# Patient Record
Sex: FEMALE | Race: BLACK OR AFRICAN AMERICAN | Employment: OTHER | URBAN - METROPOLITAN AREA
[De-identification: names, ages, dates, MRNs, and addresses within clinical notes are randomized per-mention and may not be internally consistent; named-entity substitution may affect disease eponyms.]

---

## 2017-03-18 ENCOUNTER — HOSPITAL ENCOUNTER (OUTPATIENT)
Dept: WOMENS IMAGING | Age: 72
Discharge: HOME | End: 2017-03-18
Payer: MEDICARE

## 2017-03-18 DIAGNOSIS — Z13.9 SCREENING: ICD-10-CM

## 2017-03-18 PROCEDURE — G0202 SCR MAMMO BI INCL CAD: HCPCS

## 2019-03-28 ENCOUNTER — HOSPITAL ENCOUNTER (OUTPATIENT)
Dept: WOMENS IMAGING | Age: 74
Discharge: HOME | End: 2019-03-30
Payer: MEDICARE

## 2019-03-28 DIAGNOSIS — Z12.31 ENCOUNTER FOR SCREENING MAMMOGRAM FOR BREAST CANCER: ICD-10-CM

## 2019-03-28 PROCEDURE — 77063 BREAST TOMOSYNTHESIS BI: CPT

## 2019-09-26 PROBLEM — D63.1 ERYTHROPOIETIN DEFICIENCY ANEMIA: Status: ACTIVE | Noted: 2019-09-26

## 2019-09-27 ENCOUNTER — HOSPITAL ENCOUNTER (OUTPATIENT)
Dept: INFUSION THERAPY | Age: 74
Setting detail: INFUSION SERIES
Discharge: HOME | End: 2019-09-27
Payer: MEDICARE

## 2019-09-27 DIAGNOSIS — D63.1 ERYTHROPOIETIN DEFICIENCY ANEMIA: Primary | ICD-10-CM

## 2019-09-27 NOTE — PROGRESS NOTES
Pt never showed up for appt. Called pt. Pt  Stated was almost home. Infusion would be a two hour procedure, so cannot do today. Pt Had gone to [facility] for appt. Stated was confused about appt. Pt will keep next appt for next week and the week after. Will need to add week. Johnny Moody, stated someone from [facility] called earlier and asked if pt can still come for infusion, and Tony Mercado said yes. Pt was checked in at that time and med was ordered. Evidently the person did not say which facility she was calling from.

## 2019-10-11 ENCOUNTER — HOSPITAL ENCOUNTER (OUTPATIENT)
Dept: INFUSION THERAPY | Age: 74
Setting detail: INFUSION SERIES
Discharge: HOME | End: 2019-10-11
Payer: MEDICARE

## 2019-10-11 VITALS — RESPIRATION RATE: 18 BRPM | SYSTOLIC BLOOD PRESSURE: 163 MMHG | DIASTOLIC BLOOD PRESSURE: 67 MMHG | TEMPERATURE: 97.2 F

## 2019-10-11 DIAGNOSIS — D63.1 ERYTHROPOIETIN DEFICIENCY ANEMIA: Primary | ICD-10-CM

## 2019-10-11 PROCEDURE — 2580000003 HC RX 258: Performed by: INTERNAL MEDICINE

## 2019-10-11 PROCEDURE — 6360000002 HC RX W HCPCS: Performed by: INTERNAL MEDICINE

## 2019-10-11 PROCEDURE — 96365 THER/PROPH/DIAG IV INF INIT: CPT

## 2019-10-11 RX ADMIN — IRON SUCROSE 200 MG: 20 INJECTION, SOLUTION INTRAVENOUS at 14:36

## 2019-10-11 NOTE — PROGRESS NOTES
No sxs reactions. Iv d/c'd and pt left unit. No c/o or requests. Will need to be scheduled for 10/25 to make up for the missed dose last week.

## 2019-10-11 NOTE — PROGRESS NOTES
When pt arrived, was made aware of one hour observation after infusion. lexicomp info given. Pt tolerated infusion. No sxs reactions. One hour begins.

## 2019-10-18 ENCOUNTER — HOSPITAL ENCOUNTER (OUTPATIENT)
Dept: INFUSION THERAPY | Age: 74
Setting detail: INFUSION SERIES
Discharge: HOME | End: 2019-10-18
Payer: MEDICARE

## 2019-10-18 VITALS
TEMPERATURE: 98.5 F | DIASTOLIC BLOOD PRESSURE: 63 MMHG | SYSTOLIC BLOOD PRESSURE: 141 MMHG | HEART RATE: 63 BPM | RESPIRATION RATE: 18 BRPM

## 2019-10-18 DIAGNOSIS — D63.1 ERYTHROPOIETIN DEFICIENCY ANEMIA: Primary | ICD-10-CM

## 2019-10-18 PROCEDURE — 2580000003 HC RX 258: Performed by: INTERNAL MEDICINE

## 2019-10-18 PROCEDURE — 96365 THER/PROPH/DIAG IV INF INIT: CPT

## 2019-10-18 PROCEDURE — 6360000002 HC RX W HCPCS: Performed by: INTERNAL MEDICINE

## 2019-10-18 RX ADMIN — IRON SUCROSE 200 MG: 20 INJECTION, SOLUTION INTRAVENOUS at 13:33

## 2019-10-25 ENCOUNTER — HOSPITAL ENCOUNTER (OUTPATIENT)
Dept: INFUSION THERAPY | Age: 74
Setting detail: INFUSION SERIES
Discharge: HOME | End: 2019-10-25
Payer: MEDICARE

## 2019-10-25 VITALS
RESPIRATION RATE: 18 BRPM | TEMPERATURE: 98 F | HEART RATE: 66 BPM | DIASTOLIC BLOOD PRESSURE: 67 MMHG | SYSTOLIC BLOOD PRESSURE: 160 MMHG

## 2019-10-25 DIAGNOSIS — D63.1 ERYTHROPOIETIN DEFICIENCY ANEMIA: Primary | ICD-10-CM

## 2019-10-25 PROCEDURE — 2580000003 HC RX 258: Performed by: INTERNAL MEDICINE

## 2019-10-25 PROCEDURE — 96365 THER/PROPH/DIAG IV INF INIT: CPT

## 2019-10-25 PROCEDURE — 6360000002 HC RX W HCPCS: Performed by: INTERNAL MEDICINE

## 2019-10-25 RX ADMIN — IRON SUCROSE 200 MG: 20 INJECTION, SOLUTION INTRAVENOUS at 11:10

## 2019-10-25 NOTE — PROGRESS NOTES
AVS given, also lexicomp on med since couldn't find last copy. Pt left unit ambulatory, no c/o or requests. All equipment used in the care for this patient has been cleaned.

## 2019-12-18 PROBLEM — N18.30 CHRONIC KIDNEY DISEASE, STAGE III (MODERATE) (HCC): Status: ACTIVE | Noted: 2019-12-18

## 2019-12-19 ENCOUNTER — HOSPITAL ENCOUNTER (OUTPATIENT)
Dept: INFUSION THERAPY | Age: 74
Setting detail: INFUSION SERIES
Discharge: HOME | End: 2019-12-19
Payer: MEDICARE

## 2019-12-19 ENCOUNTER — HOSPITAL ENCOUNTER (EMERGENCY)
Age: 74
Discharge: HOME | End: 2019-12-19
Payer: MEDICARE

## 2019-12-19 VITALS
RESPIRATION RATE: 18 BRPM | TEMPERATURE: 98.4 F | BODY MASS INDEX: 22.32 KG/M2 | DIASTOLIC BLOOD PRESSURE: 81 MMHG | SYSTOLIC BLOOD PRESSURE: 147 MMHG | HEIGHT: 63 IN | WEIGHT: 126 LBS | OXYGEN SATURATION: 100 % | HEART RATE: 69 BPM

## 2019-12-19 VITALS
BODY MASS INDEX: 22.32 KG/M2 | TEMPERATURE: 98.1 F | WEIGHT: 126 LBS | SYSTOLIC BLOOD PRESSURE: 206 MMHG | HEART RATE: 66 BPM | RESPIRATION RATE: 18 BRPM | DIASTOLIC BLOOD PRESSURE: 80 MMHG

## 2019-12-19 DIAGNOSIS — I10 ESSENTIAL HYPERTENSION: Primary | ICD-10-CM

## 2019-12-19 DIAGNOSIS — N18.30 CHRONIC KIDNEY DISEASE, STAGE III (MODERATE) (HCC): Primary | ICD-10-CM

## 2019-12-19 DIAGNOSIS — D63.1 ERYTHROPOIETIN DEFICIENCY ANEMIA: ICD-10-CM

## 2019-12-19 LAB
ALBUMIN SERPL-MCNC: 3.4 G/DL (ref 3.5–4.6)
ALP BLD-CCNC: 159 U/L (ref 40–130)
ALT SERPL-CCNC: 16 U/L (ref 0–33)
ANION GAP SERPL CALCULATED.3IONS-SCNC: 11 MEQ/L (ref 9–15)
ANISOCYTOSIS: ABNORMAL
AST SERPL-CCNC: 22 U/L (ref 0–35)
ATYPICAL LYMPHOCYTE RELATIVE PERCENT: 1 %
BASOPHILS ABSOLUTE: 0.1 K/UL (ref 0–0.2)
BASOPHILS RELATIVE PERCENT: 1 %
BILIRUB SERPL-MCNC: <0.2 MG/DL (ref 0.2–0.7)
BUN BLDV-MCNC: 24 MG/DL (ref 8–23)
CALCIUM SERPL-MCNC: 9.5 MG/DL (ref 8.5–9.9)
CHLORIDE BLD-SCNC: 98 MEQ/L (ref 95–107)
CO2: 25 MEQ/L (ref 20–31)
CREAT SERPL-MCNC: 1.46 MG/DL (ref 0.5–0.9)
EKG ATRIAL RATE: 66 BPM
EKG P AXIS: 64 DEGREES
EKG P-R INTERVAL: 152 MS
EKG Q-T INTERVAL: 430 MS
EKG QRS DURATION: 126 MS
EKG QTC CALCULATION (BAZETT): 450 MS
EKG R AXIS: -46 DEGREES
EKG T AXIS: 39 DEGREES
EKG VENTRICULAR RATE: 66 BPM
EOSINOPHILS ABSOLUTE: 0.4 K/UL (ref 0–0.7)
EOSINOPHILS RELATIVE PERCENT: 4 %
GFR AFRICAN AMERICAN: 42.6
GFR NON-AFRICAN AMERICAN: 35.2
GLOBULIN: 4.5 G/DL (ref 2.3–3.5)
GLUCOSE BLD-MCNC: 155 MG/DL (ref 70–99)
HCT VFR BLD CALC: 28.9 % (ref 37–47)
HCT VFR BLD CALC: 29.9 % (ref 37–47)
HEMOGLOBIN: 9.4 G/DL (ref 12–16)
HEMOGLOBIN: 9.4 G/DL (ref 12–16)
HYPOCHROMIA: ABNORMAL
LYMPHOCYTES ABSOLUTE: 3.6 K/UL (ref 1–4.8)
LYMPHOCYTES RELATIVE PERCENT: 40 %
MCH RBC QN AUTO: 21.7 PG (ref 27–31.3)
MCH RBC QN AUTO: 22 PG (ref 27–31.3)
MCHC RBC AUTO-ENTMCNC: 31.4 % (ref 33–37)
MCHC RBC AUTO-ENTMCNC: 32.4 % (ref 33–37)
MCV RBC AUTO: 67.8 FL (ref 82–100)
MCV RBC AUTO: 69 FL (ref 82–100)
MICROCYTES: ABNORMAL
MONOCYTES ABSOLUTE: 0.2 K/UL (ref 0.2–0.8)
MONOCYTES RELATIVE PERCENT: 1.9 %
NEUTROPHILS ABSOLUTE: 4.7 K/UL (ref 1.4–6.5)
NEUTROPHILS RELATIVE PERCENT: 53 %
PDW BLD-RTO: 17.6 % (ref 11.5–14.5)
PDW BLD-RTO: 17.9 % (ref 11.5–14.5)
PLATELET # BLD: 181 K/UL (ref 130–400)
PLATELET # BLD: 199 K/UL (ref 130–400)
POTASSIUM SERPL-SCNC: 3.8 MEQ/L (ref 3.4–4.9)
RBC # BLD: 4.26 M/UL (ref 4.2–5.4)
RBC # BLD: 4.33 M/UL (ref 4.2–5.4)
SODIUM BLD-SCNC: 134 MEQ/L (ref 135–144)
TARGET CELLS: ABNORMAL
TOTAL PROTEIN: 7.9 G/DL (ref 6.3–8)
WBC # BLD: 8.2 K/UL (ref 4.8–10.8)
WBC # BLD: 8.8 K/UL (ref 4.8–10.8)

## 2019-12-19 PROCEDURE — 96372 THER/PROPH/DIAG INJ SC/IM: CPT

## 2019-12-19 PROCEDURE — 85025 COMPLETE CBC W/AUTO DIFF WBC: CPT

## 2019-12-19 PROCEDURE — 36415 COLL VENOUS BLD VENIPUNCTURE: CPT

## 2019-12-19 PROCEDURE — 6360000002 HC RX W HCPCS: Performed by: PERSONAL EMERGENCY RESPONSE ATTENDANT

## 2019-12-19 PROCEDURE — 93005 ELECTROCARDIOGRAM TRACING: CPT | Performed by: EMERGENCY MEDICINE

## 2019-12-19 PROCEDURE — 6360000002 HC RX W HCPCS: Performed by: INTERNAL MEDICINE

## 2019-12-19 PROCEDURE — 80053 COMPREHEN METABOLIC PANEL: CPT

## 2019-12-19 PROCEDURE — 99283 EMERGENCY DEPT VISIT LOW MDM: CPT

## 2019-12-19 PROCEDURE — 85027 COMPLETE CBC AUTOMATED: CPT

## 2019-12-19 PROCEDURE — 96376 TX/PRO/DX INJ SAME DRUG ADON: CPT

## 2019-12-19 PROCEDURE — 96374 THER/PROPH/DIAG INJ IV PUSH: CPT

## 2019-12-19 RX ORDER — HYDRALAZINE HYDROCHLORIDE 20 MG/ML
10 INJECTION INTRAMUSCULAR; INTRAVENOUS ONCE
Status: COMPLETED | OUTPATIENT
Start: 2019-12-19 | End: 2019-12-19

## 2019-12-19 RX ADMIN — HYDRALAZINE HYDROCHLORIDE 10 MG: 20 INJECTION INTRAMUSCULAR; INTRAVENOUS at 19:31

## 2019-12-19 RX ADMIN — HYDRALAZINE HYDROCHLORIDE 10 MG: 20 INJECTION INTRAMUSCULAR; INTRAVENOUS at 18:57

## 2019-12-19 RX ADMIN — EPOETIN ALFA-EPBX 10000 UNITS: 10000 INJECTION, SOLUTION INTRAVENOUS; SUBCUTANEOUS at 13:23

## 2019-12-19 ASSESSMENT — ENCOUNTER SYMPTOMS
RHINORRHEA: 0
SORE THROAT: 0
NAUSEA: 0
SHORTNESS OF BREATH: 0
COUGH: 0
VOMITING: 0
BLOOD IN STOOL: 0
DIARRHEA: 0
COLOR CHANGE: 0
ABDOMINAL PAIN: 0

## 2019-12-19 NOTE — PROGRESS NOTES
Pt to [facility] ambulatory, with spouse, for lab and possible retacrit injection. Pt states no c/o. Spouse requests info on med.

## 2019-12-19 NOTE — PROGRESS NOTES
Lexicomp info was given. Injection admin after results of lab, Hgb 9.4 . Number increased from 8.6 in October. Pt tolerated well.

## 2019-12-19 NOTE — PROGRESS NOTES
Rechecked BP d/t high,   Now 222/96. Pt is anxious about injection. Will recheck BP in 10 minutes.   pt states didn't take BOP med this am.

## 2019-12-20 PROCEDURE — 93010 ELECTROCARDIOGRAM REPORT: CPT | Performed by: INTERNAL MEDICINE

## 2020-01-02 ENCOUNTER — HOSPITAL ENCOUNTER (OUTPATIENT)
Dept: INFUSION THERAPY | Age: 75
Setting detail: INFUSION SERIES
Discharge: HOME | End: 2020-01-02
Payer: MEDICARE

## 2020-01-02 VITALS
DIASTOLIC BLOOD PRESSURE: 77 MMHG | RESPIRATION RATE: 18 BRPM | SYSTOLIC BLOOD PRESSURE: 169 MMHG | HEART RATE: 76 BPM | TEMPERATURE: 98.2 F

## 2020-01-02 DIAGNOSIS — N18.30 CHRONIC KIDNEY DISEASE, STAGE III (MODERATE) (HCC): Primary | ICD-10-CM

## 2020-01-02 DIAGNOSIS — D63.1 ERYTHROPOIETIN DEFICIENCY ANEMIA: ICD-10-CM

## 2020-01-02 LAB
HCT VFR BLD CALC: 29.2 % (ref 37–47)
HEMOGLOBIN: 9.1 G/DL (ref 12–16)
MCH RBC QN AUTO: 21.7 PG (ref 27–31.3)
MCHC RBC AUTO-ENTMCNC: 31.2 % (ref 33–37)
MCV RBC AUTO: 69.5 FL (ref 82–100)
PDW BLD-RTO: 17.4 % (ref 11.5–14.5)
PLATELET # BLD: 186 K/UL (ref 130–400)
RBC # BLD: 4.2 M/UL (ref 4.2–5.4)
WBC # BLD: 6.6 K/UL (ref 4.8–10.8)

## 2020-01-02 PROCEDURE — 96372 THER/PROPH/DIAG INJ SC/IM: CPT

## 2020-01-02 PROCEDURE — 85027 COMPLETE CBC AUTOMATED: CPT

## 2020-01-02 PROCEDURE — 36415 COLL VENOUS BLD VENIPUNCTURE: CPT

## 2020-01-02 PROCEDURE — 6360000002 HC RX W HCPCS: Performed by: INTERNAL MEDICINE

## 2020-01-02 RX ADMIN — EPOETIN ALFA-EPBX 10000 UNITS: 10000 INJECTION, SOLUTION INTRAVENOUS; SUBCUTANEOUS at 13:17

## 2020-01-02 NOTE — PROGRESS NOTES
Injection administered. Pt tolerated well. No c/o or requests. Card given to call and make next 4 appts.

## 2020-01-16 ENCOUNTER — HOSPITAL ENCOUNTER (OUTPATIENT)
Dept: INFUSION THERAPY | Age: 75
Setting detail: INFUSION SERIES
Discharge: HOME | End: 2020-01-16
Payer: MEDICARE

## 2020-01-16 VITALS
TEMPERATURE: 98.3 F | RESPIRATION RATE: 18 BRPM | SYSTOLIC BLOOD PRESSURE: 128 MMHG | DIASTOLIC BLOOD PRESSURE: 59 MMHG | HEART RATE: 79 BPM

## 2020-01-16 DIAGNOSIS — D63.1 ERYTHROPOIETIN DEFICIENCY ANEMIA: ICD-10-CM

## 2020-01-16 DIAGNOSIS — N18.30 CHRONIC KIDNEY DISEASE, STAGE III (MODERATE) (HCC): Primary | ICD-10-CM

## 2020-01-16 PROCEDURE — 6360000002 HC RX W HCPCS: Performed by: INTERNAL MEDICINE

## 2020-01-16 PROCEDURE — 96372 THER/PROPH/DIAG INJ SC/IM: CPT

## 2020-01-16 RX ADMIN — EPOETIN ALFA-EPBX 10000 UNITS: 10000 INJECTION, SOLUTION INTRAVENOUS; SUBCUTANEOUS at 12:20

## 2020-01-16 NOTE — PROGRESS NOTES
1220. Pt given Retacrit 10,000 units subcu left upper outer arm. Emma well. HG 9.1 01/02/2020. Went over pt's upcoming appts. No requests or questions. All equipment used in the care for this patient has been cleaned.

## 2020-02-13 ENCOUNTER — HOSPITAL ENCOUNTER (OUTPATIENT)
Dept: INFUSION THERAPY | Age: 75
Setting detail: INFUSION SERIES
Discharge: HOME | End: 2020-02-13
Payer: MEDICARE

## 2020-02-13 VITALS
RESPIRATION RATE: 18 BRPM | DIASTOLIC BLOOD PRESSURE: 72 MMHG | HEART RATE: 73 BPM | SYSTOLIC BLOOD PRESSURE: 163 MMHG | TEMPERATURE: 97.8 F

## 2020-02-13 DIAGNOSIS — N18.30 CHRONIC KIDNEY DISEASE, STAGE III (MODERATE) (HCC): Primary | ICD-10-CM

## 2020-02-13 DIAGNOSIS — D63.1 ERYTHROPOIETIN DEFICIENCY ANEMIA: ICD-10-CM

## 2020-02-13 LAB
HCT VFR BLD CALC: 32.8 % (ref 37–47)
HEMOGLOBIN: 10.4 G/DL (ref 12–16)
MCH RBC QN AUTO: 21.8 PG (ref 27–31.3)
MCHC RBC AUTO-ENTMCNC: 31.7 % (ref 33–37)
MCV RBC AUTO: 68.7 FL (ref 82–100)
PDW BLD-RTO: 16.8 % (ref 11.5–14.5)
PLATELET # BLD: 225 K/UL (ref 130–400)
RBC # BLD: 4.77 M/UL (ref 4.2–5.4)
WBC # BLD: 7.9 K/UL (ref 4.8–10.8)

## 2020-02-13 PROCEDURE — 36415 COLL VENOUS BLD VENIPUNCTURE: CPT

## 2020-02-13 PROCEDURE — 85027 COMPLETE CBC AUTOMATED: CPT

## 2020-02-13 NOTE — PROGRESS NOTES
Lab showed Hgb 10.4. No injection needed. Instructed pt to make appt for one month to return. avs given.

## 2020-03-13 ENCOUNTER — HOSPITAL ENCOUNTER (OUTPATIENT)
Dept: INFUSION THERAPY | Age: 75
Setting detail: INFUSION SERIES
Discharge: HOME | End: 2020-03-13
Payer: MEDICARE

## 2020-03-13 VITALS
SYSTOLIC BLOOD PRESSURE: 197 MMHG | HEART RATE: 67 BPM | RESPIRATION RATE: 18 BRPM | DIASTOLIC BLOOD PRESSURE: 89 MMHG | TEMPERATURE: 97.8 F

## 2020-03-13 DIAGNOSIS — D63.1 ERYTHROPOIETIN DEFICIENCY ANEMIA: ICD-10-CM

## 2020-03-13 DIAGNOSIS — N18.30 CHRONIC KIDNEY DISEASE, STAGE III (MODERATE) (HCC): Primary | ICD-10-CM

## 2020-03-13 LAB
HCT VFR BLD CALC: 29.7 % (ref 37–47)
HEMOGLOBIN: 9.3 G/DL (ref 12–16)
MCH RBC QN AUTO: 21.4 PG (ref 27–31.3)
MCHC RBC AUTO-ENTMCNC: 31.4 % (ref 33–37)
MCV RBC AUTO: 68.1 FL (ref 82–100)
PDW BLD-RTO: 17.1 % (ref 11.5–14.5)
PLATELET # BLD: 173 K/UL (ref 130–400)
RBC # BLD: 4.36 M/UL (ref 4.2–5.4)
WBC # BLD: 6.6 K/UL (ref 4.8–10.8)

## 2020-03-13 PROCEDURE — 6360000002 HC RX W HCPCS: Performed by: INTERNAL MEDICINE

## 2020-03-13 PROCEDURE — 36415 COLL VENOUS BLD VENIPUNCTURE: CPT

## 2020-03-13 PROCEDURE — 96372 THER/PROPH/DIAG INJ SC/IM: CPT

## 2020-03-13 PROCEDURE — 85027 COMPLETE CBC AUTOMATED: CPT

## 2020-03-13 RX ADMIN — EPOETIN ALFA-EPBX 10000 UNITS: 10000 INJECTION, SOLUTION INTRAVENOUS; SUBCUTANEOUS at 09:24

## 2020-03-13 NOTE — PROGRESS NOTES
Pt to [facility] ambulatory, with spouse for lab draw and possible retacrit. Pt states no c/o. Lab drawn and sent.

## 2020-03-13 NOTE — PROGRESS NOTES
Hemoglobin 9.3. Retacrit admin. Pt tolerated well. No c/o or requests.   Left unit after receiving AVS.

## 2020-04-22 ENCOUNTER — HOSPITAL ENCOUNTER (OUTPATIENT)
Dept: INFUSION THERAPY | Age: 75
Setting detail: INFUSION SERIES
Discharge: HOME | End: 2020-04-22
Payer: MEDICARE

## 2020-04-22 VITALS
HEART RATE: 84 BPM | DIASTOLIC BLOOD PRESSURE: 81 MMHG | TEMPERATURE: 98.5 F | SYSTOLIC BLOOD PRESSURE: 184 MMHG | RESPIRATION RATE: 18 BRPM

## 2020-04-22 DIAGNOSIS — D63.1 ERYTHROPOIETIN DEFICIENCY ANEMIA: ICD-10-CM

## 2020-04-22 DIAGNOSIS — N18.30 CHRONIC KIDNEY DISEASE, STAGE III (MODERATE) (HCC): Primary | ICD-10-CM

## 2020-04-22 LAB
HCT VFR BLD CALC: 24.5 % (ref 37–47)
HEMOGLOBIN: 7.7 G/DL (ref 12–16)
MCH RBC QN AUTO: 21.3 PG (ref 27–31.3)
MCHC RBC AUTO-ENTMCNC: 31.2 % (ref 33–37)
MCV RBC AUTO: 68.2 FL (ref 82–100)
PDW BLD-RTO: 18.9 % (ref 11.5–14.5)
PLATELET # BLD: 142 K/UL (ref 130–400)
RBC # BLD: 3.59 M/UL (ref 4.2–5.4)
WBC # BLD: 4.7 K/UL (ref 4.8–10.8)

## 2020-04-22 PROCEDURE — 96372 THER/PROPH/DIAG INJ SC/IM: CPT

## 2020-04-22 PROCEDURE — 6360000002 HC RX W HCPCS: Performed by: INTERNAL MEDICINE

## 2020-04-22 PROCEDURE — 36415 COLL VENOUS BLD VENIPUNCTURE: CPT

## 2020-04-22 PROCEDURE — 85027 COMPLETE CBC AUTOMATED: CPT

## 2020-04-22 RX ADMIN — EPOETIN ALFA-EPBX 10000 UNITS: 10000 INJECTION, SOLUTION INTRAVENOUS; SUBCUTANEOUS at 15:59

## 2020-04-22 NOTE — FLOWSHEET NOTE
Patient to the floor ambulatory for labs and her injection. Vital signs taken. Blood pressure is elevated. She states that her DrLilian Increased her blood pressure medication. Labs obtained and sent. Call light within reach. Family at side.

## 2020-04-22 NOTE — FLOWSHEET NOTE
HGB 7.7. Injection given in left arm. Tolerated well. Lab results faxed to Dr. Michele Harp office. Declined an AVS. Left the unit ambulatory. All equipment used in the care for this patient has been cleaned.

## 2020-09-24 ENCOUNTER — HOSPITAL ENCOUNTER (OUTPATIENT)
Dept: INFUSION THERAPY | Age: 75
Setting detail: INFUSION SERIES
Discharge: HOME | End: 2020-09-24
Payer: MEDICARE

## 2020-09-24 VITALS
HEART RATE: 84 BPM | DIASTOLIC BLOOD PRESSURE: 69 MMHG | RESPIRATION RATE: 16 BRPM | SYSTOLIC BLOOD PRESSURE: 153 MMHG | TEMPERATURE: 98.4 F

## 2020-09-24 DIAGNOSIS — N18.30 CHRONIC KIDNEY DISEASE, STAGE III (MODERATE) (HCC): Primary | ICD-10-CM

## 2020-09-24 DIAGNOSIS — D63.1 ERYTHROPOIETIN DEFICIENCY ANEMIA: ICD-10-CM

## 2020-09-24 LAB
ANISOCYTOSIS: ABNORMAL
BANDED NEUTROPHILS RELATIVE PERCENT: 1 % (ref 5–11)
BASOPHILS ABSOLUTE: 0.1 K/UL (ref 0–0.2)
BASOPHILS RELATIVE PERCENT: 1 %
EOSINOPHILS ABSOLUTE: 0 K/UL (ref 0–0.7)
EOSINOPHILS RELATIVE PERCENT: 0.8 %
HCT VFR BLD CALC: 23 % (ref 37–47)
HEMOGLOBIN: 7.3 G/DL (ref 12–16)
HYPOCHROMIA: ABNORMAL
LYMPHOCYTES ABSOLUTE: 0.5 K/UL (ref 1–4.8)
LYMPHOCYTES RELATIVE PERCENT: 8 %
MACROCYTES: ABNORMAL
MCH RBC QN AUTO: 21.3 PG (ref 27–31.3)
MCHC RBC AUTO-ENTMCNC: 31.9 % (ref 33–37)
MCV RBC AUTO: 66.7 FL (ref 82–100)
MONOCYTES ABSOLUTE: 0.4 K/UL (ref 0.2–0.8)
MONOCYTES RELATIVE PERCENT: 6.9 %
NEUTROPHILS ABSOLUTE: 5.2 K/UL (ref 1.4–6.5)
NEUTROPHILS RELATIVE PERCENT: 83 %
PDW BLD-RTO: 20.2 % (ref 11.5–14.5)
PLATELET # BLD: 179 K/UL (ref 130–400)
PLATELET SLIDE REVIEW: NORMAL
RBC # BLD: 3.45 M/UL (ref 4.2–5.4)
SLIDE REVIEW: ABNORMAL
TARGET CELLS: ABNORMAL
WBC # BLD: 6.2 K/UL (ref 4.8–10.8)

## 2020-09-24 PROCEDURE — 96365 THER/PROPH/DIAG IV INF INIT: CPT

## 2020-09-24 PROCEDURE — 85025 COMPLETE CBC W/AUTO DIFF WBC: CPT

## 2020-09-24 PROCEDURE — 6360000002 HC RX W HCPCS: Performed by: INTERNAL MEDICINE

## 2020-09-24 PROCEDURE — 2580000003 HC RX 258: Performed by: INTERNAL MEDICINE

## 2020-09-24 PROCEDURE — 36415 COLL VENOUS BLD VENIPUNCTURE: CPT

## 2020-09-24 RX ADMIN — SODIUM CHLORIDE 200 MG: 9 INJECTION, SOLUTION INTRAVENOUS at 14:38

## 2020-09-24 NOTE — FLOWSHEET NOTE
Observation complete, patient tolerated well with no symptoms. Reminded of appointment for injection for next monday.  Left unit via wheelchair with spouse assistance, all equipment cleaned after discharge

## 2020-09-28 ENCOUNTER — HOSPITAL ENCOUNTER (OUTPATIENT)
Dept: INFUSION THERAPY | Age: 75
Setting detail: INFUSION SERIES
Discharge: HOME | End: 2020-09-28
Payer: MEDICARE

## 2020-09-28 VITALS
SYSTOLIC BLOOD PRESSURE: 175 MMHG | HEART RATE: 79 BPM | DIASTOLIC BLOOD PRESSURE: 81 MMHG | RESPIRATION RATE: 18 BRPM | TEMPERATURE: 98.4 F

## 2020-09-28 DIAGNOSIS — N18.30 CHRONIC KIDNEY DISEASE, STAGE III (MODERATE) (HCC): Primary | ICD-10-CM

## 2020-09-28 DIAGNOSIS — D63.1 ERYTHROPOIETIN DEFICIENCY ANEMIA: ICD-10-CM

## 2020-09-28 DIAGNOSIS — N18.30 CHRONIC KIDNEY DISEASE, STAGE III (MODERATE) (HCC): ICD-10-CM

## 2020-09-28 PROCEDURE — 6360000002 HC RX W HCPCS: Performed by: INTERNAL MEDICINE

## 2020-09-28 PROCEDURE — 96372 THER/PROPH/DIAG INJ SC/IM: CPT

## 2020-09-28 RX ORDER — AMLODIPINE BESYLATE 10 MG/1
10 TABLET ORAL DAILY
Status: ON HOLD | COMMUNITY
End: 2020-10-05

## 2020-09-28 RX ADMIN — EPOETIN ALFA-EPBX 10000 UNITS: 10000 INJECTION, SOLUTION INTRAVENOUS; SUBCUTANEOUS at 14:39

## 2020-09-28 NOTE — FLOWSHEET NOTE
Patient to the floor via wheelchair for her retacrit injection . Vital signs taken. Blood pressure is elevated. States that she did take her blood pressure medications prior to arriving. Denies any discomfort. Call light within reach. Family at side.

## 2020-09-28 NOTE — FLOWSHEET NOTE
Observation complete. Declined an AVS. Left the unit via wheelchair. All equipment used in the care for this patient has been cleaned.

## 2020-10-04 ENCOUNTER — HOSPITAL ENCOUNTER (INPATIENT)
Age: 75
LOS: 4 days | Discharge: HOME HEALTH | DRG: 435 | End: 2020-10-09
Attending: INTERNAL MEDICINE | Admitting: INTERNAL MEDICINE
Payer: MEDICARE

## 2020-10-04 ENCOUNTER — APPOINTMENT (OUTPATIENT)
Dept: CT IMAGING | Age: 75
DRG: 435 | End: 2020-10-04
Payer: MEDICARE

## 2020-10-04 LAB
ALBUMIN SERPL-MCNC: 2.2 G/DL (ref 3.5–4.6)
ALP BLD-CCNC: 430 U/L (ref 40–130)
ALT SERPL-CCNC: 30 U/L (ref 0–33)
ANION GAP SERPL CALCULATED.3IONS-SCNC: 10 MEQ/L (ref 9–15)
AST SERPL-CCNC: 91 U/L (ref 0–35)
BASE EXCESS VENOUS: -3 (ref -3–3)
BETA-HYDROXYBUTYRATE: 1.7 MG/DL (ref 0.2–2.8)
BILIRUB SERPL-MCNC: 0.7 MG/DL (ref 0.2–0.7)
BUN BLDV-MCNC: 37 MG/DL (ref 8–23)
CALCIUM IONIZED: 1.07 MMOL/L (ref 1.12–1.32)
CALCIUM SERPL-MCNC: 8.5 MG/DL (ref 8.5–9.9)
CHLORIDE BLD-SCNC: 98 MEQ/L (ref 95–107)
CHP ED QC CHECK: YES
CO2: 21 MEQ/L (ref 20–31)
CREAT SERPL-MCNC: 1.6 MG/DL (ref 0.5–0.9)
GFR AFRICAN AMERICAN: 36
GFR AFRICAN AMERICAN: 38.2
GFR NON-AFRICAN AMERICAN: 29
GFR NON-AFRICAN AMERICAN: 31.6
GLOBULIN: 4.9 G/DL (ref 2.3–3.5)
GLUCOSE BLD-MCNC: 254 MG/DL (ref 60–115)
GLUCOSE BLD-MCNC: 261 MG/DL (ref 70–99)
GLUCOSE BLD-MCNC: 270 MG/DL (ref 60–115)
GLUCOSE BLD-MCNC: 276 MG/DL
GLUCOSE BLD-MCNC: 276 MG/DL (ref 60–115)
HCO3 VENOUS: 21 MMOL/L (ref 23–29)
HEMOGLOBIN: 9 GM/DL (ref 12–16)
LACTATE: 1.93 MMOL/L (ref 0.4–2)
LACTIC ACID: 1.7 MMOL/L (ref 0.5–2.2)
LIPASE: 31 U/L (ref 12–95)
MAGNESIUM: 1.9 MG/DL (ref 1.7–2.4)
O2 SAT, VEN: 91 %
PCO2, VEN: 29.5 MM HG (ref 40–50)
PERFORMED ON: ABNORMAL
PH VENOUS: 7.46 (ref 7.35–7.45)
PO2, VEN: 57 MM HG
POC CHLORIDE: 103 MEQ/L (ref 99–110)
POC CREATININE: 1.7 MG/DL (ref 0.6–1.2)
POC HEMATOCRIT: 26 % (ref 36–48)
POC POTASSIUM: 4.3 MEQ/L (ref 3.5–5.1)
POC SAMPLE TYPE: ABNORMAL
POC SODIUM: 134 MEQ/L (ref 136–145)
POTASSIUM SERPL-SCNC: 4.6 MEQ/L (ref 3.4–4.9)
PRO-BNP: 4054 PG/ML
SODIUM BLD-SCNC: 129 MEQ/L (ref 135–144)
TCO2 CALC VENOUS: 22 MMOL/L
TOTAL PROTEIN: 7.1 G/DL (ref 6.3–8)

## 2020-10-04 PROCEDURE — 82565 ASSAY OF CREATININE: CPT

## 2020-10-04 PROCEDURE — 84295 ASSAY OF SERUM SODIUM: CPT

## 2020-10-04 PROCEDURE — 36415 COLL VENOUS BLD VENIPUNCTURE: CPT

## 2020-10-04 PROCEDURE — 82803 BLOOD GASES ANY COMBINATION: CPT

## 2020-10-04 PROCEDURE — 81001 URINALYSIS AUTO W/SCOPE: CPT

## 2020-10-04 PROCEDURE — 85014 HEMATOCRIT: CPT

## 2020-10-04 PROCEDURE — 83880 ASSAY OF NATRIURETIC PEPTIDE: CPT

## 2020-10-04 PROCEDURE — 84132 ASSAY OF SERUM POTASSIUM: CPT

## 2020-10-04 PROCEDURE — 51701 INSERT BLADDER CATHETER: CPT

## 2020-10-04 PROCEDURE — 83690 ASSAY OF LIPASE: CPT

## 2020-10-04 PROCEDURE — 82010 KETONE BODYS QUAN: CPT

## 2020-10-04 PROCEDURE — 36600 WITHDRAWAL OF ARTERIAL BLOOD: CPT

## 2020-10-04 PROCEDURE — 82330 ASSAY OF CALCIUM: CPT

## 2020-10-04 PROCEDURE — 83735 ASSAY OF MAGNESIUM: CPT

## 2020-10-04 PROCEDURE — 82435 ASSAY OF BLOOD CHLORIDE: CPT

## 2020-10-04 PROCEDURE — 85025 COMPLETE CBC W/AUTO DIFF WBC: CPT

## 2020-10-04 PROCEDURE — 99285 EMERGENCY DEPT VISIT HI MDM: CPT

## 2020-10-04 PROCEDURE — 82948 REAGENT STRIP/BLOOD GLUCOSE: CPT

## 2020-10-04 PROCEDURE — 74176 CT ABD & PELVIS W/O CONTRAST: CPT

## 2020-10-04 PROCEDURE — 83605 ASSAY OF LACTIC ACID: CPT

## 2020-10-04 PROCEDURE — 51702 INSERT TEMP BLADDER CATH: CPT

## 2020-10-04 PROCEDURE — 2580000003 HC RX 258: Performed by: PHYSICIAN ASSISTANT

## 2020-10-04 PROCEDURE — 80053 COMPREHEN METABOLIC PANEL: CPT

## 2020-10-04 RX ORDER — 0.9 % SODIUM CHLORIDE 0.9 %
1000 INTRAVENOUS SOLUTION INTRAVENOUS ONCE
Status: COMPLETED | OUTPATIENT
Start: 2020-10-04 | End: 2020-10-05

## 2020-10-04 RX ADMIN — SODIUM CHLORIDE 1000 ML: 9 INJECTION, SOLUTION INTRAVENOUS at 22:58

## 2020-10-04 ASSESSMENT — ENCOUNTER SYMPTOMS
APNEA: 0
DIARRHEA: 0
TROUBLE SWALLOWING: 0
ALLERGIC/IMMUNOLOGIC NEGATIVE: 1
EYE PAIN: 0
ABDOMINAL PAIN: 1
SHORTNESS OF BREATH: 0
COLOR CHANGE: 0
VOMITING: 0

## 2020-10-05 ENCOUNTER — APPOINTMENT (OUTPATIENT)
Dept: ULTRASOUND IMAGING | Age: 75
DRG: 435 | End: 2020-10-05
Payer: MEDICARE

## 2020-10-05 ENCOUNTER — APPOINTMENT (OUTPATIENT)
Dept: CT IMAGING | Age: 75
DRG: 435 | End: 2020-10-05
Payer: MEDICARE

## 2020-10-05 ENCOUNTER — HOSPITAL ENCOUNTER (OUTPATIENT)
Dept: INFUSION THERAPY | Age: 75
Setting detail: INFUSION SERIES
Discharge: HOME | End: 2020-10-05
Payer: MEDICARE

## 2020-10-05 ENCOUNTER — APPOINTMENT (OUTPATIENT)
Dept: GENERAL RADIOLOGY | Age: 75
DRG: 435 | End: 2020-10-05
Payer: MEDICARE

## 2020-10-05 PROBLEM — R53.1 WEAKNESS: Status: ACTIVE | Noted: 2020-10-05

## 2020-10-05 LAB
ALBUMIN SERPL-MCNC: 2 G/DL (ref 3.5–4.6)
ALP BLD-CCNC: 405 U/L (ref 40–130)
ALT SERPL-CCNC: 29 U/L (ref 0–33)
AMMONIA: 65 UMOL/L (ref 11–51)
ANION GAP SERPL CALCULATED.3IONS-SCNC: 12 MEQ/L (ref 9–15)
ANISOCYTOSIS: ABNORMAL
AST SERPL-CCNC: 73 U/L (ref 0–35)
BACTERIA: NEGATIVE /HPF
BASOPHILS ABSOLUTE: 0.1 K/UL (ref 0–0.2)
BASOPHILS RELATIVE PERCENT: 1 %
BILIRUB SERPL-MCNC: 0.7 MG/DL (ref 0.2–0.7)
BILIRUBIN DIRECT: 0.5 MG/DL (ref 0–0.4)
BILIRUBIN URINE: NEGATIVE
BILIRUBIN, INDIRECT: 0.2 MG/DL (ref 0–0.6)
BLOOD, URINE: ABNORMAL
BUN BLDV-MCNC: 33 MG/DL (ref 8–23)
CALCIUM SERPL-MCNC: 8.3 MG/DL (ref 8.5–9.9)
CHLORIDE BLD-SCNC: 101 MEQ/L (ref 95–107)
CLARITY: CLEAR
CO2: 19 MEQ/L (ref 20–31)
COLOR: YELLOW
CREAT SERPL-MCNC: 1.47 MG/DL (ref 0.5–0.9)
CREATININE URINE: 34.8 MG/DL
EKG ATRIAL RATE: 85 BPM
EKG P AXIS: 63 DEGREES
EKG P-R INTERVAL: 162 MS
EKG Q-T INTERVAL: 420 MS
EKG QRS DURATION: 132 MS
EKG QTC CALCULATION (BAZETT): 499 MS
EKG R AXIS: -49 DEGREES
EKG T AXIS: 15 DEGREES
EKG VENTRICULAR RATE: 85 BPM
EOSINOPHILS ABSOLUTE: 0 K/UL (ref 0–0.7)
EOSINOPHILS RELATIVE PERCENT: 0.7 %
EPITHELIAL CELLS, UA: ABNORMAL /HPF (ref 0–5)
GFR AFRICAN AMERICAN: 42.1
GFR NON-AFRICAN AMERICAN: 34.8
GLUCOSE BLD-MCNC: 203 MG/DL (ref 70–99)
GLUCOSE BLD-MCNC: 228 MG/DL (ref 60–115)
GLUCOSE BLD-MCNC: 298 MG/DL (ref 60–115)
GLUCOSE BLD-MCNC: 319 MG/DL (ref 60–115)
GLUCOSE BLD-MCNC: 399 MG/DL (ref 60–115)
GLUCOSE BLD-MCNC: 402 MG/DL (ref 60–115)
GLUCOSE URINE: NEGATIVE MG/DL
HBA1C MFR BLD: 10.5 % (ref 4.8–5.9)
HCT VFR BLD CALC: 24.5 % (ref 37–47)
HEMOGLOBIN: 8 G/DL (ref 12–16)
HYALINE CASTS: ABNORMAL /HPF (ref 0–5)
HYPOCHROMIA: ABNORMAL
KETONES, URINE: NEGATIVE MG/DL
LACTIC ACID: 1.6 MMOL/L (ref 0.5–2.2)
LEUKOCYTE ESTERASE, URINE: NEGATIVE
LYMPHOCYTES ABSOLUTE: 1.1 K/UL (ref 1–4.8)
LYMPHOCYTES RELATIVE PERCENT: 16 %
MACROCYTES: ABNORMAL
MCH RBC QN AUTO: 21.8 PG (ref 27–31.3)
MCHC RBC AUTO-ENTMCNC: 32.5 % (ref 33–37)
MCV RBC AUTO: 67.1 FL (ref 82–100)
MICROCYTES: ABNORMAL
MONOCYTES ABSOLUTE: 0.4 K/UL (ref 0.2–0.8)
MONOCYTES RELATIVE PERCENT: 5.8 %
NEUTROPHILS ABSOLUTE: 5.4 K/UL (ref 1.4–6.5)
NEUTROPHILS RELATIVE PERCENT: 77 %
NITRITE, URINE: NEGATIVE
PARATHYROID HORMONE INTACT: 41.8 PG/ML (ref 15–65)
PDW BLD-RTO: 20.7 % (ref 11.5–14.5)
PERFORMED ON: ABNORMAL
PH UA: 6 (ref 5–9)
PHOSPHORUS: 3.1 MG/DL (ref 2.3–4.8)
PLATELET # BLD: 187 K/UL (ref 130–400)
PLATELET SLIDE REVIEW: NORMAL
POIKILOCYTES: ABNORMAL
POLYCHROMASIA: ABNORMAL
POTASSIUM REFLEX MAGNESIUM: 3.9 MEQ/L (ref 3.4–4.9)
PROCALCITONIN: 2.93 NG/ML (ref 0–0.15)
PROTEIN UA: >=300 MG/DL
RBC # BLD: 3.65 M/UL (ref 4.2–5.4)
RBC UA: ABNORMAL /HPF (ref 0–5)
RENAL EPITHELIAL, UA: ABNORMAL /HPF
SARS-COV-2, NAAT: NOT DETECTED
SMUDGE CELLS: 1.9
SODIUM BLD-SCNC: 132 MEQ/L (ref 135–144)
SODIUM URINE: 87 MEQ/L
SPECIFIC GRAVITY UA: 1.01 (ref 1–1.03)
TARGET CELLS: ABNORMAL
TOTAL PROTEIN: 6.8 G/DL (ref 6.3–8)
UREA NITROGEN, UR: 415 MG/DL
URINE REFLEX TO CULTURE: ABNORMAL
UROBILINOGEN, URINE: 1 E.U./DL
WBC # BLD: 7 K/UL (ref 4.8–10.8)
WBC UA: ABNORMAL /HPF (ref 0–5)

## 2020-10-05 PROCEDURE — 84300 ASSAY OF URINE SODIUM: CPT

## 2020-10-05 PROCEDURE — 6370000000 HC RX 637 (ALT 250 FOR IP): Performed by: INTERNAL MEDICINE

## 2020-10-05 PROCEDURE — 83605 ASSAY OF LACTIC ACID: CPT

## 2020-10-05 PROCEDURE — 2700000000 HC OXYGEN THERAPY PER DAY

## 2020-10-05 PROCEDURE — 2500000003 HC RX 250 WO HCPCS: Performed by: PHYSICIAN ASSISTANT

## 2020-10-05 PROCEDURE — 82570 ASSAY OF URINE CREATININE: CPT

## 2020-10-05 PROCEDURE — 93005 ELECTROCARDIOGRAM TRACING: CPT | Performed by: INTERNAL MEDICINE

## 2020-10-05 PROCEDURE — 99222 1ST HOSP IP/OBS MODERATE 55: CPT | Performed by: SPECIALIST

## 2020-10-05 PROCEDURE — 80076 HEPATIC FUNCTION PANEL: CPT

## 2020-10-05 PROCEDURE — 71045 X-RAY EXAM CHEST 1 VIEW: CPT

## 2020-10-05 PROCEDURE — 93010 ELECTROCARDIOGRAM REPORT: CPT | Performed by: INTERNAL MEDICINE

## 2020-10-05 PROCEDURE — 80048 BASIC METABOLIC PNL TOTAL CA: CPT

## 2020-10-05 PROCEDURE — 6360000002 HC RX W HCPCS: Performed by: PHYSICIAN ASSISTANT

## 2020-10-05 PROCEDURE — 93975 VASCULAR STUDY: CPT

## 2020-10-05 PROCEDURE — 73502 X-RAY EXAM HIP UNI 2-3 VIEWS: CPT

## 2020-10-05 PROCEDURE — 83036 HEMOGLOBIN GLYCOSYLATED A1C: CPT

## 2020-10-05 PROCEDURE — 94761 N-INVAS EAR/PLS OXIMETRY MLT: CPT

## 2020-10-05 PROCEDURE — 6370000000 HC RX 637 (ALT 250 FOR IP): Performed by: PHYSICIAN ASSISTANT

## 2020-10-05 PROCEDURE — 36415 COLL VENOUS BLD VENIPUNCTURE: CPT

## 2020-10-05 PROCEDURE — 83970 ASSAY OF PARATHORMONE: CPT

## 2020-10-05 PROCEDURE — 82140 ASSAY OF AMMONIA: CPT

## 2020-10-05 PROCEDURE — 2580000003 HC RX 258: Performed by: PHYSICIAN ASSISTANT

## 2020-10-05 PROCEDURE — U0002 COVID-19 LAB TEST NON-CDC: HCPCS

## 2020-10-05 PROCEDURE — 51702 INSERT TEMP BLADDER CATH: CPT

## 2020-10-05 PROCEDURE — 94640 AIRWAY INHALATION TREATMENT: CPT

## 2020-10-05 PROCEDURE — 84540 ASSAY OF URINE/UREA-N: CPT

## 2020-10-05 PROCEDURE — 94664 DEMO&/EVAL PT USE INHALER: CPT

## 2020-10-05 PROCEDURE — 84100 ASSAY OF PHOSPHORUS: CPT

## 2020-10-05 PROCEDURE — 94667 MNPJ CHEST WALL 1ST: CPT

## 2020-10-05 PROCEDURE — 84145 PROCALCITONIN (PCT): CPT

## 2020-10-05 PROCEDURE — 70450 CT HEAD/BRAIN W/O DYE: CPT

## 2020-10-05 PROCEDURE — 99222 1ST HOSP IP/OBS MODERATE 55: CPT | Performed by: INTERNAL MEDICINE

## 2020-10-05 PROCEDURE — 6360000002 HC RX W HCPCS: Performed by: INTERNAL MEDICINE

## 2020-10-05 PROCEDURE — 87040 BLOOD CULTURE FOR BACTERIA: CPT

## 2020-10-05 PROCEDURE — 1210000000 HC MED SURG R&B

## 2020-10-05 RX ORDER — AMLODIPINE BESYLATE 10 MG/1
10 TABLET ORAL DAILY
COMMUNITY

## 2020-10-05 RX ORDER — ASPIRIN 81 MG/1
81 TABLET, CHEWABLE ORAL DAILY
Status: DISCONTINUED | OUTPATIENT
Start: 2020-10-05 | End: 2020-10-09 | Stop reason: HOSPADM

## 2020-10-05 RX ORDER — METHYLPREDNISOLONE SODIUM SUCCINATE 125 MG/2ML
125 INJECTION, POWDER, LYOPHILIZED, FOR SOLUTION INTRAMUSCULAR; INTRAVENOUS ONCE
Status: COMPLETED | OUTPATIENT
Start: 2020-10-05 | End: 2020-10-05

## 2020-10-05 RX ORDER — INSULIN GLARGINE 100 [IU]/ML
12 INJECTION, SOLUTION SUBCUTANEOUS NIGHTLY
Status: DISCONTINUED | OUTPATIENT
Start: 2020-10-05 | End: 2020-10-06

## 2020-10-05 RX ORDER — AMLODIPINE BESYLATE 10 MG/1
10 TABLET ORAL DAILY
Status: DISCONTINUED | OUTPATIENT
Start: 2020-10-05 | End: 2020-10-09 | Stop reason: HOSPADM

## 2020-10-05 RX ORDER — DEXTROSE MONOHYDRATE 25 G/50ML
12.5 INJECTION, SOLUTION INTRAVENOUS PRN
Status: DISCONTINUED | OUTPATIENT
Start: 2020-10-05 | End: 2020-10-09 | Stop reason: HOSPADM

## 2020-10-05 RX ORDER — ALBUTEROL SULFATE 90 UG/1
2 AEROSOL, METERED RESPIRATORY (INHALATION)
Status: DISCONTINUED | OUTPATIENT
Start: 2020-10-05 | End: 2020-10-08

## 2020-10-05 RX ORDER — DEXTROSE MONOHYDRATE 50 MG/ML
100 INJECTION, SOLUTION INTRAVENOUS PRN
Status: DISCONTINUED | OUTPATIENT
Start: 2020-10-05 | End: 2020-10-09 | Stop reason: HOSPADM

## 2020-10-05 RX ORDER — GUAIFENESIN 600 MG/1
600 TABLET, EXTENDED RELEASE ORAL 2 TIMES DAILY
Status: DISCONTINUED | OUTPATIENT
Start: 2020-10-05 | End: 2020-10-09 | Stop reason: HOSPADM

## 2020-10-05 RX ORDER — PROMETHAZINE HYDROCHLORIDE 12.5 MG/1
12.5 TABLET ORAL EVERY 6 HOURS PRN
Status: DISCONTINUED | OUTPATIENT
Start: 2020-10-05 | End: 2020-10-09 | Stop reason: HOSPADM

## 2020-10-05 RX ORDER — POLYETHYLENE GLYCOL 3350 17 G/17G
17 POWDER, FOR SOLUTION ORAL DAILY PRN
Status: DISCONTINUED | OUTPATIENT
Start: 2020-10-05 | End: 2020-10-09 | Stop reason: HOSPADM

## 2020-10-05 RX ORDER — NICOTINE POLACRILEX 4 MG
15 LOZENGE BUCCAL PRN
Status: DISCONTINUED | OUTPATIENT
Start: 2020-10-05 | End: 2020-10-09 | Stop reason: HOSPADM

## 2020-10-05 RX ORDER — LACTULOSE 10 G/15ML
20 SOLUTION ORAL 2 TIMES DAILY
Status: DISCONTINUED | OUTPATIENT
Start: 2020-10-05 | End: 2020-10-09 | Stop reason: HOSPADM

## 2020-10-05 RX ORDER — LABETALOL HYDROCHLORIDE 5 MG/ML
10 INJECTION, SOLUTION INTRAVENOUS ONCE
Status: COMPLETED | OUTPATIENT
Start: 2020-10-05 | End: 2020-10-05

## 2020-10-05 RX ORDER — IPRATROPIUM BROMIDE AND ALBUTEROL SULFATE 2.5; .5 MG/3ML; MG/3ML
1 SOLUTION RESPIRATORY (INHALATION) ONCE
Status: COMPLETED | OUTPATIENT
Start: 2020-10-05 | End: 2020-10-05

## 2020-10-05 RX ORDER — ONDANSETRON 2 MG/ML
4 INJECTION INTRAMUSCULAR; INTRAVENOUS EVERY 6 HOURS PRN
Status: DISCONTINUED | OUTPATIENT
Start: 2020-10-05 | End: 2020-10-09 | Stop reason: HOSPADM

## 2020-10-05 RX ORDER — HYDRALAZINE HYDROCHLORIDE 50 MG/1
50 TABLET, FILM COATED ORAL EVERY 8 HOURS SCHEDULED
Status: DISCONTINUED | OUTPATIENT
Start: 2020-10-05 | End: 2020-10-09 | Stop reason: HOSPADM

## 2020-10-05 RX ORDER — LABETALOL HYDROCHLORIDE 5 MG/ML
10 INJECTION, SOLUTION INTRAVENOUS EVERY 4 HOURS PRN
Status: DISCONTINUED | OUTPATIENT
Start: 2020-10-05 | End: 2020-10-09 | Stop reason: HOSPADM

## 2020-10-05 RX ADMIN — INSULIN LISPRO 8 UNITS: 100 INJECTION, SOLUTION INTRAVENOUS; SUBCUTANEOUS at 10:48

## 2020-10-05 RX ADMIN — INSULIN LISPRO 10 UNITS: 100 INJECTION, SOLUTION INTRAVENOUS; SUBCUTANEOUS at 12:20

## 2020-10-05 RX ADMIN — GUAIFENESIN 600 MG: 600 TABLET, EXTENDED RELEASE ORAL at 21:56

## 2020-10-05 RX ADMIN — AZITHROMYCIN MONOHYDRATE 500 MG: 500 INJECTION, POWDER, LYOPHILIZED, FOR SOLUTION INTRAVENOUS at 02:24

## 2020-10-05 RX ADMIN — AMLODIPINE BESYLATE 10 MG: 10 TABLET ORAL at 10:46

## 2020-10-05 RX ADMIN — CEFTRIAXONE SODIUM 1 G: 1 INJECTION, POWDER, FOR SOLUTION INTRAMUSCULAR; INTRAVENOUS at 01:35

## 2020-10-05 RX ADMIN — LACTULOSE 20 G: 20 SOLUTION ORAL at 21:56

## 2020-10-05 RX ADMIN — LACTULOSE 20 G: 20 SOLUTION ORAL at 14:38

## 2020-10-05 RX ADMIN — METHYLPREDNISOLONE SODIUM SUCCINATE 125 MG: 125 INJECTION, POWDER, FOR SOLUTION INTRAMUSCULAR; INTRAVENOUS at 01:26

## 2020-10-05 RX ADMIN — ENOXAPARIN SODIUM 30 MG: 30 INJECTION SUBCUTANEOUS at 10:47

## 2020-10-05 RX ADMIN — HYDRALAZINE HYDROCHLORIDE 50 MG: 50 TABLET, FILM COATED ORAL at 14:38

## 2020-10-05 RX ADMIN — ASPIRIN 81 MG: 81 TABLET, CHEWABLE ORAL at 10:46

## 2020-10-05 RX ADMIN — IPRATROPIUM BROMIDE AND ALBUTEROL 1 PUFF: 20; 100 SPRAY, METERED RESPIRATORY (INHALATION) at 13:39

## 2020-10-05 RX ADMIN — LABETALOL HYDROCHLORIDE 10 MG: 5 INJECTION, SOLUTION INTRAVENOUS at 01:32

## 2020-10-05 RX ADMIN — IPRATROPIUM BROMIDE AND ALBUTEROL 1 PUFF: 20; 100 SPRAY, METERED RESPIRATORY (INHALATION) at 20:49

## 2020-10-05 RX ADMIN — IPRATROPIUM BROMIDE AND ALBUTEROL SULFATE 1 AMPULE: .5; 3 SOLUTION RESPIRATORY (INHALATION) at 01:38

## 2020-10-05 RX ADMIN — INSULIN GLARGINE 12 UNITS: 100 INJECTION, SOLUTION SUBCUTANEOUS at 21:58

## 2020-10-05 RX ADMIN — HYDRALAZINE HYDROCHLORIDE 50 MG: 50 TABLET, FILM COATED ORAL at 05:58

## 2020-10-05 RX ADMIN — INSULIN LISPRO 6 UNITS: 100 INJECTION, SOLUTION INTRAVENOUS; SUBCUTANEOUS at 17:59

## 2020-10-05 RX ADMIN — GUAIFENESIN 600 MG: 600 TABLET, EXTENDED RELEASE ORAL at 10:46

## 2020-10-05 RX ADMIN — HYDRALAZINE HYDROCHLORIDE 50 MG: 50 TABLET, FILM COATED ORAL at 21:56

## 2020-10-05 ASSESSMENT — ENCOUNTER SYMPTOMS
NAUSEA: 0
SHORTNESS OF BREATH: 0
DIARRHEA: 0
SPUTUM PRODUCTION: 0
DIFFICULTY BREATHING: 1
COUGH: 0
WHEEZING: 1
EYES NEGATIVE: 1
ABDOMINAL DISTENTION: 1
VOMITING: 0
ALLERGIC/IMMUNOLOGIC NEGATIVE: 1

## 2020-10-05 ASSESSMENT — PAIN SCALES - GENERAL
PAINLEVEL_OUTOF10: 0
PAINLEVEL_OUTOF10: 0

## 2020-10-05 NOTE — ED NOTES
urinary catheter inserted per yasmine lazo order, pt josh hinkle.      Terence Martinez RN  10/05/20 [id number]

## 2020-10-05 NOTE — PROGRESS NOTES
[facility] AT [facility] Respiratory Therapy Evaluation   Current Order:  combivent q6h      Home Regimen: none per pt      Ordering Physician: maryan  Re-evaluation Date:  10/8     Diagnosis: pneumonia      Patient Status: Stable / Unstable + Physician notified    The following MDI Criteria must be met in order to convert aerosol to MDI with spacer.  If unable to meet, MDI will be converted to aerosol:  []  Patient able to demonstrate the ability to use MDI effectively  []  Patient alert and cooperative  []  Patient able to take deep breath with 5-10 second hold  []  Medication(s) available in this delivery method   []  Peak flow greater than or equal to 200 ml/min            Current Order Substituted To  (same drug, same frequency)   Aerosol to MDI [x] Albuterol Sulfate 0.083% unit dose by aerosol Albuterol Sulfate MDI 2 puffs by inhalation with spacer    [] Levalbuterol 1.25 mg unit dose by aerosol Levalbuterol MDI 2 puffs by inhalation with spacer    [] Levalbuterol 0.63 mg unit dose by aerosol Levalbuterol MDI 2 puffs by inhalation with spacer    [] Ipratropium Bromide 0.02% unit dose by aerosol Ipratropium Bromide MDI 2 puffs by inhalation with spacer    [] Duoneb (Ipratropium + Albuterol) unit dose by aerosol Ipratropium MDI + Albuterol MDI 2 puffs by inhalation w/spacer   MDI to Aerosol [] Albuterol Sulfate MDI Albuterol Sulfate 0.083% unit dose by aerosol    [] Levalbuterol MDI 2 puffs by inhalation Levalbuterol 1.25 mg unit dose by aerosol    [] Ipratropium Bromide MDI by inhalation Ipratropium Bromide 0.02% unit dose by aerosol    [] Combivent (Ipratropium + Albuterol) MDI by inhalation Duoneb (Ipratropium + Albuterol) unit dose by aerosol   Treatment Assessment [Frequency/Schedule]:  Change frequency to: ______combivent tid and albuterol mdi q2 prn____________________________________________per Protocol, P&T, MEC      Points 0 1 2 3 4   Pulmonary Status  Non-Smoker  [x]   Smoking history   < 20 pack years  [] Smoking history  ?  20 pack years  []   Pulmonary Disorder  (acute or chronic)  []   Severe or Chronic w/ Exacerbation  []     Surgical Status No [x]   Surgeries     General []   Surgery Lower []   Abdominal Thoracic or []   Upper Abdominal Thoracic with  PulmonaryDisorder  []     Chest X-ray Clear/Not  Ordered     []  Chronic Changes  Results Pending  []  Infiltrates, atelectasis, pleural effusion, or edema  [x]  Infiltrates in more than one lobe []  Infiltrate + Atelectasis, &/or pleural effusion  []    Respiratory Pattern Regular,  RR = 12-20 [x]  Increased,  RR = 21-25 []  EDWARDS, irregular,  or RR = 26-30 []  Decreased FEV1  or RR = 31-35 []  Severe SOB, use  of accessory muscles, or RR ? 35  []    Mental Status Alert, oriented,  Cooperative [x]  Confused but Follows commands []  Lethargic or unable to follow commands []  Obtunded  []  Comatose  []    Breath Sounds Clear to  auscultation  []  Decreased unilaterally or  in bases only []  Decreased  bilaterally  []  Crackles or intermittent wheezes []  Wheezes [x]    Cough Strong, Spontan., & nonproductive [x]  Strong,  spontaneous, &  productive []  Weak,  Nonproductive []  Weak, productive or  with wheezes []  No spontaneous  cough or may require suctioning []    Level of Activity Ambulatory []  Ambulatory w/ Assist  [x]  Non-ambulatory []  Paraplegic []  Quadriplegic []    Total    Score:_7______     Triage Score:_____5___      Tri       Triage:     1. (>20) Freq: Q3    2. (16-20) Freq: Q4   3. (11-15) Freq: QID & Albuterol Q2 PRN    4. (6-10) Freq: TID & Albuterol Q2 PRN    5. (0-5) Freq Q4prn

## 2020-10-05 NOTE — ED NOTES
Pt a&ox4, skin w/d/tan, pulses palp, msp's intact, pt denies sob, 0 distress, exp. Wheezing ausculted, yasmine lazo aware of it.      Marlena Irby RN  10/05/20 5917

## 2020-10-05 NOTE — H&P
she has follow up pending with gynecology. Past Medical History:      Diagnosis Date    Depression     Hypertension        Past Surgical History:  History reviewed. No pertinent surgical history. Medications Prior to Admission:    Prior to Admission medications    Medication Sig Start Date End Date Taking? Authorizing Provider   amLODIPine (NORVASC) 10 MG tablet Take 10 mg by mouth daily   Yes Historical Provider, MD   insulin lispro (HUMALOG) 100 UNIT/ML injection vial Inject 10 Units into the skin 3 times daily (before meals)   Yes Historical Provider, MD   aspirin 81 MG tablet Take 81 mg by mouth daily   Yes Historical Provider, MD       Allergies:  Patient has no known allergies. Social History:   TOBACCO:   reports that she has never smoked. She does not have any smokeless tobacco history on file. ETOH:   reports no history of alcohol use. OCCUPATION:  none    Family History:       Problem Relation Age of Onset    Cancer Mother         stomach    Cancer Father         throat    Arthritis Neg Hx     Asthma Neg Hx     Birth Defects Neg Hx     Depression Neg Hx     Diabetes Neg Hx     Early Death Neg Hx     Hearing Loss Neg Hx     Heart Disease Neg Hx     High Blood Pressure Neg Hx     High Cholesterol Neg Hx     Kidney Disease Neg Hx     Learning Disabilities Neg Hx     Mental Illness Neg Hx     Mental Retardation Neg Hx     Miscarriages / Stillbirths Neg Hx     Stroke Neg Hx     Substance Abuse Neg Hx     Vision Loss Neg Hx     Other Neg Hx        REVIEW OF SYSTEMS:  Confusion precludes the ability to obtain ROS    Physical Exam:    Vitals: BP (!) 167/69   Pulse 91   Temp 98.2 °F (36.8 °C) (Oral)   Resp 18   Ht 5' 3\" (1.6 m)   Wt 143 lb (64.9 kg)   SpO2 99%   BMI 25.33 kg/m²   Constitutional: Arousable obtunded,  Skin: Skin color, texture, turgor normal. No rashes or lesions  Eyes:Eye: Normal external eye, conjunctiva, SHARI.   ENT: Head: Normocephalic, no lesions, without obvious abnormality. Neck: no adenopathy, no carotid bruit, JVD  Respiratory: Wheezes with rhonchi on exam  Cardiovascular: regular rate and rhythm, S1, S2 normal, no murmur, click, rub or gallop  Gastrointestinal: Nontender but distended and soft. No organomegaly appreciated no fluid wave  Genitourinary: Deferred  Musculoskeletal:extremities normal, atraumatic plus pitting edema up to the thighs bilaterally  Neurologic: Mental status AAOx person and place. Not alert to situation or time. No clear focal deficits but remains confused and not able to be reoriented  Psychiatric: Confusion precludes accurate psychiatric assessment. Hematologic: No obvious bruising or bleeding    Recent Labs     10/04/20  2245 10/04/20  2342   WBC 7.0  --    HGB 8.0* 9.0*     --      Recent Labs     10/04/20  2225 10/04/20  2245 10/04/20  2342   NA  --  129*  --    K  --  4.6  --    CL  --  98  --    CO2  --  21  --    BUN  --  37*  --    CREATININE  --  1.60* 1.7*   GLUCOSE 276 261*  --    AST  --  91*  --    ALT  --  30  --    BILITOT  --  0.7  --    ALKPHOS  --  430*  --      URINALYSIS:  Recent Labs     10/04/20  2245   COLORU Yellow   PHUR 6.0   WBCUA 0-2   RBCUA 20-50*   BACTERIA Negative   CLARITYU Clear   SPECGRAV 1.012   LEUKOCYTESUR Negative   UROBILINOGEN 1.0   BILIRUBINUR Negative   BLOODU MODERATE*   GLUCOSEU Negative     -----------------------------------------------------------------   No results found. EKG: Ordered, pending    Assessment and Plan   1. Hyperglycemia in the setting of GI cancer: Consult endocrinology. Resume home Lantus 12 units nightly 10 units Humalog 3 times daily daughter reports there may be noncompliance with this regimen at home. Continue sliding scale insulin coverage. Check hemoglobin A1c.  2. Acute metabolic encephalopathy: May be secondary to the hyperglycemia as noted above however cannot rule out hepatic encephalopathy or brain mets. CT head. Check ammonia.   Aggressive glycemic control. 3. Abdominal distention and ascites: Abdominal ultrasound to evaluate for portal vein thrombosis. Consult to GI for recommendations regarding diuresis and further management. No pain to suggest SBP, daughter wants to avoid any procedures such as paracentesis if at all possible. 4. Cholangiocarcinoma or adenocarcinoma GI pathology. Patient already has follow-up with oncology at [facility], encourage continued follow-up outpatient. Continue with iron and EPO infusions as per primary oncologist.  5. Hypertensive urgency: Add hydralazine 50 3 times daily continue Norvasc 10 and add labetalol IV every 4 hours as needed  6. Right lower lobe pneumonia: Patient is status post treatment 2 weeks ago at [facility] with oral Levaquin x2 weeks. Continue antibiotics until repeat procalcitonin, if this is negative will DC antibiotics. Continue aggressive pulmonary toileting due to likely persistent atelectasis secondary to the ascites and liver disease  7.  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

## 2020-10-05 NOTE — H&P
**This is a Medical/ PA/ APRN Student Note and is charted for educational purposes. The non-physician staff attested note is not to be used for billing purposes or to guide patient care. Please see the physician modifications/ attestation for treatment plan/suggestions. This note has been reviewed and feedback has been provided to the student. *    Hospitalist History and Physical Note  Name: Matilde Nobles  Age: 70 y.o. Gender: female  CodeStatus: No Order  Allergies: No Known Allergies    Chief Complaint:Hyperglycemia (in 400's at home was given 10 units before leaving now 0 ) and Fall    Primary Care Provider: Taisha Chicas MD  InpatientTreatment Team: Treatment Team: Attending Provider: Vito Chin DO; Physician Assistant: Dariusz Chen PA-C; Patient Care Tech: Joanne Simmons; Registered Nurse: Grabiel Aragon RN  Admission Date: 10/4/2020      Subjective:   Matilde Nobles is a 70year old  female patient that presents to the ED after having high blood glucoses that could not be brought down at home. Patient has a PMH of hypertension and depression. She has a recent history of adenocarcinoma of the liver which is being managed by [place] [address] physicians. Hyperglycemia   This is a new problem. The current episode started today. The problem occurs constantly. The problem has been gradually worsening. Associated symptoms include fatigue and weakness. Pertinent negatives include no chest pain, chills, coughing, fever or headaches. Nothing aggravates the symptoms. She has tried rest for the symptoms. The treatment provided no relief. Breathing Problem   She complains of difficulty breathing and wheezing. There is no cough or sputum production. This is a new problem. The current episode started today. The problem occurs constantly. The problem has been gradually worsening. Pertinent negatives include no chest pain, fever or headaches.  Her symptoms are aggravated by nothing. Her symptoms are alleviated by nothing. She reports no improvement on treatment. Her symptoms are not alleviated by rest. There are no known risk factors for lung disease. Hypertension   This is a chronic problem. The current episode started more than 1 year ago. The problem has been waxing and waning since onset. The problem is uncontrolled. Pertinent negatives include no chest pain or headaches. Risk factors for coronary artery disease include diabetes mellitus. There are no compliance problems. Physical Exam  Vitals signs and nursing note reviewed. Exam conducted with a chaperone present. Constitutional:       General: She is awake. She is in acute distress. Appearance: She is ill-appearing. HENT:      Head: Normocephalic and atraumatic. Nose: Nose normal.      Mouth/Throat:      Mouth: Mucous membranes are moist.   Eyes:      General: Scleral icterus present. Pupils: Pupils are equal, round, and reactive to light. Neck:      Musculoskeletal: Normal range of motion and neck supple. Cardiovascular:      Rate and Rhythm: Tachycardia present. Pulses: Normal pulses. Radial pulses are 2+ on the right side and 2+ on the left side. Dorsalis pedis pulses are 2+ on the right side and 2+ on the left side. Heart sounds: Normal heart sounds, S1 normal and S2 normal.   Pulmonary:      Effort: Pulmonary effort is normal.      Breath sounds: Wheezing and rales present. Abdominal:      General: Abdomen is flat. There is distension. Tenderness: There is no abdominal tenderness. There is no guarding. Musculoskeletal: Normal range of motion. Right lower le+ Pitting Edema present. Left lower le+ Pitting Edema present. Skin:     General: Skin is warm and dry. Capillary Refill: Capillary refill takes less than 2 seconds. Neurological:      Mental Status: She is alert. She is disoriented. GCS: GCS eye subscore is 4.  GCS verbal subscore is 4. GCS motor subscore is 6. Motor: Weakness present. Psychiatric:         Attention and Perception: She is inattentive. Speech: Speech is delayed and slurred. Behavior: Behavior is slowed and withdrawn. Behavior is cooperative. Thought Content: Thought content normal.         Cognition and Memory: She exhibits impaired recent memory. Judgment: Judgment normal.         Review of Systems   Constitutional: Positive for fatigue. Negative for chills and fever. HENT: Negative. Eyes: Negative. Respiratory: Positive for wheezing. Negative for cough and sputum production. Cardiovascular: Positive for leg swelling. Negative for chest pain. Gastrointestinal: Positive for abdominal distention. Endocrine: Negative. Genitourinary: Negative. Musculoskeletal: Negative. Skin: Negative. Allergic/Immunologic: Negative. Neurological: Positive for weakness. Negative for headaches. Hematological: Negative. Psychiatric/Behavioral: Positive for confusion and decreased concentration. Medications:  Reviewed  Prior to Admission medications    Medication Sig Start Date End Date Taking?  Authorizing Provider   amLODIPine (NORVASC) 10 MG tablet Take 10 mg by mouth daily    Historical Provider, MD   insulin lispro protamine & lispro (HUMALOG MIX) (75-25) 100 UNIT per ML SUSP injection vial Inject 10 Units into the skin 3 times daily (before meals)    Historical Provider, MD   aspirin 81 MG tablet Take 81 mg by mouth daily    Historical Provider, MD   glimepiride (AMARYL) 2 MG tablet Take 4 mg by mouth every morning (before breakfast)  1/2/15   Historical Provider, MD       Current Facility-Administered Medications   Medication Dose Route Frequency Provider Last Rate Last Dose    azithromycin (ZITHROMAX) 500 mg in D5W 250ml addavial  500 mg Intravenous Once Crispin Horn PA-C        iopamidol (ISOVUE-300) 61 % injection 100 mL  100 mL Intravenous ONCE PRN Corie Osborn PA-C         Current Outpatient Medications   Medication Sig Dispense Refill    amLODIPine (NORVASC) 10 MG tablet Take 10 mg by mouth daily      insulin lispro protamine & lispro (HUMALOG MIX) (75-25) 100 UNIT per ML SUSP injection vial Inject 10 Units into the skin 3 times daily (before meals)      aspirin 81 MG tablet Take 81 mg by mouth daily      glimepiride (AMARYL) 2 MG tablet Take 4 mg by mouth every morning (before breakfast)           Infusion Medications:   Scheduled Medications:    azithromycin  500 mg Intravenous Once     PRN Meds: iopamidol    Labs:   Recent Labs     10/04/20  2245 10/04/20  2342   WBC 7.0  --    HGB 8.0* 9.0*   HCT 24.5*  --      --      Recent Labs     10/04/20  2245 10/04/20  2342   *  --    K 4.6  --    CL 98  --    CO2 21  --    BUN 37*  --    CREATININE 1.60* 1.7*   CALCIUM 8.5  --      Recent Labs     10/04/20  2245   AST 91*   ALT 30   BILITOT 0.7   ALKPHOS 430*     No results for input(s): INR in the last 72 hours. No results for input(s): Cuca Beat in the last 72 hours. Urinalysis:   Lab Results   Component Value Date    NITRU Negative 10/04/2020    WBCUA 0-2 10/04/2020    WBCUA 2 07/13/2020    BACTERIA Negative 10/04/2020    RBCUA 20-50 10/04/2020    RBCUA 9 07/13/2020    BLOODU MODERATE 10/04/2020    SPECGRAV 1.012 10/04/2020    GLUCOSEU Negative 10/04/2020       Radiology:   Most recent    Chest CT      WITH CONTRAST:No results found for this or any previous visit. WITHOUT CONTRAST: No results found for this or any previous visit. CXR      2-view: No results found for this or any previous visit. Portable: No results found for this or any previous visit. Echo No results found for this or any previous visit.           Assessment/Plan:    Hyperglycemia - Accu check ac/hs, ssI coverage, A1C,   Hypertension - restart Norvasc 10 mg PO daily, PRN labetalol 10 mg IVP, telemetry monitoring  Pneumonia - Azithromycin 500 mg IV daily and Rocephin 1g IV   Chronic kidney disease  - consult to nephrologist, hold nephrotoxic drugs  Volume overload - IV lasix x1    Active Hospital Problems    Diagnosis Date Noted    Weakness [R53.1] 10/05/2020       Additional work up or/and treatment plan may be added today or then after based on clinical progression. I am managing a portion of pt care. Some medical issues are handled byother specialists. Additional work up and treatment should be done in out pt setting by pt PCP and other out pt providers. In addition to examining and evaluating pt, I spent additional time explaining care, normaland abnormal findings, and treatment plan. All of pt questions were answered. Counseling, diet and education were provided. Case will be discussed with nursing staff when appropriate. Family will be updated if and whenappropriate. Electronically signed by Malinda Solares on 10/5/2020 at 2:20 AM   **This is a Medical/ PA/ APRN Student Note and is charted for educational purposes. The non-physician staff attested note is not to be used for billing purposes or to guide patient care. Please see the physician modifications/ attestation for treatment plan/suggestions. This note has been reviewed and feedback has been provided to the student.  *

## 2020-10-05 NOTE — CARE COORDINATION
Spoke with Aicha from [facility]. They will accept.   When patient is ready to discharge, please fax discharge order and AVS.  Electronically signed by Cathleen Hill RN on 10/5/2020 at 4:06 PM

## 2020-10-05 NOTE — CARE COORDINATION
Patient currently in [facility], call placed to daughter and spouse to complete the CMI as follows:        Steffany Avalos Case Management Initial Discharge Assessment    Met with Family via telephone to discuss discharge plan. PCP: Katty Al MD                                Date of Last Visit: every month    If no PCP, list provided? N/A    Discharge Planning    Living Arrangements: independently at home    Who do you live with? Spouse Vinnie     Who helps you with your care:  self    If lives at home:     Do you have any barriers navigating in your home? no    Patient can perform ADL? Yes    Current Services (outpatient and in home) :  None    Dialysis: No    Is transportation available to get to your appointments? Yes    DME Equipment:  no    Respiratory equipment: None    Respiratory provider:  no     Pharmacy:  yes - [address] [place] [address] with Medication Assistance Program?  No      Patient agreeable to [place] [id number]? Yes, [facility]    Patient agreeable to SNF/Rehab? No    Other discharge needs identified? OUTPATIENT DIABETIC EDUCATION    [place] of choice list provided with basic dialogue that supports the patient's individualized plan of care/goals and shares the quality data associated with the providers. Yes    Does Patient Have a High-Risk for Readmission Diagnosis (CHF, PN, MI, COPD)? No    The plan for Transition of Care is related to the following treatment goals:RULE OUT COVID, CONTROL OF BLOOD SUGARS    Initial Discharge Plan? HOME WITH [place] [id number]     The  patient representatives, Cristina Arguelles, spouse and daughter were provided with choice of any post-acute providers for care and equipment and agrees with discharge plan  Yes    Electronically signed by Abelardo Mcdowell RN on 10/5/2020 at 2:37 PM

## 2020-10-05 NOTE — ED NOTES
Pt a&ox4, skin w/d/tan, pulses palp, msp's intact, 0 distress, 0 N&7, 0 pain, 0 problems, prolapsed uterus noted, yasmine lazo at bedside. Eugenio lazo aware of pt's bp, 0 new orders. Will monitor.      Papa Santillan RN  10/05/20 9925

## 2020-10-05 NOTE — ED PROVIDER NOTES
[address] [facility] ED  EMERGENCY DEPARTMENT ENCOUNTER      Pt Name: Lynn Whitfield  MRN: [id number]  [place] 1945  Date of evaluation: 10/4/2020  Provider: Mariah Mancini PA-C    CHIEF COMPLAINT       Chief Complaint   Patient presents with    Hyperglycemia     in 400's at home was given 10 units before leaving now [address] [facility],[address]   (Location/Symptom, Timing/Onset, Context/Setting, Quality, Duration, Modifying Factors, Severity)  Note limiting factors. Lynn Whitfield is a 70 y.o. female who presents to the emergency department with a 2-day history of hyperglycemia. Patient also states that she had fallen yesterday landing on her her left gluteus region has been having pain to the area since then. Patient does complain of some mild suprapubic abdominal pain but denies any recent vomiting or diarrhea. Patient states that her blood sugars have been in the 400s and daughter did give 10 units of insulin prior to come to the emergency department and blood sugar is now 240s. Patient denies any recent fevers, cough, chest congestion, shortness of breath. Patient denies any sore throats or difficulty swallowing. HPI    Nursing Notes were reviewed. REVIEW OF SYSTEMS    (2-9 systems for level 4, 10 or more for level 5)     Review of Systems   Constitutional: Negative for diaphoresis and fever. HENT: Negative for hearing loss and trouble swallowing. Eyes: Negative for pain. Respiratory: Negative for apnea and shortness of breath. Cardiovascular: Negative for chest pain. Gastrointestinal: Positive for abdominal pain. Negative for diarrhea and vomiting. Endocrine: Negative. Genitourinary: Negative for hematuria. Musculoskeletal: Negative for neck pain and neck stiffness. Skin: Negative for color change. Allergic/Immunologic: Negative. Neurological: Negative for dizziness and numbness. Hematological: Negative. Psychiatric/Behavioral: Negative. All other systems reviewed and are negative. Except as noted above the remainder of the review of systems was reviewed and negative. PAST MEDICAL HISTORY     Past Medical History:   Diagnosis Date    Depression     Hypertension          SURGICAL HISTORY     History reviewed. No pertinent surgical history. CURRENT MEDICATIONS       Previous Medications    AMLODIPINE (NORVASC) 10 MG TABLET    Take 10 mg by mouth daily    ASPIRIN 81 MG TABLET    Take 81 mg by mouth daily    GLIMEPIRIDE (AMARYL) 2 MG TABLET    Take 4 mg by mouth every morning (before breakfast)     INSULIN LISPRO PROTAMINE & LISPRO (HUMALOG MIX) (75-25) 100 UNIT PER ML SUSP INJECTION VIAL    Inject 10 Units into the skin 3 times daily (before meals)       ALLERGIES     Patient has no known allergies.     FAMILY HISTORY       Family History   Problem Relation Age of Onset    Cancer Mother         stomach    Cancer Father         throat    Arthritis Neg Hx     Asthma Neg Hx     Birth Defects Neg Hx     Depression Neg Hx     Diabetes Neg Hx     Early Death Neg Hx     Hearing Loss Neg Hx     Heart Disease Neg Hx     High Blood Pressure Neg Hx     High Cholesterol Neg Hx     Kidney Disease Neg Hx     Learning Disabilities Neg Hx     Mental Illness Neg Hx     Mental Retardation Neg Hx     Miscarriages / Stillbirths Neg Hx     Stroke Neg Hx     Substance Abuse Neg Hx     Vision Loss Neg Hx     Other Neg Hx           SOCIAL HISTORY       Social History     Socioeconomic History    Marital status:      Spouse name: None    Number of children: None    Years of education: None    Highest education level: None   Occupational History    None   Social Needs    Financial resource strain: None    Food insecurity     Worry: None     Inability: None    Transportation needs     Medical: None     Non-medical: None   Tobacco Use    Smoking status: Never Smoker   Substance and Sexual Activity    Alcohol use: No    Drug use: No    Sexual activity: Yes     Partners: Male   Lifestyle    Physical activity     Days per week: None     Minutes per session: None    Stress: None   Relationships    Social connections     Talks on phone: None     Gets together: None     Attends Spiritism service: None     Active member of club or organization: None     Attends meetings of clubs or organizations: None     Relationship status: None    Intimate partner violence     Fear of current or ex partner: None     Emotionally abused: None     Physically abused: None     Forced sexual activity: None   Other Topics Concern    None   Social History Narrative    None       SCREENINGS        [place] Coma Scale  Eye Opening: Spontaneous  Best Verbal Response: Oriented  Best Motor Response: Obeys commands  [place] Coma Scale Score: 15               PHYSICAL EXAM    (up to 7 for level 4, 8 or more for level 5)     ED Triage Vitals [10/04/20 2225]   BP Temp Temp Source Pulse Resp SpO2 Height Weight   (!) 191/85 97.4 °F (36.3 °C) Temporal 85 18 95 % 5' 3\" (1.6 m) 143 lb (64.9 kg)       Physical Exam  Vitals signs and nursing note reviewed. Constitutional:       General: She is not in acute distress. Appearance: She is well-developed. She is not diaphoretic. HENT:      Head: Normocephalic and atraumatic. Mouth/Throat:      Pharynx: No oropharyngeal exudate. Eyes:      General: No scleral icterus. Conjunctiva/sclera: Conjunctivae normal.      Pupils: Pupils are equal, round, and reactive to light. Neck:      Musculoskeletal: Normal range of motion and neck supple. Trachea: No tracheal deviation. Cardiovascular:      Rate and Rhythm: Normal rate. Heart sounds: Normal heart sounds. Pulmonary:      Effort: Pulmonary effort is normal. No respiratory distress. Breath sounds: Normal breath sounds. Abdominal:      General: Bowel sounds are normal. There is no distension. Palpations: Abdomen is soft. Musculoskeletal: Normal range of motion. Left hip: She exhibits tenderness. She exhibits no swelling and no deformity. Legs:    Skin:     General: Skin is warm and dry. Findings: No erythema or rash. Neurological:      Mental Status: She is alert and oriented to person, place, and time. Cranial Nerves: No cranial nerve deficit. Motor: No abnormal muscle tone. Psychiatric:         Behavior: Behavior normal.         Thought Content:  Thought content normal.         Judgment: Judgment normal.         DIAGNOSTIC RESULTS     EKG: All EKG's are interpreted by the Emergency Department Physician who either signs or Co-signs this chart in the absence of a cardiologist.        RADIOLOGY:   Non-plain film images such as CT, Ultrasound and MRI are read by the radiologist. Plain radiographic images are visualized and preliminarily interpreted by the emergency physician with the below findings:    RLL pneumonia  Interpretation per the Radiologist below, if available at the time of this note:    XR HIP 2-3 VW W PELVIS LEFT    (Results Pending)   CT ABDOMEN PELVIS WO CONTRAST Additional Contrast? None    (Results Pending)   XR CHEST PORTABLE    (Results Pending)         ED BEDSIDE ULTRASOUND:   Performed by ED Physician - none    LABS:  Labs Reviewed   COMPREHENSIVE METABOLIC PANEL - Abnormal; Notable for the following components:       Result Value    Sodium 129 (*)     Glucose 261 (*)     BUN 37 (*)     CREATININE 1.60 (*)     GFR Non- 31.6 (*)     GFR  38.2 (*)     Alb 2.2 (*)     Alkaline Phosphatase 430 (*)     AST 91 (*)     Globulin 4.9 (*)     All other components within normal limits   CBC WITH AUTO DIFFERENTIAL - Abnormal; Notable for the following components:    RBC 3.65 (*)     Hemoglobin 8.0 (*)     Hematocrit 24.5 (*)     MCV 67.1 (*)     MCH 21.8 (*)     MCHC 32.5 (*)     RDW 20.7 (*)     All other components within normal limits   URINE RT REFLEX TO CULTURE - Abnormal; Notable for the following components:    Blood, Urine MODERATE (*)     Protein, UA >=300 (*)     All other components within normal limits   POCT GLUCOSE - Abnormal; Notable for the following components:    POC Glucose 276 (*)     All other components within normal limits   POCT GLUCOSE - Abnormal; Notable for the following components:    POC Glucose 254 (*)     All other components within normal limits   POCT VENOUS - Abnormal; Notable for the following components:    POC Sodium 134 (*)     POC Glucose 270 (*)     POC Creatinine 1.7 (*)     GFR Non- 29 (*)     GFR  36 (*)     Calcium, Ion 1.07 (*)     pH, Aleksandar 7.459 (*)     pCO2, Aleksandar 29.5 (*)     HCO3, Venous 21.0 (*)     Hemoglobin 9.0 (*)     POC Hematocrit 26 (*)     All other components within normal limits   POCT GLUCOSE - Normal   CULTURE, BLOOD 2   CULTURE, BLOOD 1   LACTIC ACID, PLASMA   LIPASE   MAGNESIUM   BRAIN NATRIURETIC PEPTIDE   BETA-HYDROXYBUTYRATE   MICROSCOPIC URINALYSIS   PROCALCITONIN   POCT EPOC BLOOD GAS, LACTIC ACID, ICA       All other labs were within normal range or not returned as of this dictation. EMERGENCY DEPARTMENT COURSE and DIFFERENTIAL DIAGNOSIS/MDM:   Vitals:    Vitals:    10/05/20 0030 10/05/20 0045 10/05/20 0116 10/05/20 0130   BP: (!) 185/87 (!) 190/81 (!) 192/91 (!) 224/87   Pulse: 92 99 100 108   Resp: 17 19 17 20   Temp:       TempSrc:       SpO2: 95% 92% 100% 100%   Weight:       Height:         Patient is a poor historian but came to the emergency department with elevated blood sugars over the past several days. Patient received 10 units of insulin prior to coming to the ER and blood sugar was 250 on the ER arrival.  Patient initially had declined any recent cough or congestion however patient has had persistent cough during her ER stay. Patient was also found to have a vaginal prolapse in the emergency room.   This was reduced with manual pressure however the prolapse recurs soon as the Lego. Patient does state that she has had a history of prolapse like this in the past.  Patient's chest x-ray shows a right lower lobe pneumonia and given the elevated blood sugars, cough, congestion I will treat the patient for suspected infiltrate in the right lower lobe. MDM      REASSESSMENT          CONSULTS:  None    PROCEDURES:  Unless otherwise noted below, none     Procedures        FINAL IMPRESSION      1. Pneumonia of right lower lobe due to infectious organism    2. Generalized weakness    3. Hyperglycemia    4. Vaginal prolapse          DISPOSITION/PLAN   DISPOSITION Admitted 10/05/2020 01:16:00 AM      PATIENT REFERRED TO:  No follow-up provider specified. DISCHARGE MEDICATIONS:  New Prescriptions    No medications on file     Controlled Substances Monitoring:     No flowsheet data found.     (Please note that portions of this note were completed with a voice recognition program.  Efforts were made to edit the dictations but occasionally words are mis-transcribed.)    Crispin Horn PA-C (electronically signed)  Attending Emergency Physician            Crispin Horn PA-C  10/05/20 0140

## 2020-10-05 NOTE — CONSULTS
Infectious Diseases Inpatient Consult Note      Reason for Consult:   High procalcitonin  Requesting Physician:   Dr. Jennifer Leger  Primary Care Physician:  Danita Aschoff, MD  History Obtained From:   Pt, EPIC    Admit Date: 10/4/2020  Hospital Day: 2      HISTORY OF PRESENT ILLNESS:  This is a 70 y.o. female was admitted to [facility],  from home  through ER with acute confusion and hyperglycemia. Patient was recently diagnosed with cholangiocarcinoma at [facility]. No treatment was started yet. Since was admitted with pneumonia and was started on IV Rocephin and Zithromax. Currently in airborne isolation for possible COVID-19 with 1- PCR. Had severe progressive weight loss over the past few months, severe bilateral leg swelling and increased abdominal distention. She reported some cough prior to admission which is resolved currently. With a Hanks catheter placed on admission. Patient's confusion is resolved at this point. I discussed with the patient CT scan finding with positive liver lesions and ascites. Patient reports that she was unaware that her cancer was just advanced. She denies any fevers or chills    CHIEF COMPLAINT:       Past Medical History:   Diagnosis Date    Depression     Hypertension        History reviewed. No pertinent surgical history.     Current Medications:     enoxaparin  30 mg Subcutaneous Daily    insulin lispro  0-12 Units Subcutaneous TID WC    insulin lispro  0-6 Units Subcutaneous Nightly    guaiFENesin  600 mg Oral BID    [START ON 10/6/2020] cefTRIAXone (ROCEPHIN) IV  1 g Intravenous Q24H    [START ON 10/6/2020] azithromycin  250 mg Intravenous Q24H    insulin lispro  10 Units Subcutaneous TID AC    amLODIPine  10 mg Oral Daily    aspirin  81 mg Oral Daily    insulin glargine  12 Units Subcutaneous Nightly    hydrALAZINE  50 mg Oral 3 times per day    albuterol-ipratropium  1 puff Inhalation TID    lactulose  20 g Oral BID       Allergies:  Patient has no known allergies.     Social History     Socioeconomic History    Marital status:      Spouse name: Not on file    Number of children: Not on file    Years of education: Not on file    Highest education level: Not on file   Occupational History    Not on file   Social Needs    Financial resource strain: Not on file    Food insecurity     Worry: Not on file     Inability: Not on file    Transportation needs     Medical: Not on file     Non-medical: Not on file   Tobacco Use    Smoking status: Never Smoker   Substance and Sexual Activity    Alcohol use: No    Drug use: No    Sexual activity: Yes     Partners: Male   Lifestyle    Physical activity     Days per week: Not on file     Minutes per session: Not on file    Stress: Not on file   Relationships    Social connections     Talks on phone: Not on file     Gets together: Not on file     Attends Anglican service: Not on file     Active member of club or organization: Not on file     Attends meetings of clubs or organizations: Not on file     Relationship status: Not on file    Intimate partner violence     Fear of current or ex partner: Not on file     Emotionally abused: Not on file     Physically abused: Not on file     Forced sexual activity: Not on file   Other Topics Concern    Not on file   Social History Narrative    Not on file         Family History:   Family History   Problem Relation Age of Onset    Cancer Mother         stomach    Cancer Father         throat    Arthritis Neg Hx     Asthma Neg Hx     Birth Defects Neg Hx     Depression Neg Hx     Diabetes Neg Hx     Early Death Neg Hx     Hearing Loss Neg Hx     Heart Disease Neg Hx     High Blood Pressure Neg Hx     High Cholesterol Neg Hx     Kidney Disease Neg Hx     Learning Disabilities Neg Hx     Mental Illness Neg Hx     Mental Retardation Neg Hx     Miscarriages / Stillbirths Neg Hx     Stroke Neg Hx     Substance Abuse Neg Hx     Vision Loss Neg Hx     Other Neg Hx        Review of Systems   Constitutional: Positive for unexpected weight change. Cardiovascular: Positive for leg swelling. Gastrointestinal: Positive for abdominal distention. All other systems reviewed and are negative. 14 system review is negative other than HPI    Physical Exam  Vitals:    10/05/20 0558 10/05/20 0959 10/05/20 1342 10/05/20 1436   BP: (!) 190/79 (!) 107/37  (!) 110/46   Pulse:  85 80 79   Resp:  20 16 20   Temp:    97.2 °F (36.2 °C)   TempSrc:    Oral   SpO2:  100% 100% 100%   Weight:       Height:         General Appearance: alert and oriented to person, place, frail-appearing with bitemporal wasting, in no acute distress  Skin: warm and dry, no rash. Head: normocephalic and atraumatic  Eyes: extraocular eye movements intact, conjunctivae normal, anicteric sclerae  ENT: oropharynx clear and moist with normal mucous membranes.  No thrush  Lungs: normal respiratory effort, bibasilar Rales no rhonchi, no crackles, no wheezes  Heart:RRR, nl S1/S2, no murmur  Abdomen: Distended with ascites no H-S-megaly, + BS  NEUROLOGICAL: alert and oriented x 3, no focal deficits  Positive bilateral leg edema  No erythema, no warmth, no tenderness  Clear yellow urine in Hanks      DATA:    Lab Results   Component Value Date    WBC 7.0 10/04/2020    HGB 9.0 (L) 10/04/2020    HCT 24.5 (L) 10/04/2020    MCV 67.1 (L) 10/04/2020     10/04/2020     Lab Results   Component Value Date    CREATININE 1.47 (H) 10/05/2020    BUN 33 (H) 10/05/2020     (L) 10/05/2020    K 3.9 10/05/2020     10/05/2020    CO2 19 (L) 10/05/2020       Hepatic Function Panel:   Lab Results   Component Value Date    ALKPHOS 405 10/05/2020    ALT 29 10/05/2020    AST 73 10/05/2020    PROT 6.8 10/05/2020    BILITOT 0.7 10/05/2020    BILIDIR 0.5 10/05/2020    IBILI 0.2 10/05/2020    LABALBU 2.0 10/05/2020    LABALBU 2.7 07/13/2020             Imaging:   Chest x-ray bibasilar infiltrates      IMPRESSION:      · community-acquired pneumonia  · Bilateral hydronephrosis  · cholangiocarcinoma with ascites and liver lesions  · High procalcitonin level makes it less likely to be a viral infection    Patient Active Problem List   Diagnosis    Erythropoietin deficiency anemia    Chronic kidney disease, stage III (moderate)    Weakness       PLAN:  · Continue IV Rocephin and Zithromax  · Check blood and urine culture  · Repeat COVID-19  · Continue isolation for possible COVID-19  · Consult palliative care  · Recommend heme-onc    Discussed with patient and     Wes Huerta MD

## 2020-10-05 NOTE — PROGRESS NOTES
Progress Note  Date:10/5/2020       HRWS:[id number]  Patient Name:Jaye Lynn     YOB: 1945     Age:75 y.o. Subjective    Subjective:  Symptoms:  She reports malaise and weakness. No shortness of breath, cough, chest pain, headache, chest pressure, anorexia, diarrhea or anxiety. Diet:  No nausea or vomiting. Review of Systems   Respiratory: Negative for cough and shortness of breath. Cardiovascular: Negative for chest pain. Gastrointestinal: Negative for anorexia, diarrhea, nausea and vomiting. Neurological: Positive for weakness. Objective         Vitals Last 24 Hours:  TEMPERATURE:  Temp  Av.9 °F (36.6 °C)  Min: 97.4 °F (36.3 °C)  Max: 98.2 °F (36.8 °C)  RESPIRATIONS RANGE: Resp  Av.4  Min: 14  Max: 28  PULSE OXIMETRY RANGE: SpO2  Av.9 %  Min: 92 %  Max: 100 %  PULSE RANGE: Pulse  Av.9  Min: 80  Max: 108  BLOOD PRESSURE RANGE: Systolic (79TZL), YHM:063 , Min:107 , RLY:902   ; Diastolic (62LWG), HXE:27, Min:37, Max:91    I/O (24Hr): Intake/Output Summary (Last 24 hours) at 10/5/2020 1354  Last data filed at 10/5/2020 0217  Gross per 24 hour   Intake 50 ml   Output --   Net 50 ml     Objective:  General Appearance:  Ill-appearing. Vital signs: (most recent): Blood pressure (!) 107/37, pulse 80, temperature 98.2 °F (36.8 °C), temperature source Oral, resp. rate 16, height 5' 3\" (1.6 m), weight 143 lb (64.9 kg), SpO2 100 %. HEENT: Normal HEENT exam.    Lungs:  Normal effort and normal respiratory rate. Abdomen: Abdomen is distended. There is generalized tenderness. Pulses: Distal pulses are intact. Neurological: Patient is alert. Pupils:  Pupils are equal, round, and reactive to light. Skin:  Warm and dry.       Labs/Imaging/Diagnostics    Labs:  CBC:  Recent Labs     10/04/20  2245 10/04/20  2342   WBC 7.0  --    RBC 3.65*  --    HGB 8.0* 9.0*   HCT 24.5*  --    MCV 67.1*  --    RDW 20.7*  --      -- CHEMISTRIES:  Recent Labs     10/04/20  2225 10/04/20  2245 10/04/20  2342 10/05/20  0525   NA  --  129*  --  132*   K  --  4.6  --  3.9   CL  --  98  --  101   CO2  --  21  --  19*   BUN  --  37*  --  33*   CREATININE  --  1.60* 1.7* 1.47*   GLUCOSE 276 261*  --  203*   PHOS  --   --   --  3.1   MG  --  1.9  --   --      PT/INR:No results for input(s): PROTIME, INR in the last 72 hours. APTT:No results for input(s): APTT in the last 72 hours. LIVER PROFILE:  Recent Labs     10/04/20  2245 10/05/20  0525   AST 91* 73*   ALT 30 29   BILIDIR  --  0.5*   BILITOT 0.7 0.7   ALKPHOS 430* 405*       Imaging Last 24 Hours:  Ct Abdomen Pelvis Wo Contrast Additional Contrast? None    Result Date: 10/5/2020  CT of the Abdomen and Pelvis without intravenous contrast medium History:  Diffuse abdominal pain. Recent fall. Abdominal distention. History renal disease. Technical Factors: CT imaging of the abdomen and pelvis were obtained and formatted as 5 mm contiguous axial images from the domes of the diaphragm to the symphysis pubis. Sagittal and coronal reconstructions were also obtained. Oral contrast medium:  None. Intravenous contrast medium:  None. Comparison:  None. Findings: Study limited secondary to lack of intravenous and oral contrast media. Lungs:  Small bilateral pleural effusions with dependent reticular and subsegmental atelectatic bilaterally, greater on right. Liver:  Normal in size, shape, and attenuation. Small in size and nodular in contour. Multiple ill-defined and rounded areas of decreased attenuation throughout the liver. Largest area affected is within the right hepatic lobe with low attenuation foci measuring up to 4 x 6 cm. Bile Ducts:  Normal in caliber. Gallbladder:  No stones or wall thickening. Pancreas:  Normal without masses, cysts, ductal dilatation or calcification. Spleen:  Normal in size without masses or calcifications. No splenules. Kidneys:  Normal in size.  Moderate right, with mild to moderate left pelvocaliectasis. Renal vascular calcification identified bilaterally with possible superimposed bilateral renal calculi measuring up to 2 mm. 2 cm cyst lateral midpole left kidney. Adrenals:  Normal. Small bowel:  Normal in caliber. Not well visualized secondary to lack of opacification. Appendix:  Not visualized. Colon:  Normal in caliber. Peritoneum:  No free air. Free fluid is identified within the right and anterior perihepatic spaces, subphrenic and perisplenic spaces, mesentery, Morison's pouch and bilateral paracolic gutters, and continuing to extending caudally into the pelvis. Vessels: Aorta normal in course and caliber. Probable bilateral femoral-popliteal bypass grafts. Lymph nodes:  Retroperitoneal:  No enlarged retroperitoneal lymph nodes. Mesenteric:  No enlarged mesenteric lymph nodes. Pelvic: No enlarged pelvic lymph nodes. Ureters: Not well visualized. Bladder: No wall thickening. Reproductive organs: No pelvic masses. Abdominal Wall:  No hernia identified  Diffuse edematous change of the abdominal and pelvic sidewalls. Bones:  No bone lesions. Diffuse disc space narrowing, T6-7 through T11-12 disc spaces, greatest at T7-8 through T9-10 disc spaces. No post operative changes. Multiple ill-defined and rounded low attenuation foci throughout the right and left hepatic lobes. Malignancy diagnosis of exclusion. Cirrhosis. Anasarca, with large volume ascites. Moderate right and mild to moderate left hydronephrosis with bilateral renal vascular calcification, and possible superimposed bilateral renal calculi measuring up to 2 mm. No etiology of obstruction identified on this noncontrast enhanced study. All CT scans at this facility use dose modulation, iterative reconstruction, and/or weight based dosing when appropriate to reduce radiation dose to as low as reasonably achievable.      Ct Head Wo Contrast    Result Date: 10/5/2020  EXAMINATION: CT HEAD WO CONTRAST  DATE AND TIME:10/5/2020 7:36 AM CLINICAL HISTORY: Severe headache.  metabolic encephalopathy  COMPARISON: None TECHNIQUE:Axial CT from skull base to vertex without  contrast..  All CT scans at this facility use dose modulation, iterative reconstruction, and/or weight based dosing when appropriate to reduce radiation dose to as low as reasonably achievable. FINDINGS CSF spaces: Ventricles are normal in size and position. Sulci are appropriate for age. Brain parenchyma: No  parenchymal mass,  mass effect,  signs of acute infarct, or extra-axial fluid. There are mild patchy nonspecific white matter hypodensities that may be related to microvascular ischemic change or  normal aging. Hemorrhage:No acute intracranial hemorrhage. Skull base: The bony calvarium and skull base are normal.   The visualized paranasal sinuses and mastoids are clear. NO ACUTE INTRACRANIAL PATHOLOGY. Xr Chest Portable    Result Date: 10/5/2020  EXAMINATION: XR CHEST PORTABLE CLINICAL HISTORY: COUGH, SHORTNESS OF BREATH, ALTERED MENTAL STATUS COMPARISONS: None available. FINDINGS: Osseous structures intact. Cardiopericardial silhouette normal. Aorta calcified. Right diaphragm elevated. Pulmonary vasculature indistinct. Ill-defined areas increase opacity at lung bases, greater on right. BILATERAL LOWER LUNG ATELECTASIS/PNEUMONIA VERSUS EDEMA. Us Dup Abd Pel Retro Scrot Complete    Result Date: 10/5/2020  EXAMINATION: Duplex liver Doppler CLINICAL HISTORY: Abnormal liver function studies. Abnormal CT. FINDINGS: Arterial duplex of the liver performed. . The study also evaluated the liver which was extremely heterogeneous in echotexture. There is ascites. There were poorly defined areas of mixed echogenicity in the right lobe of the liver with nondescript borders. A focal mass or confluence of multiple masses within the liver suspected.  In conglomeration these measured 6.5 x 8.5 cm in the controlled  Prn meds  Adjust meds  6) chronic portal vein thrombosis  FU [id number]  Endo eval  Will update daughter once have a plan in plan  C/w SSI  Overall prognosis is poor    Electronically signed by Jeanna Oppenheim, MD on 10/5/20 at 1:54 PM EDT

## 2020-10-05 NOTE — ED NOTES
Zulma lazo notified abut pt's lungs wheezing. 0 new orders. Will monitor, 2l nc on for comfort, pt aidee. Well.      Caron Horn RN  10/05/20 3047

## 2020-10-05 NOTE — ED NOTES
Pt advised nurse when she was moving from w/c to bed that she had fallen last night 10/3/2020 and hurt her lower back/ buttocks area. Pt trauma alert was started at this time. Pts  states that she fell from standing, no LOC, and was helped back into bed by family. Pt states that she has some bruising to the buttocks. Pt denies any headache, LOC, N/V.       Bell Bee RN  10/04/20 9894

## 2020-10-05 NOTE — ED NOTES
Patients daughter, Keisha Rankin, called for update, informed that patient is being admitted, call back at [phone] to have patients  picked up     [facility], RN  10/05/20 2348

## 2020-10-05 NOTE — PROGRESS NOTES
0146-Report received from Brad Cardoza 1874 in ER.    0310-Pt brought to floor on stretcher with one person PCA assistance. Pt noted to be in Droplet [facility] precautions for r/o Covid 19, all precautions maintained by this RN. Pt assisted to hospital bed with the use of maxi slide and three person staff assistance. Pt is a+o x 3, pt is of to time. Pt denies pain of any kind and is resting comfortably in bed. Admission process completed at this time by this RN. This RN is unable to complete the pt's Home Med rec as the pt does not know her medications and the pt's  is not answering the phone and the home voice mail box is full. Pt instructed to call for assistance if she has further needs or wishes to get out of bed during the shift. Bed alarm applied for pt safety. Call bell and items are with in reach of pt. Will continue to monitor. 0330-Admission assessment completed, see Epic charting.      0354-Dr. Mobley notified by [employer] [facility] the pt's home med rec could not be completed at this time. Dr. Alvarez Persons stated to have the day shift call the pt's pharmacy. Will continue to monitor. 0430-Dr. Mobley called and spoke with this RN. He stated he was able to get a hold of the pt's daughter and he completed the pt's Home Medication Rec. Dr. Alvarez Persons questioned at this time as to if the pt's ordered CT Head was to be considered stat. Dr. Alvarez Persons stated the CT Head could wait unitl the am.      0445-Pt's daughter called and spoke to this RN at this time. Pt's daughter updated on pt condition and POC. Pt's daughter explained the pt could not have visitors while she is in  r/o for COVID 19. Much nursing support and encouragement given to pt's daughter at this time. Will continue to monitor.

## 2020-10-05 NOTE — CONSULTS
Consults    Patient Name: Juan Marquez Date: 10/4/2020 10:28 PM  MR #: [id number]  : 1945    Attending Physician: Luis Oliveira MD  Reason for consult: Ascites    History of Presenting Illness:      Magali Morris is a 70 y.o. female on hospital day 0 with a history of change in mental status and hyperglycemia, GI consult was requested because of ascites. ,  Patient was recently diagnosed to have cholangiocarcinoma and is being followed by oncology service at [facility], because of change in mental status and hyperglycemia patient was admitted. She has not received any chemotherapy, last few weeks she noticed that her abdomen was getting more distended and leg edema.,  No history of any hematemesis or melena, patient's mental status has improved and she is more alert and awake today. History Obtained From:  patient      History:      Past Medical History:   Diagnosis Date    Depression     Hypertension      History reviewed. No pertinent surgical history.     Family History  Family History   Problem Relation Age of Onset    Cancer Mother         stomach    Cancer Father         throat    Arthritis Neg Hx     Asthma Neg Hx     Birth Defects Neg Hx     Depression Neg Hx     Diabetes Neg Hx     Early Death Neg Hx     Hearing Loss Neg Hx     Heart Disease Neg Hx     High Blood Pressure Neg Hx     High Cholesterol Neg Hx     Kidney Disease Neg Hx     Learning Disabilities Neg Hx     Mental Illness Neg Hx     Mental Retardation Neg Hx     Miscarriages / Stillbirths Neg Hx     Stroke Neg Hx     Substance Abuse Neg Hx     Vision Loss Neg Hx     Other Neg Hx      [] Unable to obtain due to ventilated and/ or neurologic status    Social History     Socioeconomic History    Marital status:      Spouse name: Not on file    Number of children: Not on file    Years of education: Not on file    Highest education level: Not on file   Occupational History    Not on file Social Needs    Financial resource strain: Not on file    Food insecurity     Worry: Not on file     Inability: Not on file    Transportation needs     Medical: Not on file     Non-medical: Not on file   Tobacco Use    Smoking status: Never Smoker   Substance and Sexual Activity    Alcohol use: No    Drug use: No    Sexual activity: Yes     Partners: Male   Lifestyle    Physical activity     Days per week: Not on file     Minutes per session: Not on file    Stress: Not on file   Relationships    Social connections     Talks on phone: Not on file     Gets together: Not on file     Attends Taoism service: Not on file     Active member of club or organization: Not on file     Attends meetings of clubs or organizations: Not on file     Relationship status: Not on file    Intimate partner violence     Fear of current or ex partner: Not on file     Emotionally abused: Not on file     Physically abused: Not on file     Forced sexual activity: Not on file   Other Topics Concern    Not on file   Social History Narrative    Not on file      [] Unable to obtain due to ventilated and/ or neurologic status      Home Medications:      Medications Prior to Admission: amLODIPine (NORVASC) 10 MG tablet, Take 10 mg by mouth daily  insulin lispro (HUMALOG) 100 UNIT/ML injection vial, Inject 10 Units into the skin 3 times daily (before meals)  [DISCONTINUED] amLODIPine (NORVASC) 10 MG tablet, Take 10 mg by mouth daily  aspirin 81 MG tablet, Take 81 mg by mouth daily  [DISCONTINUED] insulin lispro protamine & lispro (HUMALOG MIX) (75-25) 100 UNIT per ML SUSP injection vial, Inject 10 Units into the skin 3 times daily (before meals)  [DISCONTINUED] glimepiride (AMARYL) 2 MG tablet, Take 4 mg by mouth every morning (before breakfast)     Current Hospital Medications:     Scheduled Meds:   enoxaparin  30 mg Subcutaneous Daily    insulin lispro  0-12 Units Subcutaneous TID     insulin lispro  0-6 Units Subcutaneous Nightly    guaiFENesin  600 mg Oral BID    [START ON 10/6/2020] cefTRIAXone (ROCEPHIN) IV  1 g Intravenous Q24H    [START ON 10/6/2020] azithromycin  250 mg Intravenous Q24H    insulin lispro  10 Units Subcutaneous TID AC    amLODIPine  10 mg Oral Daily    aspirin  81 mg Oral Daily    insulin glargine  12 Units Subcutaneous Nightly    hydrALAZINE  50 mg Oral 3 times per day    albuterol-ipratropium  1 puff Inhalation TID    lactulose  20 g Oral BID     Continuous Infusions:   dextrose       PRN Meds:.polyethylene glycol, promethazine **OR** ondansetron, labetalol, glucose, dextrose, glucagon (rDNA), dextrose, albuterol sulfate HFA, iopamidol  .  dextrose          Allergies:     No Known Allergies     Review of Systems:       [x] CV, Resp, Neuro, , and all other systems reviewed and negative other than listed in HPI.      [] Unable to obtain due to ventilated and/ or neurologic status      Objective Findings:     Vitals:   Vitals:    10/05/20 0558 10/05/20 0959 10/05/20 1342 10/05/20 1436   BP: (!) 190/79 (!) 107/37  (!) 110/46   Pulse:  85 80 79   Resp:  20 16 20   Temp:    97.2 °F (36.2 °C)   TempSrc:    Oral   SpO2:  100% 100% 100%   Weight:       Height:            Physical Examination:  General: In no acute distress  HEENT: Normocephalic,  scleral icterus. None  Neck: No jugular venous distention. Heart: Regular, no murmur, no rub/gallop. No right ventricular heave. Lungs: Clear to ascultation, no rales/wheezing/rhonchi. Good chest wall excursion. Abdomen: Distended with positive fluid wave, nontender  Extremities: 1-2+ pitting edema  Skin: Warm, dry, normal turgor, no rash, no bruise, no petichiae. Neuro: No myoclonus or tremor.   No asterixis  Psych: Normal affect    Results/ Medications reviewed 10/5/2020, 6:40 PM     Laboratory, Microbiology, Pathology, Radiology, Cardiology, Medications and Transcriptions reviewed  Scheduled Meds:   enoxaparin  30 mg Subcutaneous Daily    insulin lispro ascites. Moderate right and mild to moderate left hydronephrosis with bilateral renal vascular calcification, and possible superimposed bilateral renal calculi measuring up to 2 mm. No etiology of obstruction identified on this noncontrast enhanced study. All CT scans at this facility use dose modulation, iterative reconstruction, and/or weight based dosing when appropriate to reduce radiation dose to as low as reasonably achievable. Ct Head Wo Contrast    Result Date: 10/5/2020  EXAMINATION: CT HEAD WO CONTRAST  DATE AND TIME:10/5/2020 7:36 AM CLINICAL HISTORY: Severe headache.  metabolic encephalopathy  COMPARISON: None TECHNIQUE:Axial CT from skull base to vertex without  contrast..  All CT scans at this facility use dose modulation, iterative reconstruction, and/or weight based dosing when appropriate to reduce radiation dose to as low as reasonably achievable. FINDINGS CSF spaces: Ventricles are normal in size and position. Sulci are appropriate for age. Brain parenchyma: No  parenchymal mass,  mass effect,  signs of acute infarct, or extra-axial fluid. There are mild patchy nonspecific white matter hypodensities that may be related to microvascular ischemic change or  normal aging. Hemorrhage:No acute intracranial hemorrhage. Skull base: The bony calvarium and skull base are normal.   The visualized paranasal sinuses and mastoids are clear. NO ACUTE INTRACRANIAL PATHOLOGY. Xr Chest Portable    Result Date: 10/5/2020  EXAMINATION: XR CHEST PORTABLE CLINICAL HISTORY: COUGH, SHORTNESS OF BREATH, ALTERED MENTAL STATUS COMPARISONS: None available. FINDINGS: Osseous structures intact. Cardiopericardial silhouette normal. Aorta calcified. Right diaphragm elevated. Pulmonary vasculature indistinct. Ill-defined areas increase opacity at lung bases, greater on right. BILATERAL LOWER LUNG ATELECTASIS/PNEUMONIA VERSUS EDEMA.     Us Dup Abd Pel Retro Scrot Complete    Result Date: 10/5/2020  EXAMINATION: Duplex liver Doppler CLINICAL HISTORY: Abnormal liver function studies. Abnormal CT. FINDINGS: Arterial duplex of the liver performed. . The study also evaluated the liver which was extremely heterogeneous in echotexture. There is ascites. There were poorly defined areas of mixed echogenicity in the right lobe of the liver with nondescript borders. A focal mass or confluence of multiple masses within the liver suspected. In conglomeration these measured 6.5 x 8.5 cm in the central right lobe of the liver. Liver tumor suspected either metastatic or primary. Occasionally fatty liver and cirrhosis can have an appearance such as this and therefore confirmation with contrast-enhanced cross-sectional imaging (CT or MRI) recommended to confirm this, if not noted on outside studies. There was flow in the splenic vein to the level of the confluence. The portal vein at this level showed no flow. The vessel itself was small in caliber measuring 8 mm in diameter. No flow in the branches of the portal vein within the liver demonstrated. Arterial flow in the hepatic artery has elevated systolic velocity amplitude. Left hepatic artery systolic velocity is 998 cm/s. Flow in the common hepatic artery is 111 cm per sec. There is normal phasicity and waveform in the hepatic veins within the liver and in the IVC. The spleen was not evaluated. CHRONIC PORTAL VEIN THROMBOSIS. LIVER MASSES SUSPECTED CONSISTENT WITH MULTICENTRIC HEPATOCELLULAR CARCINOMA VERSUS METASTATIC DISEASE. CONFIRM WITH FURTHER CROSS-SECTIONAL IMAGING AS DISCUSSED IN THE REPORT. Xr Hip 2-3 Vw W Pelvis Left    Result Date: 10/5/2020  EXAMINATION: AP pelvis left hip. 3 films CLINICAL HISTORY: Trauma. Posterior left hip pain FINDINGS: AP pelvis and hip and a shoot through lateral left hip performed. The bone is adequately mineralized and grossly intact. No fracture or dislocation.  Moderate degenerative changes overlie the hip joints bilaterally. Mild degenerative changes in the lower lumbar spine. Vascular calcifications noted in the pelvis and proximal thighs bilaterally. MODERATE DEGENERATIVE HIP DISEASE. NO FRACTURE OR DISLOCATION. Impression:   Patient with intrahepatic cholangiocarcinoma now admitted with change in mental status and hyperglycemia.,  Mental status has improved, CT scan shows multiple lesions in the liver with ascites, ascites could be probably malignant ascites or possibly related to portal hypertension from multiple lesions in the liver. Plan:   Awaiting oncology evaluation and may consider paracentesis to rule out malignant ascites. Comments: Thank you for allowing us to participate in the care of this patient. Will continue to follow. Please call if questions or concerns arise.     Electronically signed by Dana Avelar MD on 10/5/2020 at 6:40 PM

## 2020-10-05 NOTE — PLAN OF CARE
symptoms  Description: Absence of psychomotor disturbance signs and symptoms  Outcome: Ongoing     Problem: Sensory Perception - Impaired:  Goal: Demonstrations of improved sensory functioning will increase  Description: Demonstrations of improved sensory functioning will increase  Outcome: Ongoing  Goal: Decrease in sensory misperception frequency  Description: Decrease in sensory misperception frequency  Outcome: Ongoing  Goal: Able to refrain from responding to false sensory perceptions  Description: Able to refrain from responding to false sensory perceptions  Outcome: Ongoing  Goal: Demonstrates accurate environmental perceptions  Description: Demonstrates accurate environmental perceptions  Outcome: Ongoing  Goal: Able to distinguish between reality-based and nonreality-based thinking  Description: Able to distinguish between reality-based and nonreality-based thinking  Outcome: Ongoing  Goal: Able to interrupt nonreality-based thinking  Description: Able to interrupt nonreality-based thinking  Outcome: Ongoing  Goal: Ability to maintain a stable neurologic state will improve  Description: Ability to maintain a stable neurologic state will improve  Outcome: Ongoing     Problem: Sleep Pattern Disturbance:  Goal: Appears well-rested  Description: Appears well-rested  Outcome: Ongoing     Problem: Mobility - Impaired:  Goal: Mobility will improve  Description: Mobility will improve  Outcome: Ongoing     Problem: Serum Glucose Level - Abnormal:  Goal: Ability to maintain appropriate glucose levels will improve  Description: Ability to maintain appropriate glucose levels will improve  Outcome: Ongoing

## 2020-10-05 NOTE — PROGRESS NOTES
1050-- assessment completed, patient alert and oriented x 3, person, place and situation, disoriented to time, able to move all extremities on command, the patient feels she is no longer weak, encouraged her to use her call light if she needed to get up and move, lungs clear but diminished, heart rate regular, bowel sounds active, abdomen noted distended, rounded, slightly taut, the patient was unable to tell me when her last bm was, all extremities noted with 1+ edema nonpitting, follows commands when instructed, call received from the patients daughter wanting us to have the doctor discharge the patient so they could take her to [facility]    1107-- message sent to Dr Ian Bradley to make him aware of the daughters wishes    18-- the patients daughter, Caren Shukla, called again to make us aware that they had changed their minds about taking their mom to [facility], they were concerned that the patient was on a Covid unit and wanted her transferred off this floor, education given to the daughter regarding why she was her and what we were doing for her, message sent to Dr Ian Bradley with the information

## 2020-10-05 NOTE — CONSULTS
Hematology/Oncology Consult  Encounter Date: 10/5/2020 7:41 PM    Ms. Wilberto Gupta is a 70 y.o. female  : 1945  MRN: [id number]  Acct Number: [de-identified]  Requesting Provider: Dr. Anum Rose    Reason for request:  Cholangiocarcinoma      CONSULTANT: Horacio Vigil MD    HPI: The pt is a 71 yo AA female who developed abd distension in late 2019 which progressively worsened and also had bilateral leg swelling. She was diagnosed to have cholangiocarcinoma and is being evaluated by oncologists at [facility]. . She has not started chemotx yet but is being considered for possible Gemzar and Oxaliplatin vs resection. She was hospitalized at [facility] [place] because of confusion and hyperglycemia . Her mental status has already improved. No fever; + weight loss 50 lb over the past year; no headache or dizziness; no epitaxis; no chest pain or cough or hemoptysis; + orthopnea' + abdominal bloating and mild pain. No rectal bleeding    Patient Active Problem List   Diagnosis    Erythropoietin deficiency anemia    Chronic kidney disease, stage III (moderate)    Weakness     Past Medical History:   Diagnosis Date    Depression     Hypertension      History reviewed. No pertinent surgical history.   Family History   Problem Relation Age of Onset    Cancer Mother         stomach    Cancer Father         throat    Arthritis Neg Hx     Asthma Neg Hx     Birth Defects Neg Hx     Depression Neg Hx     Diabetes Neg Hx     Early Death Neg Hx     Hearing Loss Neg Hx     Heart Disease Neg Hx     High Blood Pressure Neg Hx     High Cholesterol Neg Hx     Kidney Disease Neg Hx     Learning Disabilities Neg Hx     Mental Illness Neg Hx     Mental Retardation Neg Hx     Miscarriages / Stillbirths Neg Hx     Stroke Neg Hx     Substance Abuse Neg Hx     Vision Loss Neg Hx     Other Neg Hx      Social History     Socioeconomic History    Marital status:      Spouse name: Not on file    Number of children: Not on file    Years of education: Not on file    Highest education level: Not on file   Occupational History    Not on file   Social Needs    Financial resource strain: Not on file    Food insecurity     Worry: Not on file     Inability: Not on file    Transportation needs     Medical: Not on file     Non-medical: Not on file   Tobacco Use    Smoking status: Never Smoker   Substance and Sexual Activity    Alcohol use: No    Drug use: No    Sexual activity: Yes     Partners: Male   Lifestyle    Physical activity     Days per week: Not on file     Minutes per session: Not on file    Stress: Not on file   Relationships    Social connections     Talks on phone: Not on file     Gets together: Not on file     Attends Zoroastrianism service: Not on file     Active member of club or organization: Not on file     Attends meetings of clubs or organizations: Not on file     Relationship status: Not on file    Intimate partner violence     Fear of current or ex partner: Not on file     Emotionally abused: Not on file     Physically abused: Not on file     Forced sexual activity: Not on file   Other Topics Concern    Not on file   Social History Narrative    Not on file            Current Facility-Administered Medications   Medication Dose Route Frequency Provider Last Rate Last Dose    polyethylene glycol (GLYCOLAX) packet 17 g  17 g Oral Daily PRN Meir Groverra Sedar, DO        promethazine (PHENERGAN) tablet 12.5 mg  12.5 mg Oral Q6H PRN Meir Cordova Sedar, DO        Or    ondansetron (ZOFRAN) injection 4 mg  4 mg Intravenous Q6H PRN Meir Cordova Sedar, DO        enoxaparin (LOVENOX) injection 30 mg  30 mg Subcutaneous Daily Rudy CHU Sedar, DO   30 mg at 10/05/20 1047    labetalol (NORMODYNE;TRANDATE) injection 10 mg  10 mg Intravenous Q4H PRN Rudy CHU Sedar, DO        glucose (GLUTOSE) 40 % oral gel 15 g  15 g Oral PRN Lorri CHU Sedar, DO        dextrose 50 % IV solution  12.5 g Intravenous PRN Meir Cordova Sedar, DO        glucagon (rDNA) injection 1 mg  1 mg Intramuscular PRN Launie Macleod Sedar, DO        dextrose 5 % solution  100 mL/hr Intravenous PRN Launie Macleod Sedar, DO        insulin lispro (HUMALOG) injection vial 0-12 Units  0-12 Units Subcutaneous TID WC Launie Macleod Sedar, DO   6 Units at 10/05/20 1759    insulin lispro (HUMALOG) injection vial 0-6 Units  0-6 Units Subcutaneous Nightly Launie Macleod Sedar, DO        guaiFENesin [facility] [place] [facility]\"") extended release tablet 600 mg  600 mg Oral BID Launie Macleod Sedar, DO   600 mg at 10/05/20 1046    [START ON 10/6/2020] cefTRIAXone (ROCEPHIN) 1 g IVPB in 50 mL D5W minibag  1 g Intravenous Q24H Rudy D Sedar, DO        [START ON 10/6/2020] azithromycin (ZITHROMAX) 250 mg in dextrose 5 % 250 mL IVPB  250 mg Intravenous Q24H Launie Macleod Sedar, DO        insulin lispro (HUMALOG) injection vial 10 Units  10 Units Subcutaneous TID AC Rudy D Sedar, DO   10 Units at 10/05/20 1759    amLODIPine (NORVASC) tablet 10 mg  10 mg Oral Daily Viola Bell CHU Sedar, DO   10 mg at 10/05/20 1046    aspirin chewable tablet 81 mg  81 mg Oral Daily Rudy D Sedar, DO   81 mg at 10/05/20 1046    insulin glargine (LANTUS) injection vial 12 Units  12 Units Subcutaneous Nightly Launie Macleod Sedar, DO        hydrALAZINE (APRESOLINE) tablet 50 mg  50 mg Oral 3 times per day Viola Bell D Sedar, DO   50 mg at 10/05/20 1438    albuterol-ipratropium (COMBIVENT RESPIMAT)  MCG/ACT inhaler 1 puff  1 puff Inhalation TID Launie Macleod Sedar, DO   1 puff at 10/05/20 1339    albuterol sulfate  (90 Base) MCG/ACT inhaler 2 puff  2 puff Inhalation Q2H PRN Launie Macleod Sedar, DO        lactulose (CHRONULAC) 10 GM/15ML solution 20 g  20 g Oral BID Noah Urrutia MD   20 g at 10/05/20 1438    iopamidol (ISOVUE-300) 61 % injection 100 mL  100 mL Intravenous ONCE PRN Mariah Mancini PA-C         Outpatient Medications Marked as Taking for the 10/4/20 encounter [facility] Encounter)   Medication Sig Dispense Refill    amLODIPine (NORVASC) 10 MG tablet Take 10 mg by mouth daily      insulin lispro (HUMALOG) 100 UNIT/ML injection vial Inject 10 Units into the skin 3 times daily (before meals)      [DISCONTINUED] amLODIPine (NORVASC) 10 MG tablet Take 10 mg by mouth daily      aspirin 81 MG tablet Take 81 mg by mouth daily       No Known Allergies      ROS:  Unremarkable except for symptoms mentioned in HPI. PHYSICAL EXAMINATION:   VITAL SIGNS: BP (!) 110/46   Pulse 79   Temp 97.2 °F (36.2 °C) (Oral)   Resp 20   Ht 5' 3\" (1.6 m)   Wt 143 lb (64.9 kg)   SpO2 100%   BMI 25.33 kg/m²       GENERAL: In no acute distress, well- nourished, well- developed, alert and oriented to person place and time. SKIN: Warm and dry, without jaundice, ecchymoses, or petechiae. HEENT: Normocephalic, sclera anicteric, oral mucosa moist without lesion or exudate in the visible oral cavity or oropharynx,no epistaxis  NECK: supple; no JVD; no thyromegaly  NODES: No palpable adenopathy in the neck Levels I-V, bilateral supraclavicular fossae, axillary chains, or inguinal regions. LUNGS: Good inspiratory effort, no accessory muscle use, clear bilaterally, no focal wheeze, rales or rhonchi. CARDIAC: Normal HS; Regular rate and rhythm,  ABDOMINAL: Distended abd; non-tender; no mass or  organomegaly. MUSKL: no tenderness over spine or ribs. EXTREMITIES:1+bipedal edema; no calf tenderness   NEUROLOGIC: Gait not tested. No facial asymmetry; moving all extremities well; decreased sensation both lower legs   PSYCH: cooperative; pt has appropriate behavior and affect    LAB RESULTS:  Recent Results (from the past 24 hour(s))   POCT Glucose    Collection Time: 10/04/20 10:25 PM   Result Value Ref Range    Glucose 276 mg/dL    QC OK?  yes    POCT Glucose    Collection Time: 10/04/20 10:25 PM   Result Value Ref Range    POC Glucose 276 (H) 60 - 115 mg/dl    Performed on ACCU-CHEK    Comprehensive Metabolic Panel    Collection Time: 10/04/20 10:45 PM   Result Value Ref Range    Sodium 129 (L) 135 - 144 mEq/L    Potassium 4.6 3.4 - 4.9 mEq/L    Chloride 98 95 - 107 mEq/L    CO2 21 20 - 31 mEq/L    Anion Gap 10 9 - 15 mEq/L    Glucose 261 (H) 70 - 99 mg/dL    BUN 37 (H) 8 - 23 mg/dL    CREATININE 1.60 (H) 0.50 - 0.90 mg/dL    GFR Non-African American 31.6 (L) >60    GFR  38.2 (L) >60    Calcium 8.5 8.5 - 9.9 mg/dL    Total Protein 7.1 6.3 - 8.0 g/dL    Alb 2.2 (L) 3.5 - 4.6 g/dL    Total Bilirubin 0.7 0.2 - 0.7 mg/dL    Alkaline Phosphatase 430 (H) 40 - 130 U/L    ALT 30 0 - 33 U/L    AST 91 (H) 0 - 35 U/L    Globulin 4.9 (H) 2.3 - 3.5 g/dL   CBC Auto Differential    Collection Time: 10/04/20 10:45 PM   Result Value Ref Range    WBC 7.0 4.8 - 10.8 K/uL    RBC 3.65 (L) 4.20 - 5.40 M/uL    Hemoglobin 8.0 (L) 12.0 - 16.0 g/dL    Hematocrit 24.5 (L) 37.0 - 47.0 %    MCV 67.1 (L) 82.0 - 100.0 fL    MCH 21.8 (L) 27.0 - 31.3 pg    MCHC 32.5 (L) 33.0 - 37.0 %    RDW 20.7 (H) 11.5 - 14.5 %    Platelets 158 165 - 204 K/uL    PLATELET SLIDE REVIEW Normal     Neutrophils % 77.0 %    Lymphocytes % 16.0 %    Monocytes % 5.8 %    Eosinophils % 0.7 %    Basophils % 1.0 %    Neutrophils Absolute 5.4 1.4 - 6.5 K/uL    Lymphocytes Absolute 1.1 1.0 - 4.8 K/uL    Monocytes Absolute 0.4 0.2 - 0.8 K/uL    Eosinophils Absolute 0.0 0.0 - 0.7 K/uL    Basophils Absolute 0.1 0.0 - 0.2 K/uL    Smudge Cells 1.9     Anisocytosis 2+     Macrocytes 1+     Microcytes 1+     Polychromasia 1+     Hypochromia 2+     Poikilocytes 1+     Target Cells 2+    Lactic Acid, Plasma    Collection Time: 10/04/20 10:45 PM   Result Value Ref Range    Lactic Acid 1.7 0.5 - 2.2 mmol/L   Lipase    Collection Time: 10/04/20 10:45 PM   Result Value Ref Range    Lipase 31 12 - 95 U/L   Urine Reflex to Culture    Collection Time: 10/04/20 10:45 PM    Specimen: Urine, indwelling catheter   Result Value Ref Range    Color, UA Yellow Straw/Yellow    Clarity, UA Clear Clear    Glucose, Ur Negative Negative mg/dL    Bilirubin Urine Negative Negative    Ketones, Urine Negative Negative mg/dL    Specific Gravity, UA 1.012 1.005 - 1.030    Blood, Urine MODERATE (A) Negative    pH, UA 6.0 5.0 - 9.0    Protein, UA >=300 (A) Negative mg/dL    Urobilinogen, Urine 1.0 <2.0 E.U./dL    Nitrite, Urine Negative Negative    Leukocyte Esterase, Urine Negative Negative    Urine Reflex to Culture Not Indicated    Magnesium    Collection Time: 10/04/20 10:45 PM   Result Value Ref Range    Magnesium 1.9 1.7 - 2.4 mg/dL   Brain Natriuretic Peptide    Collection Time: 10/04/20 10:45 PM   Result Value Ref Range    Pro-BNP 4,054 pg/mL   Beta-Hydroxybutyrate    Collection Time: 10/04/20 10:45 PM   Result Value Ref Range    Beta-Hydroxybutyrate 1.7 0.2 - 2.8 mg/dL   Microscopic Urinalysis    Collection Time: 10/04/20 10:45 PM   Result Value Ref Range    Renal Epithelial, UA 0-2 (A) /HPF    Bacteria, UA Negative Negative /HPF    Hyaline Casts, UA 1-3 0 - 5 /HPF    WBC, UA 0-2 0 - 5 /HPF    RBC, UA 20-50 (A) 0 - 5 /HPF    Epithelial Cells, UA 6-10 0 - 5 /HPF   POCT Glucose    Collection Time: 10/04/20 10:52 PM   Result Value Ref Range    POC Glucose 254 (H) 60 - 115 mg/dl    Performed on ACCU-CHEK    POCT Venous    Collection Time: 10/04/20 11:42 PM   Result Value Ref Range    POC Sodium 134 (L) 136 - 145 mEq/L    POC Potassium 4.3 3.5 - 5.1 mEq/L    POC Chloride 103 99 - 110 mEq/L    POC Glucose 270 (H) 60 - 115 mg/dl    POC Creatinine 1.7 (H) 0.6 - 1.2 mg/dL    GFR Non-African American 29 (A) >60    GFR  36 (A) >60    Calcium, Ion 1.07 (L) 1.12 - 1.32 mmol/L    pH, [place] 7.459 (H) 7.350 - 7.450    pCO2, [place] 29.5 (L) 40.0 - 50.0 mm Hg    pO2, [place] 57 Not Established mm Hg    HCO3, Venous 21.0 (L) 23.0 - 29.0 mmol/L    Base Excess, Sena -3 -3 - 3    O2 Sat, Sen[place] 91 Not Established %    TC02 (Calc), [place] 22 Not Established mmol/L    Lactate 1.93 0.40 - 2.00 mmol/L    Hemoglobin 9.0 (L) 12.0 - 16.0 gm/dL    POC Hematocrit 26 (L) 36 - 48 %    Sample Type SENA     Performed on SEE BELOW    COVID-19 Collection Time: 10/05/20  1:44 AM   Result Value Ref Range    SARS-CoV-2, NAAT Not Detected Not Detected   EKG 12 Lead    Collection Time: 10/05/20  4:43 AM   Result Value Ref Range    Ventricular Rate 85 BPM    Atrial Rate 85 BPM    P-R Interval 162 ms    QRS Duration 132 ms    Q-T Interval 420 ms    QTc Calculation (Bazett) 499 ms    P Axis 63 degrees    R Axis -49 degrees    T Axis 15 degrees   Sodium, urine, random    Collection Time: 10/05/20  5:02 AM   Result Value Ref Range    Sodium, Ur 87 Not Established mEq/L   UREA NITROGEN, URINE    Collection Time: 10/05/20  5:02 AM   Result Value Ref Range    Urea Nitrogen, Ur 415.0 Not Established mg/dL   Creatinine, Random Urine    Collection Time: 10/05/20  5:02 AM   Result Value Ref Range    Creatinine, Ur 34.8 Not Established mg/dL   PROCALCITONIN    Collection Time: 10/05/20  5:24 AM   Result Value Ref Range    Procalcitonin 2.93 (H) 0.00 - 0.15 ng/mL   Lactic acid, plasma    Collection Time: 10/05/20  5:24 AM   Result Value Ref Range    Lactic Acid 1.6 0.5 - 2.2 mmol/L   Hemoglobin A1c    Collection Time: 10/05/20  5:24 AM   Result Value Ref Range    Hemoglobin A1C 10.5 (H) 4.8 - 5.9 %   PTH, INTACT    Collection Time: 10/05/20  5:24 AM   Result Value Ref Range    PTH 41.8 15.0 - 65.0 pg/mL   Ammonia    Collection Time: 10/05/20  5:24 AM   Result Value Ref Range    Ammonia 65 (H) 11 - 51 umol/L   Basic Metabolic Panel w/ Reflex to MG    Collection Time: 10/05/20  5:25 AM   Result Value Ref Range    Sodium 132 (L) 135 - 144 mEq/L    Potassium reflex Magnesium 3.9 3.4 - 4.9 mEq/L    Chloride 101 95 - 107 mEq/L    CO2 19 (L) 20 - 31 mEq/L    Anion Gap 12 9 - 15 mEq/L    Glucose 203 (H) 70 - 99 mg/dL    BUN 33 (H) 8 - 23 mg/dL    CREATININE 1.47 (H) 0.50 - 0.90 mg/dL    GFR Non-African American 34.8 (L) >60    GFR  42.1 (L) >60    Calcium 8.3 (L) 8.5 - 9.9 mg/dL   Hepatic function panel    Collection Time: 10/05/20  5:25 AM   Result Value Ref Range Total Protein 6.8 6.3 - 8.0 g/dL    Alb 2.0 (L) 3.5 - 4.6 g/dL    Alkaline Phosphatase 405 (H) 40 - 130 U/L    ALT 29 0 - 33 U/L    AST 73 (H) 0 - 35 U/L    Total Bilirubin 0.7 0.2 - 0.7 mg/dL    Bilirubin, Direct 0.5 (H) 0.0 - 0.4 mg/dL    Bilirubin, Indirect 0.2 0.0 - 0.6 mg/dL   Phosphorus    Collection Time: 10/05/20  5:25 AM   Result Value Ref Range    Phosphorus 3.1 2.3 - 4.8 mg/dL   POCT Glucose    Collection Time: 10/05/20  9:55 AM   Result Value Ref Range    POC Glucose 319 (H) 60 - 115 mg/dl    Performed on ACCU-CHEK    POCT Glucose    Collection Time: 10/05/20 11:29 AM   Result Value Ref Range    POC Glucose 402 (HH) 60 - 115 mg/dl    Performed on ACCU-CHEK    POCT Glucose    Collection Time: 10/05/20 12:17 PM   Result Value Ref Range    POC Glucose 399 (H) 60 - 115 mg/dl    Performed on ACCU-CHEK    POCT Glucose    Collection Time: 10/05/20  5:51 PM   Result Value Ref Range    POC Glucose 298 (H) 60 - 115 mg/dl    Performed on ACCU-CHEK      Recent Labs     10/04/20  2245 10/05/20  0525   COLORU Yellow  --    PHUR 6.0  --    WBCUA 0-2  --    RBCUA 20-50*  --    BACTERIA Negative  --    CLARITYU Clear  --    SPECGRAV 1.012  --    LEUKOCYTESUR Negative  --    UROBILINOGEN 1.0  --    BILIRUBINUR Negative  --    BLOODU MODERATE*  --    GLUCOSE 261* 203*            RADIOLOGY RESULTS:  Ct Abdomen Pelvis Wo Contrast Additional Contrast? None    Result Date: 10/5/2020  CT of the Abdomen and Pelvis without intravenous contrast medium History:  Diffuse abdominal pain. Recent fall. Abdominal distention. History renal disease. Technical Factors: CT imaging of the abdomen and pelvis were obtained and formatted as 5 mm contiguous axial images from the domes of the diaphragm to the symphysis pubis. Sagittal and coronal reconstructions were also obtained. Oral contrast medium:  None. Intravenous contrast medium:  None. Comparison:  None.  Findings: Study limited secondary to lack of intravenous and oral contrast media. Lungs:  Small bilateral pleural effusions with dependent reticular and subsegmental atelectatic bilaterally, greater on right. Liver:  Normal in size, shape, and attenuation. Small in size and nodular in contour. Multiple ill-defined and rounded areas of decreased attenuation throughout the liver. Largest area affected is within the right hepatic lobe with low attenuation foci measuring up to 4 x 6 cm. Bile Ducts:  Normal in caliber. Gallbladder:  No stones or wall thickening. Pancreas:  Normal without masses, cysts, ductal dilatation or calcification. Spleen:  Normal in size without masses or calcifications. No splenules. Kidneys:  Normal in size. Moderate right, with mild to moderate left pelvocaliectasis. Renal vascular calcification identified bilaterally with possible superimposed bilateral renal calculi measuring up to 2 mm. 2 cm cyst lateral midpole left kidney. Adrenals:  Normal. Small bowel:  Normal in caliber. Not well visualized secondary to lack of opacification. Appendix:  Not visualized. Colon:  Normal in caliber. Peritoneum:  No free air. Free fluid is identified within the right and anterior perihepatic spaces, subphrenic and perisplenic spaces, mesentery, Morison's pouch and bilateral paracolic gutters, and continuing to extending caudally into the pelvis. Vessels: Aorta normal in course and caliber. Probable bilateral femoral-popliteal bypass grafts. Lymph nodes:  Retroperitoneal:  No enlarged retroperitoneal lymph nodes. Mesenteric:  No enlarged mesenteric lymph nodes. Pelvic: No enlarged pelvic lymph nodes. Ureters: Not well visualized. Bladder: No wall thickening. Reproductive organs: No pelvic masses. Abdominal Wall:  No hernia identified  Diffuse edematous change of the abdominal and pelvic sidewalls. Bones:  No bone lesions. Diffuse disc space narrowing, T6-7 through T11-12 disc spaces, greatest at T7-8 through T9-10 disc spaces. No post operative changes.      Multiple ill-defined and rounded low attenuation foci throughout the right and left hepatic lobes. Malignancy diagnosis of exclusion. Cirrhosis. Anasarca, with large volume ascites. Moderate right and mild to moderate left hydronephrosis with bilateral renal vascular calcification, and possible superimposed bilateral renal calculi measuring up to 2 mm. No etiology of obstruction identified on this noncontrast enhanced study. All CT scans at this facility use dose modulation, iterative reconstruction, and/or weight based dosing when appropriate to reduce radiation dose to as low as reasonably achievable. Ct Head Wo Contrast    Result Date: 10/5/2020  EXAMINATION: CT HEAD WO CONTRAST  DATE AND TIME:10/5/2020 7:36 AM CLINICAL HISTORY: Severe headache.  metabolic encephalopathy  COMPARISON: None TECHNIQUE:Axial CT from skull base to vertex without  contrast..  All CT scans at this facility use dose modulation, iterative reconstruction, and/or weight based dosing when appropriate to reduce radiation dose to as low as reasonably achievable. FINDINGS CSF spaces: Ventricles are normal in size and position. Sulci are appropriate for age. Brain parenchyma: No  parenchymal mass,  mass effect,  signs of acute infarct, or extra-axial fluid. There are mild patchy nonspecific white matter hypodensities that may be related to microvascular ischemic change or  normal aging. Hemorrhage:No acute intracranial hemorrhage. Skull base: The bony calvarium and skull base are normal.   The visualized paranasal sinuses and mastoids are clear. NO ACUTE INTRACRANIAL PATHOLOGY. Xr Chest Portable    Result Date: 10/5/2020  EXAMINATION: XR CHEST PORTABLE CLINICAL HISTORY: COUGH, SHORTNESS OF BREATH, ALTERED MENTAL STATUS COMPARISONS: None available. FINDINGS: Osseous structures intact. Cardiopericardial silhouette normal. Aorta calcified. Right diaphragm elevated. Pulmonary vasculature indistinct. Ill-defined areas increase opacity at lung bases, greater on right. BILATERAL LOWER LUNG ATELECTASIS/PNEUMONIA VERSUS EDEMA. Us Dup Abd Pel Retro Scrot Complete    Result Date: 10/5/2020  EXAMINATION: Duplex liver Doppler CLINICAL HISTORY: Abnormal liver function studies. Abnormal CT. FINDINGS: Arterial duplex of the liver performed. . The study also evaluated the liver which was extremely heterogeneous in echotexture. There is ascites. There were poorly defined areas of mixed echogenicity in the right lobe of the liver with nondescript borders. A focal mass or confluence of multiple masses within the liver suspected. In conglomeration these measured 6.5 x 8.5 cm in the central right lobe of the liver. Liver tumor suspected either metastatic or primary. Occasionally fatty liver and cirrhosis can have an appearance such as this and therefore confirmation with contrast-enhanced cross-sectional imaging (CT or MRI) recommended to confirm this, if not noted on outside studies. There was flow in the splenic vein to the level of the confluence. The portal vein at this level showed no flow. The vessel itself was small in caliber measuring 8 mm in diameter. No flow in the branches of the portal vein within the liver demonstrated. Arterial flow in the hepatic artery has elevated systolic velocity amplitude. Left hepatic artery systolic velocity is 267 cm/s. Flow in the common hepatic artery is 111 cm per sec. There is normal phasicity and waveform in the hepatic veins within the liver and in the IVC. The spleen was not evaluated. CHRONIC PORTAL VEIN THROMBOSIS. LIVER MASSES SUSPECTED CONSISTENT WITH MULTICENTRIC HEPATOCELLULAR CARCINOMA VERSUS METASTATIC DISEASE. CONFIRM WITH FURTHER CROSS-SECTIONAL IMAGING AS DISCUSSED IN THE REPORT. Xr Hip 2-3 Vw W Pelvis Left    Result Date: 10/5/2020  EXAMINATION: AP pelvis left hip. 3 films CLINICAL HISTORY: Trauma.  Posterior left hip pain FINDINGS: AP pelvis and hip and a

## 2020-10-06 ENCOUNTER — APPOINTMENT (OUTPATIENT)
Dept: ULTRASOUND IMAGING | Age: 75
DRG: 435 | End: 2020-10-06
Payer: MEDICARE

## 2020-10-06 LAB
ALBUMIN FLUID: 0.3 G/DL
ALBUMIN SERPL-MCNC: 2.3 G/DL (ref 3.5–4.6)
ALP BLD-CCNC: 400 U/L (ref 40–130)
ALT SERPL-CCNC: 54 U/L (ref 0–33)
AMMONIA: 52 UMOL/L (ref 11–51)
AMYLASE FLUID: 18 U/L
ANION GAP SERPL CALCULATED.3IONS-SCNC: 15 MEQ/L (ref 9–15)
APPEARANCE FLUID: NORMAL
AST SERPL-CCNC: 187 U/L (ref 0–35)
BASOPHILS ABSOLUTE: 0 K/UL (ref 0–0.2)
BASOPHILS RELATIVE PERCENT: 0.1 %
BILIRUB SERPL-MCNC: 0.6 MG/DL (ref 0.2–0.7)
BILIRUBIN DIRECT: 0.4 MG/DL (ref 0–0.4)
BILIRUBIN, INDIRECT: 0.2 MG/DL (ref 0–0.6)
BUN BLDV-MCNC: 47 MG/DL (ref 8–23)
CALCIUM SERPL-MCNC: 8.1 MG/DL (ref 8.5–9.9)
CELL COUNT FLUID TYPE: NORMAL
CHLORIDE BLD-SCNC: 100 MEQ/L (ref 95–107)
CLOT EVALUATION: NORMAL
CO2: 17 MEQ/L (ref 20–31)
COLOR FLUID: NORMAL
CREAT SERPL-MCNC: 2.12 MG/DL (ref 0.5–0.9)
EOSINOPHIL FLUID: 1 %
EOSINOPHILS ABSOLUTE: 0 K/UL (ref 0–0.7)
EOSINOPHILS RELATIVE PERCENT: 0 %
FLUID TYPE: NORMAL
GFR AFRICAN AMERICAN: 27.6
GFR NON-AFRICAN AMERICAN: 22.8
GLUCOSE BLD-MCNC: 104 MG/DL (ref 60–115)
GLUCOSE BLD-MCNC: 106 MG/DL (ref 60–115)
GLUCOSE BLD-MCNC: 112 MG/DL (ref 70–99)
GLUCOSE BLD-MCNC: 174 MG/DL (ref 60–115)
GLUCOSE BLD-MCNC: 288 MG/DL (ref 60–115)
GLUCOSE, FLUID: 114.5 MG/DL
GRAM STAIN RESULT: NORMAL
HBA1C MFR BLD: 10 % (ref 4.8–5.9)
HCT VFR BLD CALC: 22.5 % (ref 37–47)
HEMOGLOBIN: 7.4 G/DL (ref 12–16)
INR BLD: 1.2
LD, FLUID: 177 U/L
LYMPHOCYTES ABSOLUTE: 0.4 K/UL (ref 1–4.8)
LYMPHOCYTES RELATIVE PERCENT: 5.3 %
LYMPHOCYTES, BODY FLUID: 51 %
MACROPHAGE FLUID: 37 %
MCH RBC QN AUTO: 22.2 PG (ref 27–31.3)
MCHC RBC AUTO-ENTMCNC: 33 % (ref 33–37)
MCV RBC AUTO: 67.4 FL (ref 82–100)
MESOTHELIAL FLUID: 9 %
MONOCYTES ABSOLUTE: 0.8 K/UL (ref 0.2–0.8)
MONOCYTES RELATIVE PERCENT: 10.4 %
NEUTROPHIL, FLUID: 2 %
NEUTROPHILS ABSOLUTE: 6.5 K/UL (ref 1.4–6.5)
NEUTROPHILS RELATIVE PERCENT: 84.2 %
NUCLEATED CELLS FLUID: 78 /CUMM
NUMBER OF CELLS COUNTED FLUID: 100
PDW BLD-RTO: 20.7 % (ref 11.5–14.5)
PERFORMED ON: ABNORMAL
PERFORMED ON: ABNORMAL
PERFORMED ON: NORMAL
PERFORMED ON: NORMAL
PLATELET # BLD: 199 K/UL (ref 130–400)
POTASSIUM REFLEX MAGNESIUM: 3.9 MEQ/L (ref 3.4–4.9)
PROCALCITONIN: 3.05 NG/ML (ref 0–0.15)
PROTEIN FLUID: 0.9 G/DL
PROTHROMBIN TIME: 14.9 SEC (ref 12.3–14.9)
RBC # BLD: 3.34 M/UL (ref 4.2–5.4)
RBC FLUID: NORMAL /CUMM
SARS-COV-2, NAAT: NOT DETECTED
SODIUM BLD-SCNC: 132 MEQ/L (ref 135–144)
TOTAL PROTEIN: 6.7 G/DL (ref 6.3–8)
WBC # BLD: 7.7 K/UL (ref 4.8–10.8)

## 2020-10-06 PROCEDURE — 2500000003 HC RX 250 WO HCPCS: Performed by: RADIOLOGY

## 2020-10-06 PROCEDURE — 82140 ASSAY OF AMMONIA: CPT

## 2020-10-06 PROCEDURE — 1210000000 HC MED SURG R&B

## 2020-10-06 PROCEDURE — 87205 SMEAR GRAM STAIN: CPT

## 2020-10-06 PROCEDURE — 6370000000 HC RX 637 (ALT 250 FOR IP): Performed by: INTERNAL MEDICINE

## 2020-10-06 PROCEDURE — 85025 COMPLETE CBC W/AUTO DIFF WBC: CPT

## 2020-10-06 PROCEDURE — 84157 ASSAY OF PROTEIN OTHER: CPT

## 2020-10-06 PROCEDURE — 88112 CYTOPATH CELL ENHANCE TECH: CPT

## 2020-10-06 PROCEDURE — 87070 CULTURE OTHR SPECIMN AEROBIC: CPT

## 2020-10-06 PROCEDURE — 89051 BODY FLUID CELL COUNT: CPT

## 2020-10-06 PROCEDURE — 82150 ASSAY OF AMYLASE: CPT

## 2020-10-06 PROCEDURE — 80048 BASIC METABOLIC PNL TOTAL CA: CPT

## 2020-10-06 PROCEDURE — 80076 HEPATIC FUNCTION PANEL: CPT

## 2020-10-06 PROCEDURE — 94640 AIRWAY INHALATION TREATMENT: CPT

## 2020-10-06 PROCEDURE — 88305 TISSUE EXAM BY PATHOLOGIST: CPT

## 2020-10-06 PROCEDURE — 49083 ABD PARACENTESIS W/IMAGING: CPT | Performed by: RADIOLOGY

## 2020-10-06 PROCEDURE — 83615 LACTATE (LD) (LDH) ENZYME: CPT

## 2020-10-06 PROCEDURE — 84145 PROCALCITONIN (PCT): CPT

## 2020-10-06 PROCEDURE — U0002 COVID-19 LAB TEST NON-CDC: HCPCS

## 2020-10-06 PROCEDURE — APPNB15 APP NON BILLABLE TIME 0-15 MINS: Performed by: NURSE PRACTITIONER

## 2020-10-06 PROCEDURE — 99232 SBSQ HOSP IP/OBS MODERATE 35: CPT | Performed by: INTERNAL MEDICINE

## 2020-10-06 PROCEDURE — C1729 CATH, DRAINAGE: HCPCS

## 2020-10-06 PROCEDURE — 99222 1ST HOSP IP/OBS MODERATE 55: CPT | Performed by: RADIOLOGY

## 2020-10-06 PROCEDURE — 6360000002 HC RX W HCPCS: Performed by: INTERNAL MEDICINE

## 2020-10-06 PROCEDURE — 82042 OTHER SOURCE ALBUMIN QUAN EA: CPT

## 2020-10-06 PROCEDURE — 99222 1ST HOSP IP/OBS MODERATE 55: CPT | Performed by: INTERNAL MEDICINE

## 2020-10-06 PROCEDURE — 83036 HEMOGLOBIN GLYCOSYLATED A1C: CPT

## 2020-10-06 PROCEDURE — 94760 N-INVAS EAR/PLS OXIMETRY 1: CPT

## 2020-10-06 PROCEDURE — 94761 N-INVAS EAR/PLS OXIMETRY MLT: CPT

## 2020-10-06 PROCEDURE — 2580000003 HC RX 258: Performed by: INTERNAL MEDICINE

## 2020-10-06 PROCEDURE — 82945 GLUCOSE OTHER FLUID: CPT

## 2020-10-06 PROCEDURE — 85610 PROTHROMBIN TIME: CPT

## 2020-10-06 PROCEDURE — 36415 COLL VENOUS BLD VENIPUNCTURE: CPT

## 2020-10-06 RX ORDER — LIDOCAINE HYDROCHLORIDE 20 MG/ML
INJECTION, SOLUTION INFILTRATION; PERINEURAL
Status: COMPLETED | OUTPATIENT
Start: 2020-10-06 | End: 2020-10-06

## 2020-10-06 RX ORDER — INSULIN GLARGINE 100 [IU]/ML
10 INJECTION, SOLUTION SUBCUTANEOUS
Status: DISCONTINUED | OUTPATIENT
Start: 2020-10-07 | End: 2020-10-07

## 2020-10-06 RX ADMIN — IPRATROPIUM BROMIDE AND ALBUTEROL 1 PUFF: 20; 100 SPRAY, METERED RESPIRATORY (INHALATION) at 20:39

## 2020-10-06 RX ADMIN — LACTULOSE 20 G: 20 SOLUTION ORAL at 08:46

## 2020-10-06 RX ADMIN — AMLODIPINE BESYLATE 10 MG: 10 TABLET ORAL at 08:46

## 2020-10-06 RX ADMIN — CEFTRIAXONE SODIUM 1 G: 1 INJECTION, POWDER, FOR SOLUTION INTRAMUSCULAR; INTRAVENOUS at 01:50

## 2020-10-06 RX ADMIN — IPRATROPIUM BROMIDE AND ALBUTEROL 1 PUFF: 20; 100 SPRAY, METERED RESPIRATORY (INHALATION) at 08:52

## 2020-10-06 RX ADMIN — LACTULOSE 20 G: 20 SOLUTION ORAL at 20:46

## 2020-10-06 RX ADMIN — GUAIFENESIN 600 MG: 600 TABLET, EXTENDED RELEASE ORAL at 20:46

## 2020-10-06 RX ADMIN — HYDRALAZINE HYDROCHLORIDE 50 MG: 50 TABLET, FILM COATED ORAL at 20:46

## 2020-10-06 RX ADMIN — GUAIFENESIN 600 MG: 600 TABLET, EXTENDED RELEASE ORAL at 08:46

## 2020-10-06 RX ADMIN — LIDOCAINE HYDROCHLORIDE 10 ML: 20 INJECTION, SOLUTION INFILTRATION; PERINEURAL at 14:20

## 2020-10-06 ASSESSMENT — ENCOUNTER SYMPTOMS
NAUSEA: 0
COUGH: 0
SHORTNESS OF BREATH: 0
DIARRHEA: 0
VOMITING: 0

## 2020-10-06 NOTE — PROGRESS NOTES
Called the daughter to update her about her mom condition, phone got disconneted, called again went into voicemail

## 2020-10-06 NOTE — SEDATION DOCUMENTATION
NO SEDATION    1358 U/S tech obtained images prior to start of procedure using U/S. Pt  VSS. 1415 Procedure explained. Consent signed on floor. 1416  Timeout completed. Using U/S, Dr. Belen Mckay marked, prepped left lower abdomen with Chloraprep and draped with sterile drape and towels. 1420 Site numbed with lidocaine. [id number] Centesis 5F catheter placed using Ultrasound guidance. Fluid appears slightly turbid pink in color. Specimen fluid collected. Catheter tubing connected to [employer] machine to remove fluid. 1455 Pt tolerated well. Total 3330 ml removed. Catheter removed, pressure held and bandaid applied. Verbal and tactile reassurance given throughout. 1456 Report called to nurse. Specimens taken to lab.

## 2020-10-06 NOTE — PROGRESS NOTES
Per patients nurse, Oral Sabillon, patient is confused and she will need to contact daughter for consent. Cannot perform paracentesis until consent given. This dw Dr. Diandra Andrade and Joelle Wells. Nurse to contact me when consent is given.

## 2020-10-06 NOTE — PROGRESS NOTES
Infectious Diseases Inpatient Progress Note          HISTORY OF PRESENT ILLNESS:  Follow up CAP, lateral hydronephrosis, metastatic cholangiocarcinoma with ascites status post paracentesis on IV Rocephin and Zithromax, well tolerated. Patient is currently very sleepy. Opens eyes to verbal stimulation, was disoriented to place when awake. Current Medications:     insulin lispro  4 Units Subcutaneous TID AC    insulin lispro  0-6 Units Subcutaneous TID WC    insulin lispro  0-3 Units Subcutaneous Nightly    [START ON 10/7/2020] insulin glargine  10 Units Subcutaneous Daily before lunch    enoxaparin  30 mg Subcutaneous Daily    guaiFENesin  600 mg Oral BID    cefTRIAXone (ROCEPHIN) IV  1 g Intravenous Q24H    azithromycin  250 mg Intravenous Q24H    amLODIPine  10 mg Oral Daily    aspirin  81 mg Oral Daily    hydrALAZINE  50 mg Oral 3 times per day    albuterol-ipratropium  1 puff Inhalation TID    lactulose  20 g Oral BID       Allergies:  Patient has no known allergies. Review of Systems  unable to provide ROS because of obtundation and confusion      Physical Exam  Vitals:    10/06/20 0749 10/06/20 0854 10/06/20 1403 10/06/20 1457   BP: (!) 142/68  (!) 164/71 136/67   Pulse: 90  96 85   Resp: 18 18 16 16   Temp: 97.3 °F (36.3 °C)      TempSrc: Oral      SpO2: 100% 100% 98% 99%   Weight:       Height:         General Appearance: Lethargic, in no acute distress, on room air  Opens eyes briefly to verbal stimulation, disoriented to place  Skin: warm and dry, no rash. Head: normocephalic and atraumatic  Eyes: anicteric sclerae  ENT: oropharynx clear and moist with normal mucous membranes.  No oral thrush  Lungs: normal respiratory effort, reduced breath sounds bilateral bases, rhonchi or wheezing  Heart regular rhythm, murmur  Abdomen: soft, no tenderness, decreased distention status post paracentesis  +2 B leg edema  No erythema, no tenderness      DATA:    Lab Results   Component Value Date WBC 7.7 10/06/2020    HGB 7.4 (L) 10/06/2020    HCT 22.5 (L) 10/06/2020    MCV 67.4 (L) 10/06/2020     10/06/2020     Lab Results   Component Value Date    CREATININE 2.12 (H) 10/06/2020    BUN 47 (H) 10/06/2020     (L) 10/06/2020    K 3.9 10/06/2020     10/06/2020    CO2 17 (L) 10/06/2020       Hepatic Function Panel:  Lab Results   Component Value Date    ALKPHOS 400 10/06/2020    ALT 54 10/06/2020     10/06/2020    PROT 6.7 10/06/2020    BILITOT 0.6 10/06/2020    BILIDIR 0.4 10/06/2020    IBILI 0.2 10/06/2020    LABALBU 2.3 10/06/2020    LABALBU 2.7 07/13/2020       Microbiology:   Recent Labs     10/05/20  0139   BC No Growth to date. Any change in status will be called. Recent Labs     10/05/20  0139   BLOODCULT2 No Growth to date. Any change in status will be called.          IMPRESSION:    · community-acquired pneumonia  · Bilateral hydronephrosis  · cholangiocarcinoma with ascites and liver lesions  · High procalcitonin level makes it less likely to be a viral infection    Patient Active Problem List   Diagnosis    Erythropoietin deficiency anemia    Chronic kidney disease, stage III (moderate)    Weakness       PLAN:  · Continue IV Rocephin  · DC Zithromax  · Check ascitic analysis  · Follow-up with heme-onc and GI    Discussed with patient and spouse    Daniel Simmons MD

## 2020-10-06 NOTE — PROGRESS NOTES
Progress Note  Date:10/6/2020       GKEO:[id number]  Patient Name:Jaye Lynn     YOB: 1945     Age:75 y.o. Pt was seen and evaluated, spoke to pt daughters about the care. Subjective    Subjective:  Symptoms:  She reports malaise and weakness. No shortness of breath, cough, chest pain, headache, chest pressure, anorexia, diarrhea or anxiety. Diet:  No nausea or vomiting. Review of Systems   Respiratory: Negative for cough and shortness of breath. Cardiovascular: Negative for chest pain. Gastrointestinal: Negative for anorexia, diarrhea, nausea and vomiting. Neurological: Positive for weakness. Objective         Vitals Last 24 Hours:  TEMPERATURE:  Temp  Av.3 °F (36.3 °C)  Min: 97.2 °F (36.2 °C)  Max: 97.3 °F (36.3 °C)  RESPIRATIONS RANGE: Resp  Av  Min: 16  Max: 20  PULSE OXIMETRY RANGE: SpO2  Av.8 %  Min: 99 %  Max: 100 %  PULSE RANGE: Pulse  Av.8  Min: 78  Max: 90  BLOOD PRESSURE RANGE: Systolic (58KPZ), BAD:708 , Min:110 , QZB:631   ; Diastolic (43XCT), DSD:56, Min:46, Max:68    I/O (24Hr): Intake/Output Summary (Last 24 hours) at 10/6/2020 1233  Last data filed at 10/5/2020 2148  Gross per 24 hour   Intake 220 ml   Output 900 ml   Net -680 ml     Objective:  General Appearance:  Ill-appearing. Vital signs: (most recent): Blood pressure (!) 142/68, pulse 90, temperature 97.3 °F (36.3 °C), temperature source Oral, resp. rate 18, height 5' 3\" (1.6 m), weight 143 lb (64.9 kg), SpO2 100 %. HEENT: Normal HEENT exam.    Lungs:  Normal effort and normal respiratory rate. Abdomen: Abdomen is distended. There is generalized tenderness. Pulses: Distal pulses are intact. Neurological: Patient is alert. Pupils:  Pupils are equal, round, and reactive to light. Skin:  Warm and dry.       Labs/Imaging/Diagnostics    Labs:  CBC:  Recent Labs     10/04/20  2245 10/04/20  2342 10/06/20  0515   WBC 7.0  --  7.7   RBC 3.65*  --  3.34*   HGB 8. 0* 9.0* 7.4*   HCT 24.5*  --  22.5*   MCV 67.1*  --  67.4*   RDW 20.7*  --  20.7*     --  199     CHEMISTRIES:  Recent Labs     10/04/20  2245 10/04/20  2342 10/05/20  0525 10/06/20  0515   *  --  132* 132*   K 4.6  --  3.9 3.9   CL 98  --  101 100   CO2 21  --  19* 17*   BUN 37*  --  33* 47*   CREATININE 1.60* 1.7* 1.47* 2.12*   GLUCOSE 261*  --  203* 112*   PHOS  --   --  3.1  --    MG 1.9  --   --   --      PT/INR:  Recent Labs     10/06/20  0515   PROTIME 14.9   INR 1.2     APTT:No results for input(s): APTT in the last 72 hours. LIVER PROFILE:  Recent Labs     10/04/20  2245 10/05/20  0525 10/06/20  0515   AST 91* 73* 187*   ALT 30 29 54*   BILIDIR  --  0.5* 0.4   BILITOT 0.7 0.7 0.6   ALKPHOS 430* 405* 400*       Imaging Last 24 Hours:  Ct Abdomen Pelvis Wo Contrast Additional Contrast? None    Result Date: 10/5/2020  CT of the Abdomen and Pelvis without intravenous contrast medium History:  Diffuse abdominal pain. Recent fall. Abdominal distention. History renal disease. Technical Factors: CT imaging of the abdomen and pelvis were obtained and formatted as 5 mm contiguous axial images from the domes of the diaphragm to the symphysis pubis. Sagittal and coronal reconstructions were also obtained. Oral contrast medium:  None. Intravenous contrast medium:  None. Comparison:  None. Findings: Study limited secondary to lack of intravenous and oral contrast media. Lungs:  Small bilateral pleural effusions with dependent reticular and subsegmental atelectatic bilaterally, greater on right. Liver:  Normal in size, shape, and attenuation. Small in size and nodular in contour. Multiple ill-defined and rounded areas of decreased attenuation throughout the liver. Largest area affected is within the right hepatic lobe with low attenuation foci measuring up to 4 x 6 cm. Bile Ducts:  Normal in caliber. Gallbladder:  No stones or wall thickening.  Pancreas:  Normal without masses, cysts, ductal dilatation or calcification. Spleen:  Normal in size without masses or calcifications. No splenules. Kidneys:  Normal in size. Moderate right, with mild to moderate left pelvocaliectasis. Renal vascular calcification identified bilaterally with possible superimposed bilateral renal calculi measuring up to 2 mm. 2 cm cyst lateral midpole left kidney. Adrenals:  Normal. Small bowel:  Normal in caliber. Not well visualized secondary to lack of opacification. Appendix:  Not visualized. Colon:  Normal in caliber. Peritoneum:  No free air. Free fluid is identified within the right and anterior perihepatic spaces, subphrenic and perisplenic spaces, mesentery, Morison's pouch and bilateral paracolic gutters, and continuing to extending caudally into the pelvis. Vessels: Aorta normal in course and caliber. Probable bilateral femoral-popliteal bypass grafts. Lymph nodes:  Retroperitoneal:  No enlarged retroperitoneal lymph nodes. Mesenteric:  No enlarged mesenteric lymph nodes. Pelvic: No enlarged pelvic lymph nodes. Ureters: Not well visualized. Bladder: No wall thickening. Reproductive organs: No pelvic masses. Abdominal Wall:  No hernia identified  Diffuse edematous change of the abdominal and pelvic sidewalls. Bones:  No bone lesions. Diffuse disc space narrowing, T6-7 through T11-12 disc spaces, greatest at T7-8 through T9-10 disc spaces. No post operative changes. Multiple ill-defined and rounded low attenuation foci throughout the right and left hepatic lobes. Malignancy diagnosis of exclusion. Cirrhosis. Anasarca, with large volume ascites. Moderate right and mild to moderate left hydronephrosis with bilateral renal vascular calcification, and possible superimposed bilateral renal calculi measuring up to 2 mm. No etiology of obstruction identified on this noncontrast enhanced study.  All CT scans at this facility use dose modulation, iterative reconstruction, and/or weight based dosing when appropriate to reduce radiation dose to as low as reasonably achievable. Ct Head Wo Contrast    Result Date: 10/5/2020  EXAMINATION: CT HEAD WO CONTRAST  DATE AND TIME:10/5/2020 7:36 AM CLINICAL HISTORY: Severe headache.  metabolic encephalopathy  COMPARISON: None TECHNIQUE:Axial CT from skull base to vertex without  contrast..  All CT scans at this facility use dose modulation, iterative reconstruction, and/or weight based dosing when appropriate to reduce radiation dose to as low as reasonably achievable. FINDINGS CSF spaces: Ventricles are normal in size and position. Sulci are appropriate for age. Brain parenchyma: No  parenchymal mass,  mass effect,  signs of acute infarct, or extra-axial fluid. There are mild patchy nonspecific white matter hypodensities that may be related to microvascular ischemic change or  normal aging. Hemorrhage:No acute intracranial hemorrhage. Skull base: The bony calvarium and skull base are normal.   The visualized paranasal sinuses and mastoids are clear. NO ACUTE INTRACRANIAL PATHOLOGY. Xr Chest Portable    Result Date: 10/5/2020  EXAMINATION: XR CHEST PORTABLE CLINICAL HISTORY: COUGH, SHORTNESS OF BREATH, ALTERED MENTAL STATUS COMPARISONS: None available. FINDINGS: Osseous structures intact. Cardiopericardial silhouette normal. Aorta calcified. Right diaphragm elevated. Pulmonary vasculature indistinct. Ill-defined areas increase opacity at lung bases, greater on right. BILATERAL LOWER LUNG ATELECTASIS/PNEUMONIA VERSUS EDEMA. Us Dup Abd Pel Retro Scrot Complete    Result Date: 10/5/2020  EXAMINATION: Duplex liver Doppler CLINICAL HISTORY: Abnormal liver function studies. Abnormal CT. FINDINGS: Arterial duplex of the liver performed. . The study also evaluated the liver which was extremely heterogeneous in echotexture. There is ascites.  There were poorly defined areas of mixed echogenicity in the right lobe of the liver with nondescript

## 2020-10-06 NOTE — PLAN OF CARE
Problem: Skin Integrity:  Goal: Will show no infection signs and symptoms  Description: Will show no infection signs and symptoms  Outcome: Ongoing  Goal: Absence of new skin breakdown  Description: Absence of new skin breakdown  Outcome: Ongoing     Problem: Falls - Risk of:  Goal: Will remain free from falls  Description: Will remain free from falls  Outcome: Ongoing  Goal: Absence of physical injury  Description: Absence of physical injury  Outcome: Ongoing     Problem: Confusion - Acute:  Goal: Absence of continued neurological deterioration signs and symptoms  Description: Absence of continued neurological deterioration signs and symptoms  Outcome: Ongoing  Goal: Mental status will be restored to baseline  Description: Mental status will be restored to baseline  Outcome: Ongoing     Problem: Injury - Risk of, Physical Injury:  Goal: Will remain free from falls  Description: Will remain free from falls  Outcome: Ongoing  Goal: Absence of physical injury  Description: Absence of physical injury  Outcome: Ongoing     Problem: Sleep Pattern Disturbance:  Goal: Appears well-rested  Description: Appears well-rested  Outcome: Ongoing     Problem: Mobility - Impaired:  Goal: Mobility will improve  Description: Mobility will improve  Outcome: Ongoing

## 2020-10-06 NOTE — CARE COORDINATION
This Care Transition Nurse provided pneumonia booklet and zones sheet and reviewed. Discussed the use of zones sheet. Stressed importance of phoning physician promptly for symptoms in the yellow zone and ems for symptoms in the red zone. Discussed importance of taking antibiotics until gone, drinking plenty of fluids ,especially water, coughing and deep breathing, continue to use incentive spirometer and acapella at home,  get adequate rest and eat a well balanced diet, good handwashing, Wearing a mask when gong out, avoid ill contacts, disposing of used tissues in the proper receptacle, avoid drinking alcoholic beverages, vaccines. Stressed importance of physician follow up within one week of discharge. Patient voiced understanding. Patient states she does not smoke or drink. She is not exposed to second hand smoke. She states she probably does not drink enough fluids. She like to eat ice. Advised to drink 6-8 eight oz glasses of fluids daily, mostly water. She does not take vaccines due to personal reasons. Advised to discuss with her physician.

## 2020-10-07 LAB
ALBUMIN SERPL-MCNC: 1.9 G/DL (ref 3.5–4.6)
ALP BLD-CCNC: 396 U/L (ref 40–130)
ALT SERPL-CCNC: 70 U/L (ref 0–33)
AMMONIA: 34 UMOL/L (ref 11–51)
ANION GAP SERPL CALCULATED.3IONS-SCNC: 13 MEQ/L (ref 9–15)
ANISOCYTOSIS: ABNORMAL
AST SERPL-CCNC: 181 U/L (ref 0–35)
BASOPHILS ABSOLUTE: 0 K/UL (ref 0–0.2)
BASOPHILS RELATIVE PERCENT: 0.1 %
BILIRUB SERPL-MCNC: 0.6 MG/DL (ref 0.2–0.7)
BILIRUBIN DIRECT: 0.4 MG/DL (ref 0–0.4)
BILIRUBIN, INDIRECT: 0.2 MG/DL (ref 0–0.6)
BUN BLDV-MCNC: 55 MG/DL (ref 8–23)
CALCIUM SERPL-MCNC: 7.8 MG/DL (ref 8.5–9.9)
CHLORIDE BLD-SCNC: 101 MEQ/L (ref 95–107)
CO2: 19 MEQ/L (ref 20–31)
CREAT SERPL-MCNC: 2.27 MG/DL (ref 0.5–0.9)
EOSINOPHILS ABSOLUTE: 0 K/UL (ref 0–0.7)
EOSINOPHILS RELATIVE PERCENT: 0 %
FERRITIN: 1100 NG/ML (ref 13–150)
GFR AFRICAN AMERICAN: 25.5
GFR NON-AFRICAN AMERICAN: 21.1
GLUCOSE BLD-MCNC: 252 MG/DL (ref 70–99)
GLUCOSE BLD-MCNC: 265 MG/DL (ref 60–115)
GLUCOSE BLD-MCNC: 281 MG/DL (ref 60–115)
GLUCOSE BLD-MCNC: 312 MG/DL (ref 60–115)
GLUCOSE BLD-MCNC: 349 MG/DL (ref 60–115)
HCT VFR BLD CALC: 21.7 % (ref 37–47)
HEMOGLOBIN: 6.9 G/DL (ref 12–16)
HYPOCHROMIA: ABNORMAL
INR BLD: 1.3
IRON SATURATION: 23 % (ref 11–46)
IRON: 26 UG/DL (ref 37–145)
LYMPHOCYTES ABSOLUTE: 0.1 K/UL (ref 1–4.8)
LYMPHOCYTES RELATIVE PERCENT: 1 %
MACROCYTES: ABNORMAL
MCH RBC QN AUTO: 21.6 PG (ref 27–31.3)
MCHC RBC AUTO-ENTMCNC: 31.8 % (ref 33–37)
MCV RBC AUTO: 67.9 FL (ref 82–100)
MICROCYTES: ABNORMAL
MONOCYTES ABSOLUTE: 0.7 K/UL (ref 0.2–0.8)
MONOCYTES RELATIVE PERCENT: 6.7 %
NEUTROPHILS ABSOLUTE: 8.6 K/UL (ref 1.4–6.5)
NEUTROPHILS RELATIVE PERCENT: 92 %
NUCLEATED RED BLOOD CELLS: 1 /100 WBC
PDW BLD-RTO: 21.3 % (ref 11.5–14.5)
PERFORMED ON: ABNORMAL
PLATELET # BLD: 196 K/UL (ref 130–400)
PLATELET SLIDE REVIEW: NORMAL
POTASSIUM REFLEX MAGNESIUM: 4.4 MEQ/L (ref 3.4–4.9)
PROTHROMBIN TIME: 16 SEC (ref 12.3–14.9)
RBC # BLD: 3.2 M/UL (ref 4.2–5.4)
SODIUM BLD-SCNC: 133 MEQ/L (ref 135–144)
TARGET CELLS: ABNORMAL
TOTAL IRON BINDING CAPACITY: 111 UG/DL (ref 178–450)
TOTAL PROTEIN: 6.1 G/DL (ref 6.3–8)
WBC # BLD: 9.3 K/UL (ref 4.8–10.8)

## 2020-10-07 PROCEDURE — 80048 BASIC METABOLIC PNL TOTAL CA: CPT

## 2020-10-07 PROCEDURE — 99232 SBSQ HOSP IP/OBS MODERATE 35: CPT | Performed by: RADIOLOGY

## 2020-10-07 PROCEDURE — 94640 AIRWAY INHALATION TREATMENT: CPT

## 2020-10-07 PROCEDURE — 99222 1ST HOSP IP/OBS MODERATE 55: CPT | Performed by: NURSE PRACTITIONER

## 2020-10-07 PROCEDURE — 6370000000 HC RX 637 (ALT 250 FOR IP): Performed by: INTERNAL MEDICINE

## 2020-10-07 PROCEDURE — 82728 ASSAY OF FERRITIN: CPT

## 2020-10-07 PROCEDURE — 80076 HEPATIC FUNCTION PANEL: CPT

## 2020-10-07 PROCEDURE — 1210000000 HC MED SURG R&B

## 2020-10-07 PROCEDURE — 85025 COMPLETE CBC W/AUTO DIFF WBC: CPT

## 2020-10-07 PROCEDURE — 2580000003 HC RX 258: Performed by: INTERNAL MEDICINE

## 2020-10-07 PROCEDURE — 97162 PT EVAL MOD COMPLEX 30 MIN: CPT

## 2020-10-07 PROCEDURE — 83550 IRON BINDING TEST: CPT

## 2020-10-07 PROCEDURE — 99232 SBSQ HOSP IP/OBS MODERATE 35: CPT | Performed by: INTERNAL MEDICINE

## 2020-10-07 PROCEDURE — 36415 COLL VENOUS BLD VENIPUNCTURE: CPT

## 2020-10-07 PROCEDURE — 83540 ASSAY OF IRON: CPT

## 2020-10-07 PROCEDURE — 6360000002 HC RX W HCPCS: Performed by: INTERNAL MEDICINE

## 2020-10-07 PROCEDURE — 82140 ASSAY OF AMMONIA: CPT

## 2020-10-07 PROCEDURE — 94761 N-INVAS EAR/PLS OXIMETRY MLT: CPT

## 2020-10-07 PROCEDURE — 85610 PROTHROMBIN TIME: CPT

## 2020-10-07 RX ORDER — INSULIN GLARGINE 100 [IU]/ML
20 INJECTION, SOLUTION SUBCUTANEOUS
Status: DISCONTINUED | OUTPATIENT
Start: 2020-10-08 | End: 2020-10-07

## 2020-10-07 RX ORDER — TORSEMIDE 20 MG/1
40 TABLET ORAL DAILY
Status: DISCONTINUED | OUTPATIENT
Start: 2020-10-07 | End: 2020-10-09 | Stop reason: HOSPADM

## 2020-10-07 RX ORDER — INSULIN GLARGINE 100 [IU]/ML
30 INJECTION, SOLUTION SUBCUTANEOUS
Status: DISCONTINUED | OUTPATIENT
Start: 2020-10-08 | End: 2020-10-08

## 2020-10-07 RX ADMIN — INSULIN GLARGINE 10 UNITS: 100 INJECTION, SOLUTION SUBCUTANEOUS at 12:31

## 2020-10-07 RX ADMIN — IPRATROPIUM BROMIDE AND ALBUTEROL 1 PUFF: 20; 100 SPRAY, METERED RESPIRATORY (INHALATION) at 20:49

## 2020-10-07 RX ADMIN — GUAIFENESIN 600 MG: 600 TABLET, EXTENDED RELEASE ORAL at 09:04

## 2020-10-07 RX ADMIN — IPRATROPIUM BROMIDE AND ALBUTEROL 1 PUFF: 20; 100 SPRAY, METERED RESPIRATORY (INHALATION) at 07:30

## 2020-10-07 RX ADMIN — AMLODIPINE BESYLATE 10 MG: 10 TABLET ORAL at 09:05

## 2020-10-07 RX ADMIN — ENOXAPARIN SODIUM 30 MG: 30 INJECTION SUBCUTANEOUS at 09:05

## 2020-10-07 RX ADMIN — CEFTRIAXONE SODIUM 1 G: 1 INJECTION, POWDER, FOR SOLUTION INTRAMUSCULAR; INTRAVENOUS at 02:37

## 2020-10-07 RX ADMIN — TORSEMIDE 40 MG: 20 TABLET ORAL at 16:45

## 2020-10-07 RX ADMIN — ASPIRIN 81 MG: 81 TABLET, CHEWABLE ORAL at 09:05

## 2020-10-07 RX ADMIN — HYDRALAZINE HYDROCHLORIDE 50 MG: 50 TABLET, FILM COATED ORAL at 20:33

## 2020-10-07 RX ADMIN — IPRATROPIUM BROMIDE AND ALBUTEROL 1 PUFF: 20; 100 SPRAY, METERED RESPIRATORY (INHALATION) at 13:29

## 2020-10-07 RX ADMIN — HYDRALAZINE HYDROCHLORIDE 50 MG: 50 TABLET, FILM COATED ORAL at 16:45

## 2020-10-07 RX ADMIN — HYDRALAZINE HYDROCHLORIDE 50 MG: 50 TABLET, FILM COATED ORAL at 05:25

## 2020-10-07 RX ADMIN — LACTULOSE 20 G: 20 SOLUTION ORAL at 09:05

## 2020-10-07 RX ADMIN — GUAIFENESIN 600 MG: 600 TABLET, EXTENDED RELEASE ORAL at 20:33

## 2020-10-07 RX ADMIN — LACTULOSE 20 G: 20 SOLUTION ORAL at 20:33

## 2020-10-07 ASSESSMENT — ENCOUNTER SYMPTOMS
COUGH: 0
NAUSEA: 0
SHORTNESS OF BREATH: 1
VOMITING: 0
TROUBLE SWALLOWING: 0
CHEST TIGHTNESS: 0
DIARRHEA: 0
SORE THROAT: 0
CONSTIPATION: 0
SINUS PAIN: 0
SHORTNESS OF BREATH: 0
WHEEZING: 0
BACK PAIN: 0
ABDOMINAL PAIN: 0
ABDOMINAL DISTENTION: 1

## 2020-10-07 ASSESSMENT — PAIN SCALES - GENERAL: PAINLEVEL_OUTOF10: 0

## 2020-10-07 NOTE — CONSULTS
Palliative Care Consult Note  Patient: Marco Benitez  Gender: female  YOB: 1945  Unit/Bed: X672/H027-82  CodeStatus: Full Code  Inpatient Treatment Team: Treatment Team: Attending Provider: Villa Lanza MD; Consulting Physician: Danitza Caban MD; Consulting Physician: SANTHOSH Elias; Utilization Reviewer: Sayda Win RN; Consulting Physician: Marsha Stanley MD; Consulting Physician: Pete Amaya MD; Consulting Physician: Darryle Plenty, MD; Consulting Physician: Glo Brito MD; Consulting Physician: Mary Porter MD; Patient Care Tech: Fallon Plate; Registered Nurse: Tereza Bowling RN; Patient Care Tech: [place]; Registered Nurse: Maria Eugenia Stephens RN; Patient Care Tech: Gopi Helms; : Quynh Brooks RN  Admit Date:  10/4/2020    Chief Complaint: fall, weakness    History of Presenting Illness:      Marco Benitez is a 70 y.o. female on hospital day 2 with a history of : cholangiocarcinoma(2020), DM2, HTN, JONNATHAN, depression. Patient presented to the E.D.on 10/3 after she had  hyperglycemia in the 400's at home x2 days. She has also had a fall the day before and landed on her left gluteus region. She had some discomfort for a few days but none today. She was found to have vaginal prolapse in the E.D. CXR was done which shows RLL pneumonia. She was started on IV Rocephin. Patient was recently diagnosed with cholangiocarcinoma. She was diagnosed at [facility] and is following with [facility] oncology. 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 Gi was consulted for ascites. Consulted oncology-tap ascitic fluid. Results pending. Patient presented today alert and oriented x3 with slight confusion and in NAD. She denies any pain.  at bedside. Patient denies any pain. Her current lab results Hgb-6.9, Hct-21.7. She denies any increase in weakness, SOB, cough or fatigue. BUN 55, creatinine 2.27. albumin level 1. 9. Blood cultures were negative for growth. She is resting comfortable. Denies any N/V/D or constipation. BLE edema. Abdomen distended. Was able to walk prior hospitalization. positive for weakness. Discussed with patient and  goals of care. Patient would like to pursue aggressive treatment. She would like to continue to be Full Code. Per  he is the POA and has papers at home. Advise patient to bring in copy of POA papers. In agreement to be followed by palliative care as outpatient. No follow up required as inpatient. Review of Systems:       Review of Systems   Constitutional: Positive for appetite change. Negative for chills, fatigue, fever and unexpected weight change. HENT: Negative for congestion, mouth sores, sinus pain, sore throat and trouble swallowing. Respiratory: Negative for cough, chest tightness, shortness of breath and wheezing. Cardiovascular: Positive for leg swelling. Negative for chest pain and palpitations. Gastrointestinal: Positive for abdominal distention. Negative for abdominal pain, constipation, diarrhea, nausea and vomiting. Endocrine: Negative for polydipsia, polyphagia and polyuria. Genitourinary: Negative for dysuria, flank pain, frequency and urgency. Musculoskeletal: Negative for arthralgias, back pain, gait problem, joint swelling and myalgias. Skin: Negative. Neurological: Positive for weakness. Negative for tremors, seizures, speech difficulty, numbness and headaches. Psychiatric/Behavioral: Negative for agitation, confusion, sleep disturbance and suicidal ideas. The patient is not nervous/anxious. Physical Examination:       BP (!) 122/56   Pulse 113   Temp 100 °F (37.8 °C) (Oral)   Resp 16   Ht 5' 3\" (1.6 m)   Wt 143 lb (64.9 kg)   SpO2 95%   BMI 25.33 kg/m²    Physical Exam  Constitutional:       Appearance: She is well-developed. HENT:      Head: Normocephalic and atraumatic.    Eyes:      Pupils: Pupils are equal, round, and reactive to light.   Neck:      Musculoskeletal: Normal range of motion and neck supple. Cardiovascular:      Rate and Rhythm: Normal rate and regular rhythm. Heart sounds: No murmur. No friction rub. No gallop. Pulmonary:      Effort: Pulmonary effort is normal.      Breath sounds: Normal breath sounds. No wheezing or rales. Chest:      Chest wall: No tenderness. Abdominal:      General: Bowel sounds are normal. There is distension. Palpations: Abdomen is soft. Tenderness: There is no abdominal tenderness. There is no guarding. Musculoskeletal: Normal range of motion. General: No tenderness or deformity. Right lower leg: Edema present. Left lower leg: Edema present. Skin:     General: Skin is warm and dry. Findings: No erythema or rash. Neurological:      Mental Status: She is alert and oriented to person, place, and time.          Allergies:      No Known Allergies    Medications:      Current Facility-Administered Medications   Medication Dose Route Frequency Provider Last Rate Last Dose    insulin lispro (HUMALOG) injection vial 4 Units  4 Units Subcutaneous TID AC Dwaine Dias MD   4 Units at 10/07/20 0906    insulin lispro (HUMALOG) injection vial 0-6 Units  0-6 Units Subcutaneous TID WC Dwaine Dias MD   3 Units at 10/07/20 0906    insulin lispro (HUMALOG) injection vial 0-3 Units  0-3 Units Subcutaneous Nightly Dwaine Dias MD   2 Units at 10/06/20 2047    insulin glargine (LANTUS) injection vial 10 Units  10 Units Subcutaneous Daily before lunch Dwaine Dias MD        polyethylene glycol (GLYCOLAX) packet 17 g  17 g Oral Daily PRN Familia Marcanoar, DO        promethazine (PHENERGAN) tablet 12.5 mg  12.5 mg Oral Q6H PRN Familia Marcanoar, DO        Or    ondansetron (ZOFRAN) injection 4 mg  4 mg Intravenous Q6H PRN Familia Kumari Sedar, DO        enoxaparin (LOVENOX) injection 30 mg  30 mg Subcutaneous Daily Rudy Mobley DO   30 mg at 10/07/20 0905    labetalol History    None   Social Needs    Financial resource strain: None    Food insecurity     Worry: None     Inability: None    Transportation needs     Medical: None     Non-medical: None   Tobacco Use    Smoking status: Never Smoker   Substance and Sexual Activity    Alcohol use: No    Drug use: No    Sexual activity: Yes     Partners: Male   Lifestyle    Physical activity     Days per week: None     Minutes per session: None    Stress: None   Relationships    Social connections     Talks on phone: None     Gets together: None     Attends Evangelical service: None     Active member of club or organization: None     Attends meetings of clubs or organizations: None     Relationship status: None    Intimate partner violence     Fear of current or ex partner: None     Emotionally abused: None     Physically abused: None     Forced sexual activity: None   Other Topics Concern    None   Social History Narrative    None       Family Hx:  Family History   Problem Relation Age of Onset    Cancer Mother         stomach    Cancer Father         throat    Arthritis Neg Hx     Asthma Neg Hx     Birth Defects Neg Hx     Depression Neg Hx     Diabetes Neg Hx     Early Death Neg Hx     Hearing Loss Neg Hx     Heart Disease Neg Hx     High Blood Pressure Neg Hx     High Cholesterol Neg Hx     Kidney Disease Neg Hx     Learning Disabilities Neg Hx     Mental Illness Neg Hx     Mental Retardation Neg Hx     Miscarriages / Stillbirths Neg Hx     Stroke Neg Hx     Substance Abuse Neg Hx     Vision Loss Neg Hx     Other Neg Hx        LABS: Reviewed     CBC:  Lab Results   Component Value Date    WBC 9.3 10/07/2020    RBC 3.20 10/07/2020    HGB 6.9 10/07/2020    HCT 21.7 10/07/2020    MCV 67.9 10/07/2020    MCH 21.6 10/07/2020    MCHC 31.8 10/07/2020    RDW 21.3 10/07/2020     10/07/2020     CBC with Differential:   Lab Results   Component Value Date    WBC 9.3 10/07/2020    RBC 3.20 10/07/2020    HGB 6.9 10/07/2020    HCT 21.7 10/07/2020     10/07/2020    MCV 67.9 10/07/2020    MCH 21.6 10/07/2020    MCHC 31.8 10/07/2020    RDW 21.3 10/07/2020    NRBC 1 10/07/2020    BANDSPCT 1 09/24/2020    LYMPHOPCT 1.0 10/07/2020    MONOPCT 6.7 10/07/2020    BASOPCT 0.1 10/07/2020    MONOSABS 0.7 10/07/2020    LYMPHSABS 0.1 10/07/2020    EOSABS 0.0 10/07/2020    BASOSABS 0.0 10/07/2020     CMP:    Lab Results   Component Value Date     10/07/2020    K 4.4 10/07/2020     10/07/2020    CO2 19 10/07/2020    BUN 55 10/07/2020    CREATININE 2.27 10/07/2020    GFRAA 25.5 10/07/2020    LABGLOM 21.1 10/07/2020    GLUCOSE 252 10/07/2020    GLUCOSE 576 07/13/2020    PROT 6.1 10/07/2020    LABALBU 1.9 10/07/2020    LABALBU 2.7 07/13/2020    CALCIUM 7.8 10/07/2020    BILITOT 0.6 10/07/2020    ALKPHOS 396 10/07/2020     10/07/2020    ALT 70 10/07/2020     BMP:    Lab Results   Component Value Date     10/07/2020    K 4.4 10/07/2020     10/07/2020    CO2 19 10/07/2020    BUN 55 10/07/2020    LABALBU 1.9 10/07/2020    LABALBU 2.7 07/13/2020    CREATININE 2.27 10/07/2020    CALCIUM 7.8 10/07/2020    GFRAA 25.5 10/07/2020    LABGLOM 21.1 10/07/2020    GLUCOSE 252 10/07/2020    GLUCOSE 576 07/13/2020     TSH: No results found for: TSH  Vitamin B12 and Folate: No components found for: FOLIC,  Z95  Urinalysis:   Lab Results   Component Value Date    NITRU Negative 10/04/2020    WBCUA 0-2 10/04/2020    WBCUA 2 07/13/2020    BACTERIA Negative 10/04/2020    RBCUA 20-50 10/04/2020    RBCUA 9 07/13/2020    BLOODU MODERATE 10/04/2020    SPECGRAV 1.012 10/04/2020    GLUCOSEU Negative 10/04/2020           FUNCTIONAL ADL´S:     Independent: [x  ] Eating, [   ] Dressing, [   ] Transferring, [   ] Toileting, [   ] Bathing, [ x  ] Continence  Dependent   : [  ] Eating, [   ] Dressing, [   ] Transferring, [   ] Toileting, [   ] Bathing, [   ] Continence  W. assistant : [  ] Eating, [  x ] Dressing, [x   ] Transferring, [

## 2020-10-07 NOTE — CONSULTS
[address]                      [address], [address] [facility]                                  CONSULTATION    PATIENT NAME: Nina Mendez                    :        1945  MED REC NO:   [id number]                            ROOM:       W274  ACCOUNT NO:   [de-identified]                           ADMIT DATE: 10/04/2020  PROVIDER:     Wojciech Benedict MD    CONSULT DATE:  10/06/2020    REFERRING PROVIDER:  Dr. Ortega Vargas. REASON FOR CONSULTATION:  Management of uncontrolled diabetes. CHIEF COMPLAINT AND HISTORY OF PRESENT ILLNESS:  The patient is a  77-year-old female with known history of cholangiocarcinoma, admitted  with hyperglycemia and fall. The patient also complained of changes in  the mental status who was followed up by [facility] for  cholangiocarcinoma with right lower pneumonia and was started on IV  Levaquin. Blood sugars were initially in the 400 range, later it was in  the 260. The patient has been seeing Dr. Tung Chen, endocrinologist at  [facility]. The patient's blood sugars here have been fluctuating between  low of 106 at lunchtime to high of 288, was put on Lantus. Home insulin  regimen of 10 units with each meals, Humalog and Lantus 12 units at  bedtime. Complication of diabetes include neuropathy and chronic kidney  disease. Labs here show sodium 132, potassium 3.9, chloride was 100, CO2 was 17,  BUN was 47, creatinine was 2.12. Her A1c was at 10. PAST MEDICAL HISTORY:  Significant for hypertension, diabetes,  cholangiocarcinoma. PAST SURGICAL HISTORY:  Paracenteses. FAMILY HISTORY:  Cancers. PERSONAL AND SOCIAL HISTORY:  Denies any smoking. Denies any alcohol.     MEDICATIONS:  The patient's medications here include Lantus at night,  Humalog 10 with each meals plus medium dose sliding scale coverage,  Combivent inhaler, Norvasc, Zithromax 250 mg every 24 hours, Rocephin 1  g every 24 hours, Lovenox, hydralazine, _____. ALLERGIES:  None. REVIEW OF SYSTEMS:  Other than changes in mental status, hyperglycemia,  abdominal and lower leg edema, weakness, 14-point review of system was  negative. PHYSICAL EXAMINATION:  GENERAL:  The patient appears very ill, lying in bed. Alert, awake. VITAL SIGNS:  Blood pressure 136/69, pulse rate was 85, respiratory rate  18, temperature 97.5. HEENT:  Normocephalic, atraumatic. Pupils equal, reactive to light. No  jaundice. Oral mucosa was dry. NECK:  Supple. Trachea in midline. CHEST:  Lungs were clear to auscultation bilaterally. No wheezing or  crackles were heard. CARDIOVASCULAR:  Heart sounds were normal.  No murmurs or thrills were  present. ABDOMEN:  Distended. Bowel sounds were diminished. Tenderness to  palpation of right upper quadrant and epigastric area was noted. EXTREMITIES:  Lower extremities reveal 2+ pitting edema both lower legs. SKIN:  Shows some chronic changes, no breakdown. MUSCULOSKELETAL:  No joint swelling. NEUROLOGIC:  Cranial nerves I through XII was intact. PSYCHIATRIC:  Depressed affect. LABORATORY DATA:  As above. ASSESSMENT:  Variable control of type 2 diabetes due to chronic kidney  disease. Variable p.o. intake. Cholangiocarcinoma with possible  hepatic mets, possible cirrhosis. PLAN:  Lower Lantus to 10 units at lunchtime, Humalog 4 units with each  meals, hold if sugars less than 120. Low-dose Humalog coverage. Monitor glycemic control closely. Blood sugar range 150-250. Avoid  severe hyperglycemia and hypoglycemia. Long-term A1c goal of 7.5-8.5  range in view of multiple comorbid conditions. Continue other  supportive measures in the meantime. Thank you for the consult.         Karine Pak MD    D: 10/06/2020 21:58:30       T: 10/06/2020 22:03:43     [email or web address]  Job#: [id number]     Doc#: [id number]    CC:

## 2020-10-07 NOTE — PLAN OF CARE
Problem: Skin Integrity:  Goal: Will show no infection signs and symptoms  Description: Will show no infection signs and symptoms  Outcome: Not Met This Shift  Goal: Absence of new skin breakdown  Description: Absence of new skin breakdown  Outcome: Not Met This Shift     Problem: Falls - Risk of:  Goal: Will remain free from falls  Description: Will remain free from falls  Outcome: Not Met This Shift  Goal: Absence of physical injury  Description: Absence of physical injury  Outcome: Not Met This Shift     Problem: Confusion - Acute:  Goal: Absence of continued neurological deterioration signs and symptoms  Description: Absence of continued neurological deterioration signs and symptoms  Outcome: Not Met This Shift  Goal: Mental status will be restored to baseline  Description: Mental status will be restored to baseline  Outcome: Not Met This Shift     Problem: Discharge Planning:  Goal: Ability to perform activities of daily living will improve  Description: Ability to perform activities of daily living will improve  Outcome: Not Met This Shift  Goal: Participates in care planning  Description: Participates in care planning  Outcome: Not Met This Shift  Goal: Discharged to appropriate level of care  Description: Discharged to appropriate level of care  Outcome: Not Met This Shift     Problem: Injury - Risk of, Physical Injury:  Goal: Will remain free from falls  Description: Will remain free from falls  Outcome: Not Met This Shift  Goal: Absence of physical injury  Description: Absence of physical injury  Outcome: Not Met This Shift     Problem: Mobility - Impaired:  Goal: Mobility will improve  Description: Mobility will improve  Outcome: Not Met This Shift     Problem: Serum Glucose Level - Abnormal:  Goal: Ability to maintain appropriate glucose levels will improve  Description: Ability to maintain appropriate glucose levels will improve  Outcome: Not Met This Shift

## 2020-10-07 NOTE — CONSULTS
[facility]  Consult [facility]           Reason for Consult:  ckd stage 4   Requesting Physician:  Dr. London Mcmahan    Chief Complaint:  hyperglycemia  History Obtained From:  patient, electronic medical record    History of Present Ilness:    70y.o. year old female admitted with acute confusion, hyperglycemia. 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 Being seen by oncology and deciding on chemo vs surgery and then chemo. Sugars were over 400 initially. Getter now. I have seen her for CKD since 2008 with risk factors of DM, HTN, retinopathy. Baseline cr around 2. Has underlying proteinuria. Has ascites. Has required paracentesis. Plan for d/c tomorrow.           Past Medical History:        Diagnosis Date    Depression     Hypertension        Past Surgical History:        Procedure Laterality Date    PARACENTESIS Left 10/06/2020    3330 cc by Dr. Lucila Heredia. Home Medications:    No current facility-administered medications on file prior to encounter. Current Outpatient Medications on File Prior to Encounter   Medication Sig Dispense Refill    amLODIPine (NORVASC) 10 MG tablet Take 10 mg by mouth daily      insulin lispro (HUMALOG) 100 UNIT/ML injection vial Inject 10 Units into the skin 3 times daily (before meals)      aspirin 81 MG tablet Take 81 mg by mouth daily         Allergies:  Patient has no known allergies.     Social History:    Social History     Socioeconomic History    Marital status:      Spouse name: Not on file    Number of children: Not on file    Years of education: Not on file    Highest education level: Not on file   Occupational History    Not on file   Social Needs    Financial resource strain: Not on file    Food insecurity     Worry: Not on file     Inability: Not on file    Transportation needs     Medical: Not on file     Non-medical: Not on file   Tobacco Use    Smoking status: Never Smoker   Substance and Sexual Activity  Alcohol use: No    Drug use: No    Sexual activity: Yes     Partners: Male   Lifestyle    Physical activity     Days per week: Not on file     Minutes per session: Not on file    Stress: Not on file   Relationships    Social connections     Talks on phone: Not on file     Gets together: Not on file     Attends Scientologist service: Not on file     Active member of club or organization: Not on file     Attends meetings of clubs or organizations: Not on file     Relationship status: Not on file    Intimate partner violence     Fear of current or ex partner: Not on file     Emotionally abused: Not on file     Physically abused: Not on file     Forced sexual activity: Not on file   Other Topics Concern    Not on file   Social History Narrative    Not on file       Family History:   Family History   Problem Relation Age of Onset    Cancer Mother         stomach    Cancer Father         throat    Arthritis Neg Hx     Asthma Neg Hx     Birth Defects Neg Hx     Depression Neg Hx     Diabetes Neg Hx     Early Death Neg Hx     Hearing Loss Neg Hx     Heart Disease Neg Hx     High Blood Pressure Neg Hx     High Cholesterol Neg Hx     Kidney Disease Neg Hx     Learning Disabilities Neg Hx     Mental Illness Neg Hx     Mental Retardation Neg Hx     Miscarriages / Stillbirths Neg Hx     Stroke Neg Hx     Substance Abuse Neg Hx     Vision Loss Neg Hx     Other Neg Hx        Review of Systems:   Review of Systems   Constitutional: Positive for activity change. HENT: Negative for congestion. Eyes: Negative for visual disturbance. Respiratory: Positive for shortness of breath. Cardiovascular: Positive for leg swelling. Gastrointestinal: Positive for abdominal distention. Endocrine: Negative for cold intolerance. Genitourinary: Negative for difficulty urinating. Musculoskeletal: Negative for arthralgias. Allergic/Immunologic: Negative for environmental allergies.    Neurological: Negative for dizziness. Hematological: Negative for adenopathy. Physical exam:   Constitutional:  VITALS:  BP (!) 122/56   Pulse 113   Temp 98.8 °F (37.1 °C)   Resp 16   Ht 5' 3\" (1.6 m)   Wt 143 lb (64.9 kg)   SpO2 95%   BMI 25.33 kg/m²   Gen: thin, in NAD  Skin: no rash, turgor wnl  Heent:  eomi, mmm  Neck: no bruits or jvd noted, thyroid normal  Lungs:  Normal expansion. Clear to auscultation. No rales, rhonchi, or wheezing. Heart:  Heart sounds are normal.  Regular rate and rhythm without murmur, gallop or rub. Abdomen:  Fluid wave  Extremities: 2+ edema  Psychiatric: mood and affect appropriate; judgement and insight intact  Musculoskeletal:  Rom, muscular strength intact; digits, nails normal    Data/  CBC:   Lab Results   Component Value Date    WBC 9.3 10/07/2020    RBC 3.20 10/07/2020    HGB 6.9 10/07/2020    HCT 21.7 10/07/2020    MCV 67.9 10/07/2020    MCH 21.6 10/07/2020    MCHC 31.8 10/07/2020    RDW 21.3 10/07/2020     10/07/2020     BMP:    Lab Results   Component Value Date     10/07/2020    K 4.4 10/07/2020     10/07/2020    CO2 19 10/07/2020    BUN 55 10/07/2020    LABALBU 1.9 10/07/2020    LABALBU 2.7 07/13/2020    CREATININE 2.27 10/07/2020    CALCIUM 7.8 10/07/2020    GFRAA 25.5 10/07/2020    LABGLOM 21.1 10/07/2020    GLUCOSE 252 10/07/2020    GLUCOSE 576 07/13/2020     Ct Abdomen Pelvis Wo Contrast Additional Contrast? None    Result Date: 10/5/2020  CT of the Abdomen and Pelvis without intravenous contrast medium History:  Diffuse abdominal pain. Recent fall. Abdominal distention. History renal disease. Technical Factors: CT imaging of the abdomen and pelvis were obtained and formatted as 5 mm contiguous axial images from the domes of the diaphragm to the symphysis pubis. Sagittal and coronal reconstructions were also obtained. Oral contrast medium:  None. Intravenous contrast medium:  None. Comparison:  None.  Findings: Study limited secondary to lack of intravenous and oral contrast media. Lungs:  Small bilateral pleural effusions with dependent reticular and subsegmental atelectatic bilaterally, greater on right. Liver:  Normal in size, shape, and attenuation. Small in size and nodular in contour. Multiple ill-defined and rounded areas of decreased attenuation throughout the liver. Largest area affected is within the right hepatic lobe with low attenuation foci measuring up to 4 x 6 cm. Bile Ducts:  Normal in caliber. Gallbladder:  No stones or wall thickening. Pancreas:  Normal without masses, cysts, ductal dilatation or calcification. Spleen:  Normal in size without masses or calcifications. No splenules. Kidneys:  Normal in size. Moderate right, with mild to moderate left pelvocaliectasis. Renal vascular calcification identified bilaterally with possible superimposed bilateral renal calculi measuring up to 2 mm. 2 cm cyst lateral midpole left kidney. Adrenals:  Normal. Small bowel:  Normal in caliber. Not well visualized secondary to lack of opacification. Appendix:  Not visualized. Colon:  Normal in caliber. Peritoneum:  No free air. Free fluid is identified within the right and anterior perihepatic spaces, subphrenic and perisplenic spaces, mesentery, Morison's pouch and bilateral paracolic gutters, and continuing to extending caudally into the pelvis. Vessels: Aorta normal in course and caliber. Probable bilateral femoral-popliteal bypass grafts. Lymph nodes:  Retroperitoneal:  No enlarged retroperitoneal lymph nodes. Mesenteric:  No enlarged mesenteric lymph nodes. Pelvic: No enlarged pelvic lymph nodes. Ureters: Not well visualized. Bladder: No wall thickening. Reproductive organs: No pelvic masses. Abdominal Wall:  No hernia identified  Diffuse edematous change of the abdominal and pelvic sidewalls. Bones:  No bone lesions. Diffuse disc space narrowing, T6-7 through T11-12 disc spaces, greatest at T7-8 through T9-10 disc spaces.  No post operative changes. Multiple ill-defined and rounded low attenuation foci throughout the right and left hepatic lobes. Malignancy diagnosis of exclusion. Cirrhosis. Anasarca, with large volume ascites. Moderate right and mild to moderate left hydronephrosis with bilateral renal vascular calcification, and possible superimposed bilateral renal calculi measuring up to 2 mm. No etiology of obstruction identified on this noncontrast enhanced study. All CT scans at this facility use dose modulation, iterative reconstruction, and/or weight based dosing when appropriate to reduce radiation dose to as low as reasonably achievable. Ct Head Wo Contrast    Result Date: 10/5/2020  EXAMINATION: CT HEAD WO CONTRAST  DATE AND TIME:10/5/2020 7:36 AM CLINICAL HISTORY: Severe headache.  metabolic encephalopathy  COMPARISON: None TECHNIQUE:Axial CT from skull base to vertex without  contrast..  All CT scans at this facility use dose modulation, iterative reconstruction, and/or weight based dosing when appropriate to reduce radiation dose to as low as reasonably achievable. FINDINGS CSF spaces: Ventricles are normal in size and position. Sulci are appropriate for age. Brain parenchyma: No  parenchymal mass,  mass effect,  signs of acute infarct, or extra-axial fluid. There are mild patchy nonspecific white matter hypodensities that may be related to microvascular ischemic change or  normal aging. Hemorrhage:No acute intracranial hemorrhage. Skull base: The bony calvarium and skull base are normal.   The visualized paranasal sinuses and mastoids are clear. NO ACUTE INTRACRANIAL PATHOLOGY. Xr Chest Portable    Result Date: 10/5/2020  EXAMINATION: XR CHEST PORTABLE CLINICAL HISTORY: COUGH, SHORTNESS OF BREATH, ALTERED MENTAL STATUS COMPARISONS: None available. FINDINGS: Osseous structures intact. Cardiopericardial silhouette normal. Aorta calcified. Right diaphragm elevated. Pulmonary vasculature indistinct. Ill-defined areas increase opacity at lung bases, greater on right. BILATERAL LOWER LUNG ATELECTASIS/PNEUMONIA VERSUS EDEMA. Us Dup Abd Pel Retro Scrot Complete    Result Date: 10/5/2020  EXAMINATION: Duplex liver Doppler CLINICAL HISTORY: Abnormal liver function studies. Abnormal CT. FINDINGS: Arterial duplex of the liver performed. . The study also evaluated the liver which was extremely heterogeneous in echotexture. There is ascites. There were poorly defined areas of mixed echogenicity in the right lobe of the liver with nondescript borders. A focal mass or confluence of multiple masses within the liver suspected. In conglomeration these measured 6.5 x 8.5 cm in the central right lobe of the liver. Liver tumor suspected either metastatic or primary. Occasionally fatty liver and cirrhosis can have an appearance such as this and therefore confirmation with contrast-enhanced cross-sectional imaging (CT or MRI) recommended to confirm this, if not noted on outside studies. There was flow in the splenic vein to the level of the confluence. The portal vein at this level showed no flow. The vessel itself was small in caliber measuring 8 mm in diameter. No flow in the branches of the portal vein within the liver demonstrated. Arterial flow in the hepatic artery has elevated systolic velocity amplitude. Left hepatic artery systolic velocity is 889 cm/s. Flow in the common hepatic artery is 111 cm per sec. There is normal phasicity and waveform in the hepatic veins within the liver and in the IVC. The spleen was not evaluated. CHRONIC PORTAL VEIN THROMBOSIS. LIVER MASSES SUSPECTED CONSISTENT WITH MULTICENTRIC HEPATOCELLULAR CARCINOMA VERSUS METASTATIC DISEASE. CONFIRM WITH FURTHER CROSS-SECTIONAL IMAGING AS DISCUSSED IN THE REPORT. Xr Hip 2-3 Vw W Pelvis Left    Result Date: 10/5/2020  EXAMINATION: AP pelvis left hip. 3 films CLINICAL HISTORY: Trauma.  Posterior left hip pain FINDINGS: AP pelvis and hip and a shoot through lateral left hip performed. The bone is adequately mineralized and grossly intact. No fracture or dislocation. Moderate degenerative changes overlie the hip joints bilaterally. Mild degenerative changes in the lower lumbar spine. Vascular calcifications noted in the pelvis and proximal thighs bilaterally. MODERATE DEGENERATIVE HIP DISEASE. NO FRACTURE OR DISLOCATION. Assessment/  71 yo lady with ckd stage 3-4 due to DM, HTN with newly diagnosed cholangio / liver cancer. Has ascites and fluid overload. Sig swelling. Anemia. bp is ok. Plan/  1- add torsemide 40 for edema. Likely send out on 20mg. Her alb 1.9 likely contributing. Not sure how much diuretics she will tolerate  2- will defer on eddie to cancer docs  3- ok for d/c tomorrow    Thank you for the consultation. Please do not hesitate to call with questions.     José Baez

## 2020-10-07 NOTE — PROGRESS NOTES
Message sent to Dr Seema Yusuf regarding the patient appearing more confused and shaky than she was when this nursed cared for her on 10-5-2020, the daughter in the room at this time and would like to have the doctor assess her, awaiting for a response

## 2020-10-07 NOTE — PROGRESS NOTES
Infectious Diseases Inpatient Progress Note          HISTORY OF PRESENT ILLNESS:  Follow up CAP, lateral hydronephrosis, metastatic cholangiocarcinoma with ascites status post paracentesis on IV Rocephin, well tolerated. Patient is more awake. Some intermittent confusion. No pain. Poor appetite. Very weak. Current Medications:     [START ON 10/8/2020] insulin glargine  20 Units Subcutaneous Daily before lunch    insulin lispro  8 Units Subcutaneous TID AC    insulin lispro  0-12 Units Subcutaneous TID WC    insulin lispro  0-6 Units Subcutaneous Nightly    enoxaparin  30 mg Subcutaneous Daily    guaiFENesin  600 mg Oral BID    cefTRIAXone (ROCEPHIN) IV  1 g Intravenous Q24H    azithromycin  250 mg Intravenous Q24H    amLODIPine  10 mg Oral Daily    aspirin  81 mg Oral Daily    hydrALAZINE  50 mg Oral 3 times per day    albuterol-ipratropium  1 puff Inhalation TID    lactulose  20 g Oral BID       Allergies:  Patient has no known allergies. Review of Systems   Cardiovascular: Positive for leg swelling. Gastrointestinal: Positive for abdominal distention. All other systems reviewed and are negative. Physical Exam  Vitals:    10/06/20 2040 10/07/20 0727 10/07/20 0732 10/07/20 1235   BP:  (!) 122/56     Pulse:  113     Resp:  16     Temp:  100 °F (37.8 °C)  98.8 °F (37.1 °C)   TempSrc:  Oral     SpO2: 99% 99% 95%    Weight:       Height:         General Appearance: alert, in no acute distress, on room air  Opens eyes briefly to verbal stimulation, disoriented to place  Skin: warm and dry, no rash. Head: normocephalic and atraumatic  Eyes: anicteric sclerae  ENT: oropharynx clear and moist with normal mucous membranes.  No oral thrush  Lungs: normal respiratory effort, reduced breath sounds bilateral bases, rhonchi or wheezing  Heart regular rhythm, murmur  Abdomen: soft, no tenderness, + distention   +2 B leg edema  No erythema, no tenderness      DATA:    Lab Results   Component Value

## 2020-10-07 NOTE — CARE COORDINATION
Spoke with patient and spouse Vinnie at the bedside to confirm plan for discharge is still home with [facility]. Both agreed. Kennethjemimaiván Fishbryce has previously accepted and will need to be contacted with discharge information and the [facility] order will need to be faxed.  Electronically signed by Carolin Apple RN on 10/7/2020 at 2:44 PM

## 2020-10-07 NOTE — PROGRESS NOTES
1930  Pt was in the bathroom and did not wait for me to help her back to bed and her palm was removed without being deflated. Pt was unaware this had happened and denies pain. She does have minimal bleeding. Message sent to hospitalist.    [address] Palm to be left out and monitor for retention. 0410  Critical Hgb of 6.9.   Perfect Serve message to  with reply of \"Ok\"

## 2020-10-07 NOTE — PROGRESS NOTES
Progress Note  Date:10/7/2020       XTMX:[id number]  Patient Name:Jaye Lynn     YOB: 1945     Age:75 y.o. Pt was seen and evaluated, spoke to pt daughters about the care. Subjective    Subjective:  Symptoms:  She reports malaise and weakness. No shortness of breath, cough, chest pain, headache, chest pressure, anorexia, diarrhea or anxiety. Diet:  No nausea or vomiting. Review of Systems   Respiratory: Negative for cough and shortness of breath. Cardiovascular: Negative for chest pain. Gastrointestinal: Negative for anorexia, diarrhea, nausea and vomiting. Neurological: Positive for weakness. Objective         Vitals Last 24 Hours:  TEMPERATURE:  Temp  Av.7 °F (37.1 °C)  Min: 97.5 °F (36.4 °C)  Max: 100 °F (37.8 °C)  RESPIRATIONS RANGE: Resp  Av.2  Min: 16  Max: 17  PULSE OXIMETRY RANGE: SpO2  Av.6 %  Min: 95 %  Max: 100 %  PULSE RANGE: Pulse  Av.6  Min: 85  Max: 116  BLOOD PRESSURE RANGE: Systolic (43HRN), NPL:736 , Min:122 , CSB:314   ; Diastolic (17TVJ), VIF:37, Min:48, Max:71    I/O (24Hr): Intake/Output Summary (Last 24 hours) at 10/7/2020 1353  Last data filed at 10/6/2020 2247  Gross per 24 hour   Intake 120 ml   Output 450 ml   Net -330 ml     Objective:  General Appearance:  Ill-appearing. Vital signs: (most recent): Blood pressure (!) 122/56, pulse 113, temperature 98.8 °F (37.1 °C), resp. rate 16, height 5' 3\" (1.6 m), weight 143 lb (64.9 kg), SpO2 95 %. HEENT: Normal HEENT exam.    Lungs:  Normal effort and normal respiratory rate. Abdomen: Abdomen is distended. There is generalized tenderness. Pulses: Distal pulses are intact. Neurological: Patient is alert. Pupils:  Pupils are equal, round, and reactive to light. Skin:  Warm and dry.       Labs/Imaging/Diagnostics    Labs:  CBC:  Recent Labs     10/04/20  2245 10/04/20  2342 10/06/20  0515 10/07/20  0521   WBC 7.0  --  7.7 9.3   RBC 3.65*  --  3.34* 3.20* HGB 8.0* 9.0* 7.4* 6.9*   HCT 24.5*  --  22.5* 21.7*   MCV 67.1*  --  67.4* 67.9*   RDW 20.7*  --  20.7* 21.3*     --  199 196     CHEMISTRIES:  Recent Labs     10/04/20  2245  10/05/20  0525 10/06/20  0515 10/07/20  0521   *  --  132* 132* 133*   K 4.6  --  3.9 3.9 4.4   CL 98  --  101 100 101   CO2 21  --  19* 17* 19*   BUN 37*  --  33* 47* 55*   CREATININE 1.60*   < > 1.47* 2.12* 2.27*   GLUCOSE 261*  --  203* 112* 252*   PHOS  --   --  3.1  --   --    MG 1.9  --   --   --   --     < > = values in this interval not displayed. PT/INR:  Recent Labs     10/06/20  0515 10/07/20  0521   PROTIME 14.9 16.0*   INR 1.2 1.3     APTT:No results for input(s): APTT in the last 72 hours. LIVER PROFILE:  Recent Labs     10/05/20  0525 10/06/20  0515 10/07/20  0521   AST 73* 187* 181*   ALT 29 54* 70*   BILIDIR 0.5* 0.4 0.4   BILITOT 0.7 0.6 0.6   ALKPHOS 405* 400* 396*       Imaging Last 24 Hours:  Ct Abdomen Pelvis Wo Contrast Additional Contrast? None    Result Date: 10/5/2020  CT of the Abdomen and Pelvis without intravenous contrast medium History:  Diffuse abdominal pain. Recent fall. Abdominal distention. History renal disease. Technical Factors: CT imaging of the abdomen and pelvis were obtained and formatted as 5 mm contiguous axial images from the domes of the diaphragm to the symphysis pubis. Sagittal and coronal reconstructions were also obtained. Oral contrast medium:  None. Intravenous contrast medium:  None. Comparison:  None. Findings: Study limited secondary to lack of intravenous and oral contrast media. Lungs:  Small bilateral pleural effusions with dependent reticular and subsegmental atelectatic bilaterally, greater on right. Liver:  Normal in size, shape, and attenuation. Small in size and nodular in contour. Multiple ill-defined and rounded areas of decreased attenuation throughout the liver.  Largest area affected is within the right hepatic lobe with low attenuation foci measuring up to 4 x 6 cm. Bile Ducts:  Normal in caliber. Gallbladder:  No stones or wall thickening. Pancreas:  Normal without masses, cysts, ductal dilatation or calcification. Spleen:  Normal in size without masses or calcifications. No splenules. Kidneys:  Normal in size. Moderate right, with mild to moderate left pelvocaliectasis. Renal vascular calcification identified bilaterally with possible superimposed bilateral renal calculi measuring up to 2 mm. 2 cm cyst lateral midpole left kidney. Adrenals:  Normal. Small bowel:  Normal in caliber. Not well visualized secondary to lack of opacification. Appendix:  Not visualized. Colon:  Normal in caliber. Peritoneum:  No free air. Free fluid is identified within the right and anterior perihepatic spaces, subphrenic and perisplenic spaces, mesentery, Morison's pouch and bilateral paracolic gutters, and continuing to extending caudally into the pelvis. Vessels: Aorta normal in course and caliber. Probable bilateral femoral-popliteal bypass grafts. Lymph nodes:  Retroperitoneal:  No enlarged retroperitoneal lymph nodes. Mesenteric:  No enlarged mesenteric lymph nodes. Pelvic: No enlarged pelvic lymph nodes. Ureters: Not well visualized. Bladder: No wall thickening. Reproductive organs: No pelvic masses. Abdominal Wall:  No hernia identified  Diffuse edematous change of the abdominal and pelvic sidewalls. Bones:  No bone lesions. Diffuse disc space narrowing, T6-7 through T11-12 disc spaces, greatest at T7-8 through T9-10 disc spaces. No post operative changes. Multiple ill-defined and rounded low attenuation foci throughout the right and left hepatic lobes. Malignancy diagnosis of exclusion. Cirrhosis. Anasarca, with large volume ascites. Moderate right and mild to moderate left hydronephrosis with bilateral renal vascular calcification, and possible superimposed bilateral renal calculi measuring up to 2 mm.  No etiology of obstruction identified on this noncontrast enhanced study. All CT scans at this facility use dose modulation, iterative reconstruction, and/or weight based dosing when appropriate to reduce radiation dose to as low as reasonably achievable. Ct Head Wo Contrast    Result Date: 10/5/2020  EXAMINATION: CT HEAD WO CONTRAST  DATE AND TIME:10/5/2020 7:36 AM CLINICAL HISTORY: Severe headache.  metabolic encephalopathy  COMPARISON: None TECHNIQUE:Axial CT from skull base to vertex without  contrast..  All CT scans at this facility use dose modulation, iterative reconstruction, and/or weight based dosing when appropriate to reduce radiation dose to as low as reasonably achievable. FINDINGS CSF spaces: Ventricles are normal in size and position. Sulci are appropriate for age. Brain parenchyma: No  parenchymal mass,  mass effect,  signs of acute infarct, or extra-axial fluid. There are mild patchy nonspecific white matter hypodensities that may be related to microvascular ischemic change or  normal aging. Hemorrhage:No acute intracranial hemorrhage. Skull base: The bony calvarium and skull base are normal.   The visualized paranasal sinuses and mastoids are clear. NO ACUTE INTRACRANIAL PATHOLOGY. Xr Chest Portable    Result Date: 10/5/2020  EXAMINATION: XR CHEST PORTABLE CLINICAL HISTORY: COUGH, SHORTNESS OF BREATH, ALTERED MENTAL STATUS COMPARISONS: None available. FINDINGS: Osseous structures intact. Cardiopericardial silhouette normal. Aorta calcified. Right diaphragm elevated. Pulmonary vasculature indistinct. Ill-defined areas increase opacity at lung bases, greater on right. BILATERAL LOWER LUNG ATELECTASIS/PNEUMONIA VERSUS EDEMA. Us Dup Abd Pel Retro Scrot Complete    Result Date: 10/5/2020  EXAMINATION: Duplex liver Doppler CLINICAL HISTORY: Abnormal liver function studies. Abnormal CT. FINDINGS: Arterial duplex of the liver performed. . The study also evaluated the liver which was extremely heterogeneous in echotexture. There is ascites. There were poorly defined areas of mixed echogenicity in the right lobe of the liver with nondescript borders. A focal mass or confluence of multiple masses within the liver suspected. In conglomeration these measured 6.5 x 8.5 cm in the central right lobe of the liver. Liver tumor suspected either metastatic or primary. Occasionally fatty liver and cirrhosis can have an appearance such as this and therefore confirmation with contrast-enhanced cross-sectional imaging (CT or MRI) recommended to confirm this, if not noted on outside studies. There was flow in the splenic vein to the level of the confluence. The portal vein at this level showed no flow. The vessel itself was small in caliber measuring 8 mm in diameter. No flow in the branches of the portal vein within the liver demonstrated. Arterial flow in the hepatic artery has elevated systolic velocity amplitude. Left hepatic artery systolic velocity is 458 cm/s. Flow in the common hepatic artery is 111 cm per sec. There is normal phasicity and waveform in the hepatic veins within the liver and in the IVC. The spleen was not evaluated. CHRONIC PORTAL VEIN THROMBOSIS. LIVER MASSES SUSPECTED CONSISTENT WITH MULTICENTRIC HEPATOCELLULAR CARCINOMA VERSUS METASTATIC DISEASE. CONFIRM WITH FURTHER CROSS-SECTIONAL IMAGING AS DISCUSSED IN THE REPORT. Xr Hip 2-3 Vw W Pelvis Left    Result Date: 10/5/2020  EXAMINATION: AP pelvis left hip. 3 films CLINICAL HISTORY: Trauma. Posterior left hip pain FINDINGS: AP pelvis and hip and a shoot through lateral left hip performed. The bone is adequately mineralized and grossly intact. No fracture or dislocation. Moderate degenerative changes overlie the hip joints bilaterally. Mild degenerative changes in the lower lumbar spine. Vascular calcifications noted in the pelvis and proximal thighs bilaterally. MODERATE DEGENERATIVE HIP DISEASE.  NO FRACTURE OR DISLOCATION. Assessment//Plan           Hospital Problems           Last Modified POA    Weakness 10/5/2020 Yes    Uncontrolled type 2 diabetes mellitus with hyperglycemia (HCC) 10/6/2020 Yes      BP (!) 122/56   Pulse 113   Temp 98.8 °F (37.1 °C)   Resp 16   Ht 5' 3\" (1.6 m)   Wt 143 lb (64.9 kg)   SpO2 95%   BMI 25.33 kg/m²     Assessment & Plan    1) DM2 with hyperglycemia  better  2) hepatic encephalopathy  Starte lactulose  better  3) cholangiocarcinoma with hepatic mets  Palliative care  4) elevated procalcitonin  due to PNA  COVID ruled outpt  5) HTN better controlled  Adjust meds  6) chronic portal vein thrombosis  FU [id number]  Endo eval  Updated the daughter about the plan  C/w SSI  Overall prognosis is poor  7) PNA  C/w IV abx  Anticipated discharge tomorrow  S/p tap more than 3 liter removed.   Electronically signed by Randee Carmona MD on 10/5/20 at 1:54 PM EDT

## 2020-10-07 NOTE — PROGRESS NOTES
Physical Therapy Med Surg Initial Assessment  [facility]  [facility]       NAME: Levern Denver  : 1945 (70 y.o.)  MRN: [id number]  CODE STATUS: Full Code    Date of Service: 10/7/2020    Patient Diagnosis(es): Weakness [R53.1]   Chief Complaint   Patient presents with    Hyperglycemia     in 400's at home was given 10 units before leaving now 0     Fall     Patient Active Problem List    Diagnosis Date Noted    Uncontrolled type 2 diabetes mellitus with hyperglycemia ([address]     Weakness 10/05/2020    Chronic kidney disease, stage III (moderate) 2019    Erythropoietin deficiency anemia 2019        Past Medical History:   Diagnosis Date    Depression     Hypertension      Past Surgical History:   Procedure Laterality Date    PARACENTESIS Left 10/06/2020    3330 cc by Dr. Nate Meadows. Chart Reviewed: Yes  Family / Caregiver Present: Yes(spouse)  General Comment  Comments: Pt resting in bed - agreeable to PT evaluation    Restrictions:  Restrictions/Precautions: Fall Risk     SUBJECTIVE: Subjective: \"I'm fine. \"    Pain  Pre Treatment Pain Screening  Comments / Details: denies    Post Treatment Pain Screening:   Pain Assessment  Pain Assessment: (denies)    Prior Level of Function:  Social/Functional History  Lives With: Spouse  Type of Home: House  Home Layout: One level  Home Access: Level entry  Home Equipment: Rolling walker  ADL Assistance: Independent  Ambulation Assistance: Independent(no AD)  Transfer Assistance: Independent  Additional Comments: Per RN - pt's family provides assist as needed. OBJECTIVE:   Vision: Within Functional Limits  Hearing: Within functional limits    Cognition:  Overall Orientation Status: Within Functional Limits  Follows Commands: (with repetition)    Observation/Palpation  Observation: No acute distress noted. Pt impulsive.     ROM:  RLE PROM: WFL  LLE PROM: WFL    Strength:  Strength RLE  Strength RLE: [facility]  Strength ADL/Self-care Training, Cognitive/Perceptual Training, Endurance Training, Gait Training, Neuromuscular Re-education, Home Exercise Program, Safety Education & Training, Patient/Caregiver Education & Training, Equipment Evaluation, Education, & procurement  Safety Devices  Type of devices: All fall risk precautions in place    Goals:  Short term goals  Short term goal 1: Pt to complete HEP with supervision  Long term goals  Long term goal 1: Pt to complete bed mobility with indep  Long term goal 2: Pt to complete transfers with supervision  Long term goal 3: Pt to ambulate with LRD 100ft and supervision    AMPAC (6 CLICK) BASIC MOBILITY  AM-PAC Inpatient Mobility Raw Score : 17     Therapy Time:   Individual   Time In 1350   Time Out 1415   Minutes [address], 10/07/20 at 2:56 PM         Definitions for assistance levels  Independent = pt does not require any physical supervision or assistance from another person for activity completion. Device may be needed.   Stand by assistance = pt requires verbal cues or instructions from another person, close to but not touching, to perform the activity  Minimal assistance= pt performs 75% or more of the activity; assistance is required to complete the activity  Moderate assistance= pt performs 50% of the activity; assistance is required to complete the activity  Maximal assistance = pt performs 25% of the activity; assistance is required to complete the activity  Dependent = pt requires total physical assistance to accomplish the task

## 2020-10-08 LAB
ABO/RH: NORMAL
ALBUMIN SERPL-MCNC: 2 G/DL (ref 3.5–4.6)
ALP BLD-CCNC: 372 U/L (ref 40–130)
ALT SERPL-CCNC: 59 U/L (ref 0–33)
AMMONIA: 57 UMOL/L (ref 11–51)
ANION GAP SERPL CALCULATED.3IONS-SCNC: 11 MEQ/L (ref 9–15)
ANION GAP SERPL CALCULATED.3IONS-SCNC: 15 MEQ/L (ref 9–15)
ANTIBODY SCREEN: NORMAL
AST SERPL-CCNC: 101 U/L (ref 0–35)
BASOPHILS ABSOLUTE: 0 K/UL (ref 0–0.2)
BASOPHILS RELATIVE PERCENT: 0.3 %
BILIRUB SERPL-MCNC: 0.6 MG/DL (ref 0.2–0.7)
BILIRUBIN DIRECT: 0.5 MG/DL (ref 0–0.4)
BILIRUBIN, INDIRECT: 0.1 MG/DL (ref 0–0.6)
BLOOD BANK DISPENSE STATUS: NORMAL
BLOOD BANK PRODUCT CODE: NORMAL
BPU ID: NORMAL
BUN BLDV-MCNC: 60 MG/DL (ref 8–23)
BUN BLDV-MCNC: 61 MG/DL (ref 8–23)
CALCIUM SERPL-MCNC: 7.6 MG/DL (ref 8.5–9.9)
CALCIUM SERPL-MCNC: 7.9 MG/DL (ref 8.5–9.9)
CHLORIDE BLD-SCNC: 95 MEQ/L (ref 95–107)
CHLORIDE BLD-SCNC: 97 MEQ/L (ref 95–107)
CO2: 16 MEQ/L (ref 20–31)
CO2: 18 MEQ/L (ref 20–31)
CREAT SERPL-MCNC: 2.83 MG/DL (ref 0.5–0.9)
CREAT SERPL-MCNC: 2.9 MG/DL (ref 0.5–0.9)
DESCRIPTION BLOOD BANK: NORMAL
EOSINOPHILS ABSOLUTE: 0.1 K/UL (ref 0–0.7)
EOSINOPHILS RELATIVE PERCENT: 0.7 %
GFR AFRICAN AMERICAN: 19.2
GFR AFRICAN AMERICAN: 19.8
GFR NON-AFRICAN AMERICAN: 15.9
GFR NON-AFRICAN AMERICAN: 16.3
GLUCOSE BLD-MCNC: 108 MG/DL (ref 60–115)
GLUCOSE BLD-MCNC: 111 MG/DL (ref 60–115)
GLUCOSE BLD-MCNC: 125 MG/DL (ref 70–99)
GLUCOSE BLD-MCNC: 178 MG/DL (ref 70–99)
GLUCOSE BLD-MCNC: 205 MG/DL (ref 60–115)
GLUCOSE BLD-MCNC: 245 MG/DL (ref 60–115)
GLUCOSE BLD-MCNC: 46 MG/DL (ref 60–115)
GLUCOSE BLD-MCNC: 57 MG/DL (ref 60–115)
HCT VFR BLD CALC: 21.2 % (ref 37–47)
HEMOGLOBIN: 6.9 G/DL (ref 12–16)
INR BLD: 1.4
LYMPHOCYTES ABSOLUTE: 0.6 K/UL (ref 1–4.8)
LYMPHOCYTES RELATIVE PERCENT: 4.7 %
MCH RBC QN AUTO: 21.7 PG (ref 27–31.3)
MCHC RBC AUTO-ENTMCNC: 32.3 % (ref 33–37)
MCV RBC AUTO: 67.3 FL (ref 82–100)
MONOCYTES ABSOLUTE: 0.7 K/UL (ref 0.2–0.8)
MONOCYTES RELATIVE PERCENT: 5.6 %
NEUTROPHILS ABSOLUTE: 10.7 K/UL (ref 1.4–6.5)
NEUTROPHILS RELATIVE PERCENT: 88.7 %
PDW BLD-RTO: 21.4 % (ref 11.5–14.5)
PERFORMED ON: ABNORMAL
PERFORMED ON: NORMAL
PERFORMED ON: NORMAL
PLATELET # BLD: 204 K/UL (ref 130–400)
POTASSIUM REFLEX MAGNESIUM: 4.2 MEQ/L (ref 3.4–4.9)
POTASSIUM SERPL-SCNC: 4.8 MEQ/L (ref 3.4–4.9)
PROTHROMBIN TIME: 16.7 SEC (ref 12.3–14.9)
RBC # BLD: 3.15 M/UL (ref 4.2–5.4)
SODIUM BLD-SCNC: 126 MEQ/L (ref 135–144)
SODIUM BLD-SCNC: 126 MEQ/L (ref 135–144)
TOTAL PROTEIN: 6 G/DL (ref 6.3–8)
WBC # BLD: 12.1 K/UL (ref 4.8–10.8)

## 2020-10-08 PROCEDURE — 85610 PROTHROMBIN TIME: CPT

## 2020-10-08 PROCEDURE — 86900 BLOOD TYPING SEROLOGIC ABO: CPT

## 2020-10-08 PROCEDURE — 6370000000 HC RX 637 (ALT 250 FOR IP): Performed by: INTERNAL MEDICINE

## 2020-10-08 PROCEDURE — 6360000002 HC RX W HCPCS: Performed by: INTERNAL MEDICINE

## 2020-10-08 PROCEDURE — 36415 COLL VENOUS BLD VENIPUNCTURE: CPT

## 2020-10-08 PROCEDURE — 94664 DEMO&/EVAL PT USE INHALER: CPT

## 2020-10-08 PROCEDURE — 99221 1ST HOSP IP/OBS SF/LOW 40: CPT | Performed by: UROLOGY

## 2020-10-08 PROCEDURE — 82140 ASSAY OF AMMONIA: CPT

## 2020-10-08 PROCEDURE — 80048 BASIC METABOLIC PNL TOTAL CA: CPT

## 2020-10-08 PROCEDURE — P9016 RBC LEUKOCYTES REDUCED: HCPCS

## 2020-10-08 PROCEDURE — 0W9G3ZZ DRAINAGE OF PERITONEAL CAVITY, PERCUTANEOUS APPROACH: ICD-10-PCS | Performed by: RADIOLOGY

## 2020-10-08 PROCEDURE — 2580000003 HC RX 258: Performed by: INTERNAL MEDICINE

## 2020-10-08 PROCEDURE — 36430 TRANSFUSION BLD/BLD COMPNT: CPT

## 2020-10-08 PROCEDURE — 1210000000 HC MED SURG R&B

## 2020-10-08 PROCEDURE — 86923 COMPATIBILITY TEST ELECTRIC: CPT

## 2020-10-08 PROCEDURE — 85025 COMPLETE CBC W/AUTO DIFF WBC: CPT

## 2020-10-08 PROCEDURE — 99232 SBSQ HOSP IP/OBS MODERATE 35: CPT | Performed by: PHYSICIAN ASSISTANT

## 2020-10-08 PROCEDURE — 80076 HEPATIC FUNCTION PANEL: CPT

## 2020-10-08 PROCEDURE — 86850 RBC ANTIBODY SCREEN: CPT

## 2020-10-08 PROCEDURE — 86901 BLOOD TYPING SEROLOGIC RH(D): CPT

## 2020-10-08 PROCEDURE — 97116 GAIT TRAINING THERAPY: CPT

## 2020-10-08 PROCEDURE — 99232 SBSQ HOSP IP/OBS MODERATE 35: CPT | Performed by: INTERNAL MEDICINE

## 2020-10-08 RX ORDER — INSULIN GLARGINE 100 [IU]/ML
20 INJECTION, SOLUTION SUBCUTANEOUS
Status: DISCONTINUED | OUTPATIENT
Start: 2020-10-09 | End: 2020-10-09 | Stop reason: HOSPADM

## 2020-10-08 RX ORDER — 0.9 % SODIUM CHLORIDE 0.9 %
20 INTRAVENOUS SOLUTION INTRAVENOUS ONCE
Status: DISCONTINUED | OUTPATIENT
Start: 2020-10-08 | End: 2020-10-09 | Stop reason: HOSPADM

## 2020-10-08 RX ADMIN — TORSEMIDE 40 MG: 20 TABLET ORAL at 10:11

## 2020-10-08 RX ADMIN — AMLODIPINE BESYLATE 10 MG: 10 TABLET ORAL at 10:11

## 2020-10-08 RX ADMIN — HYDRALAZINE HYDROCHLORIDE 50 MG: 50 TABLET, FILM COATED ORAL at 21:29

## 2020-10-08 RX ADMIN — AZITHROMYCIN MONOHYDRATE 250 MG: 500 INJECTION, POWDER, LYOPHILIZED, FOR SOLUTION INTRAVENOUS at 02:39

## 2020-10-08 RX ADMIN — SODIUM CHLORIDE 200 MG: 9 INJECTION, SOLUTION INTRAVENOUS at 00:35

## 2020-10-08 RX ADMIN — LACTULOSE 20 G: 20 SOLUTION ORAL at 21:29

## 2020-10-08 RX ADMIN — CEFTRIAXONE SODIUM 1 G: 1 INJECTION, POWDER, FOR SOLUTION INTRAMUSCULAR; INTRAVENOUS at 01:48

## 2020-10-08 RX ADMIN — GUAIFENESIN 600 MG: 600 TABLET, EXTENDED RELEASE ORAL at 10:11

## 2020-10-08 RX ADMIN — LACTULOSE 20 G: 20 SOLUTION ORAL at 10:11

## 2020-10-08 RX ADMIN — HYDRALAZINE HYDROCHLORIDE 50 MG: 50 TABLET, FILM COATED ORAL at 13:39

## 2020-10-08 RX ADMIN — ENOXAPARIN SODIUM 30 MG: 30 INJECTION SUBCUTANEOUS at 10:10

## 2020-10-08 RX ADMIN — INSULIN GLARGINE 30 UNITS: 100 INJECTION, SOLUTION SUBCUTANEOUS at 13:35

## 2020-10-08 RX ADMIN — GUAIFENESIN 600 MG: 600 TABLET, EXTENDED RELEASE ORAL at 21:29

## 2020-10-08 RX ADMIN — HYDRALAZINE HYDROCHLORIDE 50 MG: 50 TABLET, FILM COATED ORAL at 05:43

## 2020-10-08 RX ADMIN — Medication 15 G: at 17:05

## 2020-10-08 RX ADMIN — ASPIRIN 81 MG: 81 TABLET, CHEWABLE ORAL at 10:11

## 2020-10-08 ASSESSMENT — ENCOUNTER SYMPTOMS
EYES NEGATIVE: 1
ABDOMINAL PAIN: 1
BACK PAIN: 0
PHOTOPHOBIA: 0
WHEEZING: 0
NAUSEA: 0
RESPIRATORY NEGATIVE: 1
DIARRHEA: 0
VOMITING: 0
COUGH: 0
SHORTNESS OF BREATH: 0
CONSTIPATION: 0
ABDOMINAL DISTENTION: 1

## 2020-10-08 ASSESSMENT — PAIN SCALES - GENERAL
PAINLEVEL_OUTOF10: 0

## 2020-10-08 NOTE — PROGRESS NOTES
Infectious Diseases Inpatient Progress Note          HISTORY OF PRESENT ILLNESS:  Follow up CAP, lateral hydronephrosis, metastatic cholangiocarcinoma with ascites status post paracentesis on IV Rocephin, well tolerated. Patient is more awake. Some intermittent confusion. No pain. Poor appetite. Very weak. Ascitic fluid was negative for malignancy. No cough or SOB, on RA  B legs swelling    Current Medications:     sodium chloride  20 mL Intravenous Once    insulin lispro  0-12 Units Subcutaneous TID WC    insulin lispro  0-6 Units Subcutaneous Nightly    torsemide  40 mg Oral Daily    insulin glargine  30 Units Subcutaneous Daily before lunch    insulin lispro  12 Units Subcutaneous TID AC    enoxaparin  30 mg Subcutaneous Daily    guaiFENesin  600 mg Oral BID    cefTRIAXone (ROCEPHIN) IV  1 g Intravenous Q24H    azithromycin  250 mg Intravenous Q24H    amLODIPine  10 mg Oral Daily    aspirin  81 mg Oral Daily    hydrALAZINE  50 mg Oral 3 times per day    lactulose  20 g Oral BID       Allergies:  Patient has no known allergies. Review of Systems   Cardiovascular: Positive for leg swelling. Gastrointestinal: Positive for abdominal distention. All other systems reviewed and are negative. Physical Exam  Vitals:    10/07/20 2033 10/07/20 2049 10/08/20 0114 10/08/20 0543   BP: (!) 137/53  (!) 131/51 (!) 137/58   Pulse:   130 125   Resp:   18 18   Temp:   98.8 °F (37.1 °C)    TempSrc:   Oral    SpO2:  98% 99% 98%   Weight:       Height:         General Appearance: alert, in no acute distress, on room air  Opens eyes briefly to verbal stimulation, disoriented to place  Skin: warm and dry, no rash. Head: normocephalic and atraumatic  Eyes: anicteric sclerae  ENT: oropharynx clear and moist with normal mucous membranes.  No oral thrush  Lungs: normal respiratory effort, diminished breath sounds bilateral bases, rhonchi or wheezing  Heart regular rhythm, murmur  Abdomen: soft, no tenderness, + distention with ascites  +2 B leg edema  No erythema, no tenderness      DATA:    Lab Results   Component Value Date    WBC 12.1 (H) 10/08/2020    HGB 6.9 (LL) 10/08/2020    HCT 21.2 (L) 10/08/2020    MCV 67.3 (L) 10/08/2020     10/08/2020     Lab Results   Component Value Date    CREATININE 2.83 (H) 10/08/2020    BUN 61 (H) 10/08/2020     (L) 10/08/2020    K 4.2 10/08/2020    CL 97 10/08/2020    CO2 18 (L) 10/08/2020       Hepatic Function Panel:  Lab Results   Component Value Date    ALKPHOS 372 10/08/2020    ALT 59 10/08/2020     10/08/2020    PROT 6.0 10/08/2020    BILITOT 0.6 10/08/2020    BILIDIR 0.5 10/08/2020    IBILI 0.1 10/08/2020    LABALBU 2.0 10/08/2020    LABALBU 2.7 07/13/2020       Microbiology:   No results for input(s): BC in the last 72 hours. No results for input(s): Eulogio Crumble in the last 72 hours.       IMPRESSION:    · community-acquired pneumonia  · Bilateral hydronephrosis  · cholangiocarcinoma with ascites and liver lesions, severe anemia  · High procalcitonin level makes it less likely to be a viral infection  · CKD stage 3    Patient Active Problem List   Diagnosis    Erythropoietin deficiency anemia    Chronic kidney disease, stage III (moderate)    Weakness    Uncontrolled type 2 diabetes mellitus with hyperglycemia (HCC)       PLAN:  · Continue IV Rocephin  · Change to PO Ceftin on D/C  · Follow-up with heme-onc and GI    Discussed with patient and spouse    Barrie Evans MD

## 2020-10-08 NOTE — PROGRESS NOTES
Nephrology Progress Note    Assessment/  71 yo lady with ckd stage 3-4 due to DM, HTN with newly diagnosed cholangio / liver cancer. Has ascites and fluid overload. S/p paracentesis w/ 2300 ml on 10/05. Sig swelling now on torsemide. Anemia. bp is ok. Renal function worsening likely due to catheter being removed, has b/l hydronephrosis. Has hypoalbuminemia     Plan/  - continue torsemide 40 mg QD  - replace palm catheter  - recheck BMP at 1 pm to see if function improving, if so ok to discharge from renal standpoint  - deferring anemia management to oncology       Patient Active Problem List:     Erythropoietin deficiency anemia     Chronic kidney disease, stage III (moderate)     Weakness     Uncontrolled type 2 diabetes mellitus with hyperglycemia ([facility] [address]      Subjective:  Admit Date: 10/4/2020    Interval History: renal function worse today, palm catheter fell out?  Not hypotensive, no nephrotoxic medications, plan was for discharge today, Na also worsening as well     Medications:  Scheduled Meds:   sodium chloride  20 mL Intravenous Once    insulin lispro  0-12 Units Subcutaneous TID WC    insulin lispro  0-6 Units Subcutaneous Nightly    torsemide  40 mg Oral Daily    insulin glargine  30 Units Subcutaneous Daily before lunch    insulin lispro  12 Units Subcutaneous TID AC    enoxaparin  30 mg Subcutaneous Daily    guaiFENesin  600 mg Oral BID    cefTRIAXone (ROCEPHIN) IV  1 g Intravenous Q24H    azithromycin  250 mg Intravenous Q24H    amLODIPine  10 mg Oral Daily    aspirin  81 mg Oral Daily    hydrALAZINE  50 mg Oral 3 times per day    lactulose  20 g Oral BID     Continuous Infusions:   dextrose         CBC:   Recent Labs     10/07/20  0521 10/08/20  0503   WBC 9.3 12.1*   HGB 6.9* 6.9*    204     CMP:    Recent Labs     10/06/20  0515 10/07/20  0521 10/08/20  0503   * 133* 126*   K 3.9 4.4 4.2    101 97   CO2 17* 19* 18*   BUN 47* 55* 61*   CREATININE 2.12* 2.27* 2.83* GLUCOSE 112* 252* 178*   CALCIUM 8.1* 7.8* 7.6*   LABGLOM 22.8* 21.1* 16.3*     Troponin: No results for input(s): TROPONINI in the last 72 hours. BNP: No results for input(s): BNP in the last 72 hours. INR:   Recent Labs     10/08/20  0503   INR 1.4     Lipids: No results for input(s): CHOL, LDLDIRECT, TRIG, HDL, AMYLASE, LIPASE in the last 72 hours. Liver:   Recent Labs     10/08/20  0503   *   ALT 59*   ALKPHOS 372*   PROT 6.0*   LABALBU 2.0*   BILITOT 0.6     Iron:    Recent Labs     10/07/20  0521   FERRITIN 1,100.0*     Urinalysis: No results for input(s): UA in the last 72 hours.     Objective:  Vitals: BP (!) 137/58   Pulse 125   Temp 98.8 °F (37.1 °C) (Oral)   Resp 18   Ht 5' 3\" (1.6 m)   Wt 143 lb (64.9 kg)   SpO2 98%   BMI 25.33 kg/m²    Wt Readings from Last 3 Encounters:   10/04/20 143 lb (64.9 kg)   12/19/19 126 lb (57.2 kg)   12/19/19 126 lb (57.2 kg)      24HR INTAKE/OUTPUT:  No intake or output data in the 24 hours ending 10/08/20 0951    General: alert, in no apparent distress, thin  HEENT: normocephalic, atraumatic, anicteric  Lungs: non-labored respirations, clear to auscultation bilaterally  Heart: regular rate and rhythm, no murmurs or rubs  Abdomen: soft, non-tender  Ext: no cyanosis, ++ BLE peripheral edema  Neuro: alert and oriented, no gross abnormalities      Electronically signed by Philippe Street MD

## 2020-10-08 NOTE — PROGRESS NOTES
Vascular and Interventional Radiology   Cassidy Katz. Jose Carlos Warren M.D. [facility]      Chief Complaint:   Chief Complaint   Patient presents with    Hyperglycemia     in 400's at home was given 10 units before leaving now 0     Fall        Subjective: Freda Villasenor is a 70 y.o. female with cholangiocarcinoma and ascites, post paracentesis. Feels much better today, sitting in chair, accompanied by , feels abdomen much better, denies any discomfort. Objective:   BP (!) 137/58   Pulse 125   Temp 98.8 °F (37.1 °C) (Oral)   Resp 18   Ht 5' 3\" (1.6 m)   Wt 143 lb (64.9 kg)   SpO2 98%   BMI 25.33 kg/m²     Physical:  thin,  alert and  not in acute distress    Normocephalic, without obvious abnormality, atraumatic    Neck supple. No adenopathy. Thyroid symmetric, normal size, and without nodularity    normal rate, regular rhythm, no murmurs and no gallops    chest clear, no wheezing, rales, normal symmetric air entry    Abdomen: soft, non distended, NBS    Lab:  Recent Labs     10/08/20  0503   WBC 12.1*   RBC 3.15*   HGB 6.9*   HCT 21.2*   MCV 67.3*   MCH 21.7*   MCHC 32.3*   RDW 21.4*          Recent Labs     10/06/20  0515 10/07/20  0521 10/08/20  0503   INR 1.2 1.3 1.4   PROTIME 14.9 16.0* 16.7*       Recent Labs     10/06/20  0515 10/07/20  0521 10/08/20  0503   CREATININE 2.12* 2.27* 2.83*       Assessment: Freda Villasenor is a 70 y.o. female with prior ascites due to cholangiocarcinoman post paracentesis. Plan: If fluid recurs, discussed with Dr. Jeremy Hui, patient may need permanent ascites drain placed. Will follow. Cassidy Katz.  Jose Carlos Warren M.D. [facility]  10/8/2020,  9:27 AM

## 2020-10-08 NOTE — PROGRESS NOTES
[facility] Respiratory Therapy Evaluation   Current Order:  COMBIVENT TID AND ALBUTEROL 2PUFFS Q2 PRN     Home Regimen: NONE      Ordering Physician: JUANY  Re-evaluation Date:  N/A     Diagnosis: PNEUMONIA      Patient Status: Stable / Unstable + Physician notified    The following MDI Criteria must be met in order to convert aerosol to MDI with spacer.  If unable to meet, MDI will be converted to aerosol:  []  Patient able to demonstrate the ability to use MDI effectively  []  Patient alert and cooperative  []  Patient able to take deep breath with 5-10 second hold  []  Medication(s) available in this delivery method   []  Peak flow greater than or equal to 200 ml/min            Current Order Substituted To  (same drug, same frequency)   Aerosol to MDI [] Albuterol Sulfate 0.083% unit dose by aerosol Albuterol Sulfate MDI 2 puffs by inhalation with spacer    [] Levalbuterol 1.25 mg unit dose by aerosol Levalbuterol MDI 2 puffs by inhalation with spacer    [] Levalbuterol 0.63 mg unit dose by aerosol Levalbuterol MDI 2 puffs by inhalation with spacer    [] Ipratropium Bromide 0.02% unit dose by aerosol Ipratropium Bromide MDI 2 puffs by inhalation with spacer    [] Duoneb (Ipratropium + Albuterol) unit dose by aerosol Ipratropium MDI + Albuterol MDI 2 puffs by inhalation w/spacer   MDI to Aerosol [] Albuterol Sulfate MDI Albuterol Sulfate 0.083% unit dose by aerosol    [] Levalbuterol MDI 2 puffs by inhalation Levalbuterol 1.25 mg unit dose by aerosol    [] Ipratropium Bromide MDI by inhalation Ipratropium Bromide 0.02% unit dose by aerosol    [] Combivent (Ipratropium + Albuterol) MDI by inhalation Duoneb (Ipratropium + Albuterol) unit dose by aerosol   Treatment Assessment [Frequency/Schedule]:  Change frequency to: _____COMBIVENT 1 PUFF Q4 PRN_____________________________________________per Protocol, P&T, MEC      Points 0 1 2 3 4   Pulmonary Status  Non-Smoker  [x]   Smoking history   < 20 pack years  [] Smoking history  ?  20 pack years  []   Pulmonary Disorder  (acute or chronic)  []   Severe or Chronic w/ Exacerbation  []     Surgical Status No [x]   Surgeries     General []   Surgery Lower []   Abdominal Thoracic or []   Upper Abdominal Thoracic with  PulmonaryDisorder  []     Chest X-ray Clear/Not  Ordered     [x]  Chronic Changes  Results Pending  []  Infiltrates, atelectasis, pleural effusion, or edema  []  Infiltrates in more than one lobe []  Infiltrate + Atelectasis, &/or pleural effusion  []    Respiratory Pattern Regular,  RR = 12-20 [x]  Increased,  RR = 21-25 []  EDWARDS, irregular,  or RR = 26-30 []  Decreased FEV1  or RR = 31-35 []  Severe SOB, use  of accessory muscles, or RR ? 35  []    Mental Status Alert, oriented,  Cooperative [x]  Confused but Follows commands []  Lethargic or unable to follow commands []  Obtunded  []  Comatose  []    Breath Sounds Clear to  auscultation  []  Decreased unilaterally or  in bases only []  Decreased  bilaterally  [x]  Crackles or intermittent wheezes []  Wheezes []    Cough Strong, Spontan., & nonproductive [x]  Strong,  spontaneous, &  productive []  Weak,  Nonproductive []  Weak, productive or  with wheezes []  No spontaneous  cough or may require suctioning []    Level of Activity Ambulatory []  Ambulatory w/ Assist  [x]  Non-ambulatory []  Paraplegic []  Quadriplegic []    Total    Score:___3____     Triage Score:__5______      Tri       Triage:     1. (>20) Freq: Q3    2. (16-20) Freq: Q4   3. (11-15) Freq: QID & Albuterol Q2 PRN    4. (6-10) Freq: TID & Albuterol Q2 PRN    5. (0-5) Freq Q4prn

## 2020-10-08 NOTE — PROGRESS NOTES
Progress Note  Date:10/7/2020       [facility]:[id number]  Patient Name:Jaye Lynn     YOB: 1945     Age:75 y.o. Chief complaint uncontrolled diabetes    Subjective    Subjective:  Symptoms:  Improved. She reports malaise and weakness. Diet:  Adequate intake. Activity level: Impaired due to weakness. Review of Systems   Neurological: Positive for weakness. All other systems reviewed and are negative. Objective         Vitals Last 24 Hours:  TEMPERATURE:  Temp  Av.1 °F (37.3 °C)  Min: 98.4 °F (36.9 °C)  Max: 100 °F (37.8 °C)  RESPIRATIONS RANGE: Resp  Av  Min: 16  Max: 18  PULSE OXIMETRY RANGE: SpO2  Av.5 %  Min: 95 %  Max: 100 %  PULSE RANGE: Pulse  Av  Min: 113  Max: 127  BLOOD PRESSURE RANGE: Systolic (85FRG), EZB:330 , Min:114 , RRE:090   ; Diastolic (11ANV), CIQ:43, Min:44, Max:56    I/O (24Hr): Intake/Output Summary (Last 24 hours) at 10/7/2020 2157  Last data filed at 10/6/2020 2247  Gross per 24 hour   Intake --   Output 200 ml   Net -200 ml     Objective:  General Appearance:  Comfortable. Vital signs: (most recent): Blood pressure (!) 137/53, pulse 127, temperature 98.4 °F (36.9 °C), temperature source Oral, resp. rate 18, height 5' 3\" (1.6 m), weight 143 lb (64.9 kg), SpO2 100 %. Vital signs are normal.    HEENT: Normal HEENT exam.    Lungs:  Normal effort. Heart: Normal rate. Abdomen: Abdomen is distended. Extremities: Normal range of motion. There is dependent edema. Neurological: Patient is alert.       Labs/Imaging/Diagnostics    Labs:  CBC:  Recent Labs     10/04/20  2245 10/04/20  2342 10/06/20  0515 10/07/20  0521   WBC 7.0  --  7.7 9.3   RBC 3.65*  --  3.34* 3.20*   HGB 8.0* 9.0* 7.4* 6.9*   HCT 24.5*  --  22.5* 21.7*   MCV 67.1*  --  67.4* 67.9*   RDW 20.7*  --  20.7* 21.3*     --  199 196     CHEMISTRIES:  Recent Labs     10/04/20  2245  10/05/20  0525 10/06/20  0515 10/07/20  0521   *  --  132* 132* 133*   K 0.6 mg/dL 0.2           Imaging Last 24 Hours:  No results found. Assessment//Plan           Hospital Problems           Last Modified POA    Weakness 10/5/2020 Yes    Uncontrolled type 2 diabetes mellitus with hyperglycemia ([facility] [address] 10/6/2020 Yes        Assessment:    Condition: In serious condition. Improving. (Uncontrolled type 2 diabetes blood sugars have been going up p.o. intake has improved  Chronic kidney disease  Cholangiocarcinoma  Ascites  Hepatic encephalopathy). Plan:   (Increase Lantus to 30 units at lunchtime increase Humalog to 12 units with meals plus Humalog coverage monitor glycemic control closely continue other supportive measures).        Electronically signed by Nubia Garcia MD on 10/7/20 at 9:57 PM EDT

## 2020-10-08 NOTE — PROGRESS NOTES
Physical Therapy Med Surg Daily Treatment Note  [id number]       NAME: Matilde Nobles  : 1945 (70 y.o.)  MRN: [id number]  CODE STATUS: Full Code    Date of Service: 10/8/2020    Patient Diagnosis(es): Weakness [R53.1]   Chief Complaint   Patient presents with    Hyperglycemia     in 400's at home was given 10 units before leaving now 0     Fall     Patient Active Problem List    Diagnosis Date Noted    Uncontrolled type 2 diabetes mellitus with hyperglycemia ([facility] [place] [address]     Weakness 10/05/2020    Chronic kidney disease, stage III (moderate) 2019    Erythropoietin deficiency anemia 2019        Past Medical History:   Diagnosis Date    Depression     Hypertension      Past Surgical History:   Procedure Laterality Date    PARACENTESIS Left 10/06/2020    3330 cc by Dr. Tay Potter.        Restrictions  Restrictions/Precautions: Fall Risk    SUBJECTIVE   General  Chart Reviewed: Yes  Family / Caregiver Present: Yes(dtg)  Subjective  Subjective: id like to get up  General Comment  Comments: no c/o dizziness . hgb 6.9 nsg said tx was okay and pt agreeable    Pre-Session Pain Report     Pain Screening  Patient Currently in Pain: No  Pre Treatment Pain Screening  Pain at present: 0  Scale Used: Numeric Score  Intervention List: Patient able to continue with treatment    Post-Session Pain Report  Pain Assessment  Pain Assessment: 0-10  Pain Level: 0         OBJECTIVE        Bed mobility  Rolling to Left: Stand by assistance  Rolling to Right: Stand by assistance  Supine to Sit: Stand by assistance  Sit to Supine: Stand by assistance  Scooting: Stand by assistance    Transfers  Sit to Stand: Contact guard assistance;Minimal Assistance  Stand to sit: Contact guard assistance;Minimal Assistance  Bed to Chair: Contact guard assistance;Minimal assistance  Comment: assistance needed to guide hips to chair, increased lateral sway and some retro LOB, pt impulsive, pt slightly unsteady. Ambulation  Ambulation?: Yes  More Ambulation?: No  Ambulation 1  Surface: level tile  Device: Rolling Walker  Assistance: Minimal assistance;Contact guard assistance  Quality of Gait: Poor management of [employer]. Shuffling steps at times. Steadying Kan required to correct for balance impairments, delayed righting reactions, inconsistent darlyn, unsafe turns, increased lateral sway, deviation from straight path  Distance: 30'x2 with two turns  Comments: VC for pt to slow down and use [employer] appropriately. Activity Tolerance  Activity Tolerance: Patient Tolerated treatment well;Patient limited by fatigue          ASSESSMENT     Pt demonstrated impulsive behaviors during gait and transfers despite multiple VC and safety cues. Pt was fatigued following gait and returned to bed. Discharge Recommendations:  Continue to assess pending progress, Patient would benefit from continued therapy after discharge    Goals  Short term goals  Short term goal 1: Pt to complete HEP with supervision  Long term goals  Long term goal 1: Pt to complete bed mobility with indep  Long term goal 2: Pt to complete transfers with supervision  Long term goal 3: Pt to ambulate with LRD 100ft and supervision    PLAN    Times per week: 3-6  Safety Devices  Type of devices: All fall risk precautions in place, Bed alarm in place, Left in bed, Call light within reach, Nurse notified     [facility] (6 CLICK) [address] Mobility Raw Score : 17     Therapy Time   Individual   Time In 1037   Time Out 1052   Minutes 15         gait:10  Transfer/bm: Erlin 82, PTA, 10/08/20 at 10:57 AM         Definitions for assistance levels  Independent = pt does not require any physical supervision or assistance from another person for activity completion. Device may be needed.   Stand by assistance = pt requires verbal cues or instructions from another person, close to but not touching,

## 2020-10-08 NOTE — PROGRESS NOTES
Hematology/Oncology  Attending Progress Note        CHIEF COMPLAINT/HPI: Pt has less abd discomfort after paracentesis; feels tired; no gross bleeding; pt received IV Venofer last night. U/A showed microscopic hematuria. REVIEW OF SYSTEMS:    Unremarkable except for symptoms mentioned in HPI.     Current Inpatient Medications:    Current Facility-Administered Medications: albuterol-ipratropium (COMBIVENT RESPIMAT)  MCG/ACT inhaler 1 puff, 1 puff, Inhalation, Q4H PRN  0.9 % sodium chloride bolus, 20 mL, Intravenous, Once  insulin lispro (HUMALOG) injection vial 0-12 Units, 0-12 Units, Subcutaneous, TID WC  insulin lispro (HUMALOG) injection vial 0-6 Units, 0-6 Units, Subcutaneous, Nightly  torsemide (DEMADEX) tablet 40 mg, 40 mg, Oral, Daily  insulin glargine (LANTUS) injection vial 30 Units, 30 Units, Subcutaneous, Daily before lunch  insulin lispro (HUMALOG) injection vial 12 Units, 12 Units, Subcutaneous, TID AC  polyethylene glycol (GLYCOLAX) packet 17 g, 17 g, Oral, Daily PRN  promethazine (PHENERGAN) tablet 12.5 mg, 12.5 mg, Oral, Q6H PRN **OR** ondansetron (ZOFRAN) injection 4 mg, 4 mg, Intravenous, Q6H PRN  enoxaparin (LOVENOX) injection 30 mg, 30 mg, Subcutaneous, Daily  labetalol (NORMODYNE;TRANDATE) injection 10 mg, 10 mg, Intravenous, Q4H PRN  glucose (GLUTOSE) 40 % oral gel 15 g, 15 g, Oral, PRN  dextrose 50 % IV solution, 12.5 g, Intravenous, PRN  glucagon (rDNA) injection 1 mg, 1 mg, Intramuscular, PRN  dextrose 5 % solution, 100 mL/hr, Intravenous, PRN  guaiFENesin (MUCINEX) extended release tablet 600 mg, 600 mg, Oral, BID  cefTRIAXone (ROCEPHIN) 1 g IVPB in 50 mL D5W minibag, 1 g, Intravenous, Q24H  azithromycin (ZITHROMAX) 250 mg in dextrose 5 % 250 mL IVPB, 250 mg, Intravenous, Q24H  amLODIPine (NORVASC) tablet 10 mg, 10 mg, Oral, Daily  aspirin chewable tablet 81 mg, 81 mg, Oral, Daily  hydrALAZINE (APRESOLINE) tablet 50 mg, 50 mg, Oral, 3 times per day  lactulose (CHRONULAC) 10 GM/15ML solution 20 g, 20 g, Oral, BID  iopamidol (ISOVUE-300) 61 % injection 100 mL, 100 mL, Intravenous, ONCE PRN    PHYSICAL EXAM:    VITALS:  BP (!) 137/58   Pulse 125   Temp 98.8 °F (37.1 °C) (Oral)   Resp 18   Ht 5' 3\" (1.6 m)   Wt 143 lb (64.9 kg)   SpO2 98%   BMI 25.33 kg/m²   INTAKE/OUTPUT:  No intake or output data in the 24 hours ending 10/08/20 1128  CONSTITUTIONAL:  awake, alert, cooperative, no apparent distress, and appears stated age  EYES: pale conjunctivae; anicteric sclerae  ENT:  No epistaxis  NECK:  Supple, symmetrical, trachea midline, no adenopathy,   LUNGS:  No increased work of breathing, good air exchange, clear to auscultation bilaterally, no crackles or wheezing  CARDIOVASCULAR:  Normal HS;  regular rate and rhythm,   ABDOMEN: soft, less distended, non-tender,no masses palpated,no hepatosplenomegaly  MUSCULOSKELETAL:  There is no pedal edema; no calf tenderness  NEUROLOGIC:  Awake, alert, oriented to name, place and time.   Non-focal  SKIN:  no bruising or bleeding and no rashes    DATA:      PT/INR:  No results found for: PTINR  PTT:  No results found for: APTT  CMP:    Lab Results   Component Value Date     10/08/2020    K 4.2 10/08/2020    CL 97 10/08/2020    CO2 18 10/08/2020    BUN 61 10/08/2020    PROT 6.0 10/08/2020     Magnesium:    Lab Results   Component Value Date    MG 1.9 10/04/2020     Phosphorus:  No components found for: PO4  Calcium:  No components found for: CA  CBC:    Lab Results   Component Value Date    WBC 12.1 10/08/2020    RBC 3.15 10/08/2020    HGB 6.9 10/08/2020    HCT 21.2 10/08/2020    MCV 67.3 10/08/2020    RDW 21.4 10/08/2020     10/08/2020     DIFF:    Lab Results   Component Value Date    MCV 67.3 10/08/2020    RDW 21.4 10/08/2020      LDH:  No results found for: LDH  Uric Acid:  No components found for: URIC    CBC with Differential:    Lab Results   Component Value Date    WBC 12.1 10/08/2020    RBC 3.15 10/08/2020    HGB 6.9 10/08/2020    HCT domes of the diaphragm to the symphysis pubis. Sagittal and coronal reconstructions were also obtained. Oral contrast medium:  None. Intravenous contrast medium:  None. Comparison:  None. Findings: Study limited secondary to lack of intravenous and oral contrast media. Lungs:  Small bilateral pleural effusions with dependent reticular and subsegmental atelectatic bilaterally, greater on right. Liver:  Normal in size, shape, and attenuation. Small in size and nodular in contour. Multiple ill-defined and rounded areas of decreased attenuation throughout the liver. Largest area affected is within the right hepatic lobe with low attenuation foci measuring up to 4 x 6 cm. Bile Ducts:  Normal in caliber. Gallbladder:  No stones or wall thickening. Pancreas:  Normal without masses, cysts, ductal dilatation or calcification. Spleen:  Normal in size without masses or calcifications. No splenules. Kidneys:  Normal in size. Moderate right, with mild to moderate left pelvocaliectasis. Renal vascular calcification identified bilaterally with possible superimposed bilateral renal calculi measuring up to 2 mm. 2 cm cyst lateral midpole left kidney. Adrenals:  Normal. Small bowel:  Normal in caliber. Not well visualized secondary to lack of opacification. Appendix:  Not visualized. Colon:  Normal in caliber. Peritoneum:  No free air. Free fluid is identified within the right and anterior perihepatic spaces, subphrenic and perisplenic spaces, mesentery, Morison's pouch and bilateral paracolic gutters, and continuing to extending caudally into the pelvis. Vessels: Aorta normal in course and caliber. Probable bilateral femoral-popliteal bypass grafts. Lymph nodes:  Retroperitoneal:  No enlarged retroperitoneal lymph nodes. Mesenteric:  No enlarged mesenteric lymph nodes. Pelvic: No enlarged pelvic lymph nodes. Ureters: Not well visualized. Bladder: No wall thickening. Reproductive organs: No pelvic masses.  Abdominal Wall:  No XR CHEST PORTABLE CLINICAL HISTORY: COUGH, SHORTNESS OF BREATH, ALTERED MENTAL STATUS COMPARISONS: None available. FINDINGS: Osseous structures intact. Cardiopericardial silhouette normal. Aorta calcified. Right diaphragm elevated. Pulmonary vasculature indistinct. Ill-defined areas increase opacity at lung bases, greater on right. BILATERAL LOWER LUNG ATELECTASIS/PNEUMONIA VERSUS EDEMA. Us Dup Abd Pel Retro Scrot Complete    Result Date: 10/5/2020  EXAMINATION: Duplex liver Doppler CLINICAL HISTORY: Abnormal liver function studies. Abnormal CT. FINDINGS: Arterial duplex of the liver performed. . The study also evaluated the liver which was extremely heterogeneous in echotexture. There is ascites. There were poorly defined areas of mixed echogenicity in the right lobe of the liver with nondescript borders. A focal mass or confluence of multiple masses within the liver suspected. In conglomeration these measured 6.5 x 8.5 cm in the central right lobe of the liver. Liver tumor suspected either metastatic or primary. Occasionally fatty liver and cirrhosis can have an appearance such as this and therefore confirmation with contrast-enhanced cross-sectional imaging (CT or MRI) recommended to confirm this, if not noted on outside studies. There was flow in the splenic vein to the level of the confluence. The portal vein at this level showed no flow. The vessel itself was small in caliber measuring 8 mm in diameter. No flow in the branches of the portal vein within the liver demonstrated. Arterial flow in the hepatic artery has elevated systolic velocity amplitude. Left hepatic artery systolic velocity is 397 cm/s. Flow in the common hepatic artery is 111 cm per sec. There is normal phasicity and waveform in the hepatic veins within the liver and in the IVC. The spleen was not evaluated. CHRONIC PORTAL VEIN THROMBOSIS.  LIVER MASSES SUSPECTED CONSISTENT WITH MULTICENTRIC HEPATOCELLULAR CARCINOMA VERSUS METASTATIC DISEASE. CONFIRM WITH FURTHER CROSS-SECTIONAL IMAGING AS DISCUSSED IN THE REPORT. Us Guided Paracentesis    1. Status post technically successful ultrasound-guided paracentesis. Jessica Perera is a Female of 70 years age, referred for Ultrasound Guided Paracentesis. PROCEDURE: Survey of the abdomen showed large amount of ascites fluid. After obtaining informed consent, the patient was positioned supine on the sonography table. Using ultrasound, the skin over the left hemiabdomen was locally anesthetized with 1% lidocaine. Following that, a Yueh needle was advanced into the fluid pocket using ultrasound visualization. 2330cc, of slightly turbid pink fluid were aspirated and sent for cytology, and pathology. The needle was removed, and hemostasis was obtained with pressure. A Band-Aid was placed. Post procedure images did not demonstrate hemorrhage at the target site. The patient tolerated the procedure well. The patient left the department in good condition. A radiology nurse was in presence monitoring vital signs, assisting throughout the procedure. Xr Hip 2-3 Vw W Pelvis Left    Result Date: 10/5/2020  EXAMINATION: AP pelvis left hip. 3 films CLINICAL HISTORY: Trauma. Posterior left hip pain FINDINGS: AP pelvis and hip and a shoot through lateral left hip performed. The bone is adequately mineralized and grossly intact. No fracture or dislocation. Moderate degenerative changes overlie the hip joints bilaterally. Mild degenerative changes in the lower lumbar spine. Vascular calcifications noted in the pelvis and proximal thighs bilaterally. Impression: 1. Anemia due to microscopic hematuria noted in urinalysis  with associated Fe deficien. Her H/H did not improve after   IV Venofer. Pt will be given RBC transfusion today  2.   CholangioCA   with multiple liver masses  - pt to follow-up with her oncologist at [facility] after discharge from [id number] [facility]  to discuss   chemotx  and whether there is role for possible surgery . 3. Abdominal distension and ascites r/o due to mets from Cholangio CA    R/o ascites from Liver cirrhosis. Check peritoneal fluid cytology report  4.  Liver cirrhosis                                                                                               Electronically signed by Danilo Ballard MD on 10/8/20 at 11:28 AM EDT

## 2020-10-08 NOTE — FLOWSHEET NOTE
Hanks cath inserted w/o difficulty, yellow urine returned. Tolerated well. Uterus  Protruding from vagina, pink, cervix visualized, cyst like ashlee present on uterus. No Bleeding.

## 2020-10-08 NOTE — PLAN OF CARE
Problem: Skin Integrity:  Goal: Will show no infection signs and symptoms  Description: Will show no infection signs and symptoms  Outcome: Ongoing  Goal: Absence of new skin breakdown  Description: Absence of new skin breakdown  Outcome: Ongoing     Problem: Falls - Risk of:  Goal: Will remain free from falls  Description: Will remain free from falls  Outcome: Ongoing  Goal: Absence of physical injury  Description: Absence of physical injury  Outcome: Ongoing     Problem: Confusion - Acute:  Goal: Absence of continued neurological deterioration signs and symptoms  Description: Absence of continued neurological deterioration signs and symptoms  Outcome: Ongoing  Goal: Mental status will be restored to baseline  Description: Mental status will be restored to baseline  Outcome: Ongoing     Problem: Discharge Planning:  Goal: Ability to perform activities of daily living will improve  Description: Ability to perform activities of daily living will improve  Outcome: Ongoing  Goal: Participates in care planning  Description: Participates in care planning  Outcome: Ongoing  Goal: Discharged to appropriate level of care  Description: Discharged to appropriate level of care  Outcome: Ongoing     Problem: Injury - Risk of, Physical Injury:  Goal: Will remain free from falls  Description: Will remain free from falls  Outcome: Ongoing  Goal: Absence of physical injury  Description: Absence of physical injury  Outcome: Ongoing     Problem: Mood - Altered:  Goal: Mood stable  Description: Mood stable  Outcome: Ongoing  Goal: Absence of abusive behavior  Description: Absence of abusive behavior  Outcome: Ongoing  Goal: Verbalizations of feeling emotionally comfortable while being cared for will increase  Description: Verbalizations of feeling emotionally comfortable while being cared for will increase  Outcome: Ongoing     Problem: Psychomotor Activity - Altered:  Goal: Absence of psychomotor disturbance signs and symptoms  Description: Absence of psychomotor disturbance signs and symptoms  Outcome: Ongoing     Problem: Sensory Perception - Impaired:  Goal: Demonstrations of improved sensory functioning will increase  Description: Demonstrations of improved sensory functioning will increase  Outcome: Ongoing  Goal: Decrease in sensory misperception frequency  Description: Decrease in sensory misperception frequency  Outcome: Ongoing  Goal: Able to refrain from responding to false sensory perceptions  Description: Able to refrain from responding to false sensory perceptions  Outcome: Ongoing  Goal: Demonstrates accurate environmental perceptions  Description: Demonstrates accurate environmental perceptions  Outcome: Ongoing  Goal: Able to distinguish between reality-based and nonreality-based thinking  Description: Able to distinguish between reality-based and nonreality-based thinking  Outcome: Ongoing  Goal: Able to interrupt nonreality-based thinking  Description: Able to interrupt nonreality-based thinking  Outcome: Ongoing  Goal: Ability to maintain a stable neurologic state will improve  Description: Ability to maintain a stable neurologic state will improve  Outcome: Ongoing     Problem: Sleep Pattern Disturbance:  Goal: Appears well-rested  Description: Appears well-rested  Outcome: Ongoing     Problem: Mobility - Impaired:  Goal: Mobility will improve  Description: Mobility will improve  Outcome: Ongoing     Problem: Serum Glucose Level - Abnormal:  Goal: Ability to maintain appropriate glucose levels will improve  Description: Ability to maintain appropriate glucose levels will improve  Outcome: Ongoing

## 2020-10-08 NOTE — CONSULTS
CC: Ascites    HPI:  Patient is a pleasant 77-year-old woman who presented to hospital with acute confusion, and hyperglycemia. 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 She's been followed by Dr. Percy Chery from oncology. Patient also experiencing distention of the abdomen with ascites. Interventional radiology was consulted for a diagnostic and therapeutic paracentesis. Family History   Problem Relation Age of Onset    Cancer Mother         stomach    Cancer Father         throat    Arthritis Neg Hx     Asthma Neg Hx     Birth Defects Neg Hx     Depression Neg Hx     Diabetes Neg Hx     Early Death Neg Hx     Hearing Loss Neg Hx     Heart Disease Neg Hx     High Blood Pressure Neg Hx     High Cholesterol Neg Hx     Kidney Disease Neg Hx     Learning Disabilities Neg Hx     Mental Illness Neg Hx     Mental Retardation Neg Hx     Miscarriages / Stillbirths Neg Hx     Stroke Neg Hx     Substance Abuse Neg Hx     Vision Loss Neg Hx     Other Neg Hx        Past Surgical History:   Procedure Laterality Date    PARACENTESIS Left 10/06/2020    3330 cc by Dr. Mayuri Fong.         Past Medical History:   Diagnosis Date    Depression     Hypertension        Social History     Socioeconomic History    Marital status:      Spouse name: None    Number of children: None    Years of education: None    Highest education level: None   Occupational History    None   Social Needs    Financial resource strain: None    Food insecurity     Worry: None     Inability: None    Transportation needs     Medical: None     Non-medical: None   Tobacco Use    Smoking status: Never Smoker   Substance and Sexual Activity    Alcohol use: No    Drug use: No    Sexual activity: Yes     Partners: Male   Lifestyle    Physical activity     Days per week: None     Minutes per session: None    Stress: None   Relationships    Social connections     Talks on phone: None     Gets together: None     Attends Yazdanism service: None     Active member of club or organization: None     Attends meetings of clubs or organizations: None     Relationship status: None    Intimate partner violence     Fear of current or ex partner: None     Emotionally abused: None     Physically abused: None     Forced sexual activity: None   Other Topics Concern    None   Social History Narrative    None       No Known Allergies    No current facility-administered medications on file prior to encounter. Current Outpatient Medications on File Prior to Encounter   Medication Sig Dispense Refill    amLODIPine (NORVASC) 10 MG tablet Take 10 mg by mouth daily      insulin lispro (HUMALOG) 100 UNIT/ML injection vial Inject 10 Units into the skin 3 times daily (before meals)      aspirin 81 MG tablet Take 81 mg by mouth daily         Review of Systems   Constitutional: Positive for appetite change and fatigue. Negative for chills, diaphoresis and fever. HENT: Negative. Negative for congestion, ear pain, hearing loss and tinnitus. Eyes: Negative. Negative for photophobia. Respiratory: Negative. Negative for cough, shortness of breath and wheezing. Cardiovascular: Negative. Negative for chest pain, palpitations and leg swelling. Gastrointestinal: Positive for abdominal distention and abdominal pain. Negative for constipation, diarrhea, nausea and vomiting. Genitourinary: Negative. Negative for dysuria, flank pain, frequency, hematuria and urgency. Musculoskeletal: Negative. Negative for back pain and neck pain. Skin: Negative. Negative for rash. Allergic/Immunologic: Negative for environmental allergies. Neurological: Positive for weakness. Negative for dizziness, tremors and headaches. Hematological: Does not bruise/bleed easily. Psychiatric/Behavioral: Negative. Negative for hallucinations and suicidal ideas. The patient is not nervous/anxious.         OBJECTIVE:  BP (!) 137/58 Pulse 125   Temp 98.8 °F (37.1 °C) (Oral)   Resp 18   Ht 5' 3\" (1.6 m)   Wt 143 lb (64.9 kg)   SpO2 98%   BMI 25.33 kg/m²     Physical Exam  Constitutional:       General: She is not in acute distress. Appearance: She is well-developed. She is ill-appearing. She is not diaphoretic. HENT:      Head: Normocephalic and atraumatic. Nose: Nose normal.      Mouth/Throat:      Pharynx: No oropharyngeal exudate. Eyes:      General: No scleral icterus. Right eye: No discharge. Left eye: No discharge. Conjunctiva/sclera: Conjunctivae normal.   Neck:      Musculoskeletal: Neck supple. Thyroid: No thyromegaly. Vascular: No JVD. Trachea: No tracheal deviation. Cardiovascular:      Rate and Rhythm: Normal rate and regular rhythm. Heart sounds: Normal heart sounds. No murmur. No friction rub. No gallop. Pulmonary:      Effort: No respiratory distress. Breath sounds: No stridor. No wheezing or rales. Chest:      Chest wall: No tenderness. Abdominal:      General: Bowel sounds are normal. There is distension. Palpations: There is no mass. Tenderness: There is abdominal tenderness. There is no guarding or rebound. Hernia: No hernia is present. Musculoskeletal:         General: No tenderness or deformity. Skin:     General: Skin is dry. Coloration: Skin is not pale. Findings: No erythema or rash. Neurological:      Mental Status: She is lethargic. Cranial Nerves: No cranial nerve deficit.          ASSESSMENT ANDPLAN:    ASSESSMENT: Ascites and the presence of recent diagnosis of cholangiocarcinoma    PLAN: Diagnostic and therapeutic ultrasound-guided paracentesis    TEDDY Samuel

## 2020-10-08 NOTE — PROGRESS NOTES
Endocrinology Progress Note    Assessment and Plan:   Assessment-  1. Type 2 diabetes  2. Cholangiocarcinoma  3. Ascites  4. CRF   5. Anemia    Plan-  1. Decrease Lantus 20 units daily before lunch hold if glucose less than 120  2. Humalog 12 units 3 times daily before meals hold if glucose less than 120 or patient is not eating  3. Medium dose sliding scale coverage  4. Monitor glycemic control closely  5. Avoid hypoglycemia  6. Blood transfusions per hematology      POC Glucose:   Recent Labs     10/07/20  2024 10/08/20  0824 10/08/20  1133 10/08/20  1656 10/08/20  1721   POCGLU 349* 205* 245* 46* 57*     HGBA1C:  Lab Results   Component Value Date    LABA1C 10.0 (H) 10/06/2020    LABA1C 10.5 (H) 10/05/2020     CBC:   Recent Labs     10/07/20  0521 10/08/20  0503   WBC 9.3 12.1*   HGB 6.9* 6.9*    204     CMP:    Recent Labs     10/07/20  0521 10/08/20  0503 10/08/20  1224   * 126* 126*   K 4.4 4.2 4.8    97 95   CO2 19* 18* 16*   BUN 55* 61* 60*   CREATININE 2.27* 2.83* 2.90*   GLUCOSE 252* 178* 125*   CALCIUM 7.8* 7.6* 7.9*   LABGLOM 21.1* 16.3* 15.9*         CC:   Chief Complaint   Patient presents with    Hyperglycemia     in 400's at home was given 10 units before leaving now 276     Fall       Subjective:    Interval History: Patient is a 79-year-old type 2 insulin-dependent diabetic female    Review of systems: denies polyuria, polydipsia, ABD pain, flank pain, N/V/D, or diaphoresis  Medications:   Scheduled Meds:   sodium chloride  20 mL Intravenous Once    [START ON 10/9/2020] insulin glargine  20 Units Subcutaneous Daily before lunch    insulin lispro  0-12 Units Subcutaneous TID WC    insulin lispro  0-6 Units Subcutaneous Nightly    torsemide  40 mg Oral Daily    insulin lispro  12 Units Subcutaneous TID AC    enoxaparin  30 mg Subcutaneous Daily    guaiFENesin  600 mg Oral BID    cefTRIAXone (ROCEPHIN) IV  1 g Intravenous Q24H    azithromycin  250 mg Intravenous Q24H    amLODIPine  10 mg Oral Daily    aspirin  81 mg Oral Daily    hydrALAZINE  50 mg Oral 3 times per day    lactulose  20 g Oral BID     Continuous Infusions:   dextrose         Objective:   Vitals: BP (!) 107/40   Pulse 115   Temp 98.4 °F (36.9 °C)   Resp 16   Ht 5' 3\" (1.6 m)   Wt 143 lb (64.9 kg)   SpO2 98%   BMI 25.33 kg/m²    Wt Readings from Last 3 Encounters:   10/04/20 143 lb (64.9 kg)   12/19/19 126 lb (57.2 kg)   12/19/19 126 lb (57.2 kg)        General appearance: appears older than stated age, cooperative and no distress  Skin: Skin color, texture, turgor normal. No rashes or lesions. Neck: no lymphadenopathy  Lungs: clear to auscultation bilaterally  Heart: regular rate and rhythm, S1, S2 normal, no murmur, click, rub or gallop  Abdomen: soft, non-tender. Bowel sounds normal. No masses,  no organomegaly.   Extremities: extremities normal, atraumatic, no cyanosis or edema    Patient Active Problem List:     Erythropoietin deficiency anemia     Chronic kidney disease, stage III (moderate)     Weakness     Uncontrolled type 2 diabetes mellitus with hyperglycemia ([facility] [address]     Pneumonia of right lower lobe due to infectious organism     Hydronephrosis            Electronically signed by SANTHOSH Leal on 10/8/2020 at 5:47 PM

## 2020-10-09 VITALS
SYSTOLIC BLOOD PRESSURE: 138 MMHG | DIASTOLIC BLOOD PRESSURE: 61 MMHG | WEIGHT: 143 LBS | TEMPERATURE: 98.2 F | OXYGEN SATURATION: 99 % | HEIGHT: 63 IN | RESPIRATION RATE: 18 BRPM | HEART RATE: 115 BPM | BODY MASS INDEX: 25.34 KG/M2

## 2020-10-09 LAB
ALBUMIN SERPL-MCNC: 1.9 G/DL (ref 3.5–4.6)
ALP BLD-CCNC: 363 U/L (ref 40–130)
ALT SERPL-CCNC: 52 U/L (ref 0–33)
AMMONIA: 71 UMOL/L (ref 11–51)
ANION GAP SERPL CALCULATED.3IONS-SCNC: 15 MEQ/L (ref 9–15)
ANISOCYTOSIS: ABNORMAL
AST SERPL-CCNC: 78 U/L (ref 0–35)
BASOPHILS ABSOLUTE: 0 K/UL (ref 0–0.2)
BASOPHILS RELATIVE PERCENT: 0.5 %
BILIRUB SERPL-MCNC: 0.9 MG/DL (ref 0.2–0.7)
BILIRUBIN DIRECT: 0.7 MG/DL (ref 0–0.4)
BILIRUBIN, INDIRECT: 0.2 MG/DL (ref 0–0.6)
BODY FLUID CULTURE, STERILE: NORMAL
BUN BLDV-MCNC: 61 MG/DL (ref 8–23)
CALCIUM SERPL-MCNC: 7.8 MG/DL (ref 8.5–9.9)
CHLORIDE BLD-SCNC: 97 MEQ/L (ref 95–107)
CO2: 19 MEQ/L (ref 20–31)
CREAT SERPL-MCNC: 2.75 MG/DL (ref 0.5–0.9)
EOSINOPHILS ABSOLUTE: 0.1 K/UL (ref 0–0.7)
EOSINOPHILS RELATIVE PERCENT: 1 %
GFR AFRICAN AMERICAN: 20.4
GFR NON-AFRICAN AMERICAN: 16.9
GLUCOSE BLD-MCNC: 104 MG/DL (ref 60–115)
GLUCOSE BLD-MCNC: 106 MG/DL (ref 60–115)
GLUCOSE BLD-MCNC: 60 MG/DL (ref 70–99)
HCT VFR BLD CALC: 24.1 % (ref 37–47)
HEMOGLOBIN: 8 G/DL (ref 12–16)
HYPOCHROMIA: ABNORMAL
INR BLD: 1.2
LYMPHOCYTES ABSOLUTE: 0.2 K/UL (ref 1–4.8)
LYMPHOCYTES RELATIVE PERCENT: 2 %
MCH RBC QN AUTO: 22.9 PG (ref 27–31.3)
MCHC RBC AUTO-ENTMCNC: 33.1 % (ref 33–37)
MCV RBC AUTO: 69.2 FL (ref 82–100)
MICROCYTES: ABNORMAL
MONOCYTES ABSOLUTE: 0.5 K/UL (ref 0.2–0.8)
MONOCYTES RELATIVE PERCENT: 4.8 %
NEUTROPHILS ABSOLUTE: 9.7 K/UL (ref 1.4–6.5)
NEUTROPHILS RELATIVE PERCENT: 92 %
PDW BLD-RTO: 22.7 % (ref 11.5–14.5)
PERFORMED ON: NORMAL
PERFORMED ON: NORMAL
PLATELET # BLD: 199 K/UL (ref 130–400)
PLATELET SLIDE REVIEW: NORMAL
POIKILOCYTES: ABNORMAL
POLYCHROMASIA: ABNORMAL
POTASSIUM REFLEX MAGNESIUM: 4 MEQ/L (ref 3.4–4.9)
PROTHROMBIN TIME: 15.4 SEC (ref 12.3–14.9)
RBC # BLD: 3.48 M/UL (ref 4.2–5.4)
SODIUM BLD-SCNC: 131 MEQ/L (ref 135–144)
TARGET CELLS: ABNORMAL
TOTAL PROTEIN: 6.3 G/DL (ref 6.3–8)
WBC # BLD: 10.5 K/UL (ref 4.8–10.8)

## 2020-10-09 PROCEDURE — 6360000002 HC RX W HCPCS: Performed by: INTERNAL MEDICINE

## 2020-10-09 PROCEDURE — 80048 BASIC METABOLIC PNL TOTAL CA: CPT

## 2020-10-09 PROCEDURE — 2580000003 HC RX 258: Performed by: INTERNAL MEDICINE

## 2020-10-09 PROCEDURE — 85610 PROTHROMBIN TIME: CPT

## 2020-10-09 PROCEDURE — 97116 GAIT TRAINING THERAPY: CPT

## 2020-10-09 PROCEDURE — 6370000000 HC RX 637 (ALT 250 FOR IP): Performed by: INTERNAL MEDICINE

## 2020-10-09 PROCEDURE — 82140 ASSAY OF AMMONIA: CPT

## 2020-10-09 PROCEDURE — 80076 HEPATIC FUNCTION PANEL: CPT

## 2020-10-09 PROCEDURE — 85025 COMPLETE CBC W/AUTO DIFF WBC: CPT

## 2020-10-09 PROCEDURE — 36415 COLL VENOUS BLD VENIPUNCTURE: CPT

## 2020-10-09 RX ORDER — TORSEMIDE 20 MG/1
40 TABLET ORAL DAILY
Qty: 30 TABLET | Refills: 3 | Status: SHIPPED | OUTPATIENT
Start: 2020-10-10

## 2020-10-09 RX ORDER — INSULIN GLARGINE 100 [IU]/ML
20 INJECTION, SOLUTION SUBCUTANEOUS
Qty: 1 VIAL | Refills: 3 | Status: SHIPPED | OUTPATIENT
Start: 2020-10-09

## 2020-10-09 RX ORDER — LACTULOSE 10 G/15ML
20 SOLUTION ORAL 2 TIMES DAILY
Qty: 1 BOTTLE | Refills: 1 | Status: SHIPPED | OUTPATIENT
Start: 2020-10-09

## 2020-10-09 RX ORDER — CEFUROXIME AXETIL 250 MG/1
250 TABLET ORAL 2 TIMES DAILY
Qty: 14 TABLET | Refills: 0 | Status: SHIPPED | OUTPATIENT
Start: 2020-10-09 | End: 2020-10-16

## 2020-10-09 RX ORDER — HYDRALAZINE HYDROCHLORIDE 50 MG/1
50 TABLET, FILM COATED ORAL EVERY 8 HOURS SCHEDULED
Qty: 90 TABLET | Refills: 3 | Status: SHIPPED | OUTPATIENT
Start: 2020-10-09

## 2020-10-09 RX ORDER — ALBUTEROL SULFATE 90 UG/1
2 AEROSOL, METERED RESPIRATORY (INHALATION) EVERY 6 HOURS PRN
Qty: 1 INHALER | Refills: 3 | Status: SHIPPED | OUTPATIENT
Start: 2020-10-09

## 2020-10-09 RX ADMIN — CEFTRIAXONE SODIUM 1 G: 1 INJECTION, POWDER, FOR SOLUTION INTRAMUSCULAR; INTRAVENOUS at 02:45

## 2020-10-09 RX ADMIN — LACTULOSE 20 G: 20 SOLUTION ORAL at 08:44

## 2020-10-09 RX ADMIN — AMLODIPINE BESYLATE 10 MG: 10 TABLET ORAL at 08:44

## 2020-10-09 RX ADMIN — GUAIFENESIN 600 MG: 600 TABLET, EXTENDED RELEASE ORAL at 08:44

## 2020-10-09 RX ADMIN — HYDRALAZINE HYDROCHLORIDE 50 MG: 50 TABLET, FILM COATED ORAL at 05:50

## 2020-10-09 RX ADMIN — ENOXAPARIN SODIUM 30 MG: 30 INJECTION SUBCUTANEOUS at 08:44

## 2020-10-09 RX ADMIN — AZITHROMYCIN MONOHYDRATE 250 MG: 500 INJECTION, POWDER, LYOPHILIZED, FOR SOLUTION INTRAVENOUS at 03:19

## 2020-10-09 RX ADMIN — ASPIRIN 81 MG: 81 TABLET, CHEWABLE ORAL at 08:44

## 2020-10-09 RX ADMIN — TORSEMIDE 40 MG: 20 TABLET ORAL at 08:43

## 2020-10-09 ASSESSMENT — PAIN SCALES - GENERAL: PAINLEVEL_OUTOF10: 0

## 2020-10-09 NOTE — PLAN OF CARE
Problem: Skin Integrity:  Goal: Will show no infection signs and symptoms  Description: Will show no infection signs and symptoms  Outcome: Ongoing  Goal: Absence of new skin breakdown  Description: Absence of new skin breakdown  Outcome: Ongoing     Problem: Falls - Risk of:  Goal: Will remain free from falls  Description: Will remain free from falls  Outcome: Met This Shift  Goal: Absence of physical injury  Description: Absence of physical injury  Outcome: Met This Shift     Problem: Confusion - Acute:  Goal: Absence of continued neurological deterioration signs and symptoms  Description: Absence of continued neurological deterioration signs and symptoms  Outcome: Ongoing  Goal: Mental status will be restored to baseline  Description: Mental status will be restored to baseline  Outcome: Ongoing     Problem: Discharge Planning:  Goal: Ability to perform activities of daily living will improve  Description: Ability to perform activities of daily living will improve  Outcome: Ongoing  Goal: Participates in care planning  Description: Participates in care planning  Outcome: Ongoing  Goal: Discharged to appropriate level of care  Description: Discharged to appropriate level of care  Outcome: Ongoing     Problem: Injury - Risk of, Physical Injury:  Goal: Will remain free from falls  Description: Will remain free from falls  Outcome: Met This Shift  Goal: Absence of physical injury  Description: Absence of physical injury  Outcome: Met This Shift     Problem: Mood - Altered:  Goal: Mood stable  Description: Mood stable  Outcome: Ongoing  Goal: Absence of abusive behavior  Description: Absence of abusive behavior  Outcome: Ongoing  Goal: Verbalizations of feeling emotionally comfortable while being cared for will increase  Description: Verbalizations of feeling emotionally comfortable while being cared for will increase  Outcome: Ongoing     Problem: Psychomotor Activity - Altered:  Goal: Absence of psychomotor disturbance signs and symptoms  Description: Absence of psychomotor disturbance signs and symptoms  Outcome: Ongoing     Problem: Sensory Perception - Impaired:  Goal: Demonstrations of improved sensory functioning will increase  Description: Demonstrations of improved sensory functioning will increase  Outcome: Ongoing  Goal: Decrease in sensory misperception frequency  Description: Decrease in sensory misperception frequency  Outcome: Ongoing  Goal: Able to refrain from responding to false sensory perceptions  Description: Able to refrain from responding to false sensory perceptions  Outcome: Ongoing  Goal: Demonstrates accurate environmental perceptions  Description: Demonstrates accurate environmental perceptions  Outcome: Ongoing  Goal: Able to distinguish between reality-based and nonreality-based thinking  Description: Able to distinguish between reality-based and nonreality-based thinking  Outcome: Ongoing  Goal: Able to interrupt nonreality-based thinking  Description: Able to interrupt nonreality-based thinking  Outcome: Ongoing  Goal: Ability to maintain a stable neurologic state will improve  Description: Ability to maintain a stable neurologic state will improve  Outcome: Ongoing     Problem: Sleep Pattern Disturbance:  Goal: Appears well-rested  Description: Appears well-rested  Outcome: Ongoing     Problem: Mobility - Impaired:  Goal: Mobility will improve  Description: Mobility will improve  Outcome: Ongoing     Problem: Serum Glucose Level - Abnormal:  Goal: Ability to maintain appropriate glucose levels will improve  Description: Ability to maintain appropriate glucose levels will improve  Outcome: Ongoing

## 2020-10-09 NOTE — CONSULTS
[address]inna B[address]                      [address], [address] [facility]                                  CONSULTATION    PATIENT NAME: Flavio Wilks                    :        1945  MED REC NO:   [id number]                            ROOM:       H030  ACCOUNT NO:   [de-identified]                           ADMIT DATE: 10/04/2020  PROVIDER:     Yelena Medle MD    CONSULT DATE:  10/08/2020    INPATIENT CONSULTATION    PERSON REQUESTING CONSULT:  Siri Lugo MD    REASON FOR CONSULTATION:  Hydronephrosis. HISTORY OF PRESENT ILLNESS:  This is a 45-year-old female who has a  history of CKD since , history of diabetes, hypertension,  retinopathy, who was recently seen at [facility] DR JACEY HAWKINS and diagnosed with a  liver mass and found to have a cholangiocarcinoma with glandular  carcinoma of the liver. She was admitted through [facility] with confusion  and hyperglycemia. She is currently under oncology care deciding on  whether she is a surgical candidate versus chemotherapy candidate for  her new diagnosis. She is being seen by an oncologist at [address] [place] [id number] for further  evaluation here in the near future. She was found to have ascites and  had a peritoneal tap for this with concern that this could be a  malignant ascites. She also has cirrhosis. Urologic consultation was  requested because on admission on 10/04/2020, she had a CT scan that  suggested bilateral hydronephrosis and pelvocaliectasis but no obvious  stones. She has no prior urologic surgical history. She denies flank  pain. She denies hematuria. She denies dysuria. She has no UTIs. She  does reportedly have significant pelvic prolapse and uterine prolapse,  for which she wish to see a gynecologist through [address] [place] [id number] for as well. She  denies incontinence or other current urologic complaints. She has no  prior urologic surgical history reported.     PAST MEDICAL HISTORY:  Includes depression, hypertension, diabetes and the hydronephrosis,  although by my reading of the CT scan, it was difficult to determine  whether this was bilateral extrarenal pelvis or true hydronephrosis  given the amount of ascites present. There were no obvious obstructing  stones identified as well. The bladder was not overly distended. IMPRESSION:  A 68-year-old female with history of hypertension,  diabetes, recent diagnosis of cholangiocarcinoma and glandular carcinoma  of the liver with ascites who recently underwent paracentesis who has  chronic kidney disease since 2008 as well and finding a possible renal  pelvis dilation or hydronephrosis on recent CT scan. The patient is  asymptomatic with respect to this. She has no flank pain. She has no  evidence for infection. She has no incontinence. This could be related  to her history of pelvic prolapse or incomplete bladder emptying is  another potential cause. At this point what I would suggest her, I  would check a bladder scan residual.  Should the residual be elevated, I  place Hanks catheter. The residual was less than 200 mL. I would not  place catheter at this point. It is also not impossible that adenopathy  could be the cause of the hydronephrosis given her history of cancer or  the significant ascites could be attributing as well. I would not  recommend any urologic intervention at this time, also could place a Hanks  catheter if her kidney function deteriorates significantly. She  should continue with her management of her newly diagnosed  cholangiocarcinoma at [facility] We will continue to follow her  through her stay here. These recommendations were discussed with the  patient, her  and the nursing staff.         Iggy Emery MD    D: 10/08/2020 18:00:41       T: 10/08/2020 18:10:08     [email or web address]  Job#: [id number]     Doc#: [id number]    CC:  Génesis Huston MD

## 2020-10-09 NOTE — PROGRESS NOTES
Occupational Therapy  Facility/Department: [facility] [id number]  Interdisciplinary Communication Note      To the referring provider,     While we thank you for your referral, Marco Benitez was not seen for an evaluation as: The patient refused skilled OT intervention, denying a need. Spoke with pt and pt's family; pt. is d/c today per chart and pt. report. Pt. feels she has returned to her independence level from PTA. Pt. politely declines OT eval, feels she has no needs. Thank you,    Electronically signed by HUMZA Bowser/L on 10/9/20 at 3:12 PM EDT    The [facility] EMERGENCY [facility] AT [facility]. Department.

## 2020-10-09 NOTE — CARE COORDINATION
Spoke with Lexus [place] at [facility]. Verified all required information has been received and confirmed that patient would be discharged today. HOMERO Ruggiero aware. Pt and spouse aware.

## 2020-10-09 NOTE — FLOWSHEET NOTE
Shift assessment complete. Patient denies cp, sob, n/v. Patient A&Ox4,VSS. LS clear, even and unlabored. BS active in all 4 quadrants. Patient's abdomen is soft but distended. Patient denies any c/o pain at this time. Patient resting in bed. Bed alarm on. Call light within reach. Will continue to monitor.

## 2020-10-09 NOTE — PROGRESS NOTES
Physical Therapy Med Surg Daily Treatment Note  Facility/Department: [facility]  Room: [facility]/X082-20       NAME: Matilde Nobles  : 1945 (70 y.o.)  MRN: [id number]  CODE STATUS: Full Code    Date of Service: 10/9/2020    Patient Diagnosis(es): Weakness [R53.1]   Chief Complaint   Patient presents with    Hyperglycemia     in 400's at home was given 10 units before leaving now 0     Fall     Patient Active Problem List    Diagnosis Date Noted    Pneumonia of right lower lobe due to infectious organism     Hydronephrosis     Uncontrolled type 2 diabetes mellitus with hyperglycemia ([facility] [address]     Weakness 10/05/2020    Chronic kidney disease, stage III (moderate) 2019    Erythropoietin deficiency anemia 2019        Past Medical History:   Diagnosis Date    Depression     Hypertension      Past Surgical History:   Procedure Laterality Date    PARACENTESIS Left 10/06/2020    3330 cc by Dr. Tay Potter. Restrictions  Restrictions/Precautions: Fall Risk    SUBJECTIVE   General  Chart Reviewed: Yes  Family / Caregiver Present: Yes()  Subjective  Subjective: id like to get up    Pre-Session Pain Report     Pain Screening  Patient Currently in Pain: No  Pre Treatment Pain Screening  Pain at present: 0  Scale Used: Numeric Score  Intervention List: Patient able to continue with treatment    Post-Session Pain Report  Pain Assessment  Pain Assessment: 0-10  Pain Level: 0         OBJECTIVE             Transfers  Sit to Stand: Contact guard assistance;Minimal Assistance  Stand to sit: Contact guard assistance;Minimal Assistance  Bed to Chair: Contact guard assistance;Minimal assistance  Comment: assistance needed to guide hips to chair, increased lateral sway and some retro LOB, pt slightly unsteady.     Ambulation  Ambulation?: Yes  More Ambulation?: No  Ambulation 1  Surface: level tile  Device: Rolling Walker  Assistance: Minimal assistance;Contact guard assistance  Quality of Gait: improved management of WW, better BLE step length, no LOB, good turns, some delayed righting reactions, inconsistent darlyn. Distance: 61'  Comments: fatigued following, less impuslive than yesterday                                         Activity Tolerance  Activity Tolerance: Patient Tolerated treatment well;Patient limited by fatigue          ASSESSMENT     Pt demonstrated an improved ambulation tolerance this date characterized by her ability to tolerate increase in distance without excessive fatigue. Pt was less impulsive and demonstrated better dynamic balance. Discharge Recommendations:  Continue to assess pending progress, Patient would benefit from continued therapy after discharge    Goals  Short term goals  Short term goal 1: Pt to complete HEP with supervision  Long term goals  Long term goal 1: Pt to complete bed mobility with indep  Long term goal 2: Pt to complete transfers with supervision  Long term goal 3: Pt to ambulate with LRD 100ft and supervision    PLAN    Times per week: 3-6  Safety Devices  Type of devices: Call light within reach(pt was in bathroom and instructed to pull call light once finished.  also informed to have pt pull call light. NSG aware.)     [facility] (6 CLICK) BASIC MOBILITY  AM-PAC Inpatient Mobility Raw Score : 17     Therapy Time   Individual   Time In 1140   Time Out 1151   Minutes 11      gait:11       Aury Ruvalcaba PTA, 10/09/20 at 12:01 PM         Definitions for assistance levels  Independent = pt does not require any physical supervision or assistance from another person for activity completion. Device may be needed.   Stand by assistance = pt requires verbal cues or instructions from another person, close to but not touching, to perform the activity  Minimal assistance= pt performs 75% or more of the activity; assistance is required to complete the activity  Moderate assistance= pt performs 50% of the activity; assistance is required to complete the activity  Maximal assistance = pt performs 25% of the activity; assistance is required to complete the activity  Dependent = pt requires total physical assistance to accomplish the task

## 2020-10-09 NOTE — PROGRESS NOTES
1655 blood sugar checked with instructor. Results 46. Pt. Asymptomatic. Tracie RN notified. Per Tracie RN pt to get 15 g of oral glucose gel as ordered. [place] at bedside. Glucose oral gel 15g administered per Kathy Billy - instructor. 1720 Blood sugar rechecked and now 57. Pt asymptomatic. Patient's dinner at bedside and now eating. Tracie RN notified. Tracie RN to monitor and reassess the patient.     [address] Student Nurse [employer]

## 2020-10-09 NOTE — CARE COORDINATION
Phoned [facility] to verify patient status of acceptance. They verified that information has to be faxed and they will call us back to let us know of acceptance or not. Messaged Dr Anum Rose for [facility] orders. 12:24-Faxed all required info to [facility] at [phone].  Awaiting approval.

## 2020-10-09 NOTE — DISCHARGE INSTR - COC
Continuity of Care Form    Patient Name: Susie Pagan   :  1945  MRN:  [id number]    Admit date:  10/4/2020  Discharge date:  10/9/2020    Code Status Order: Full Code   Advance Directives:   [facility] Documentation     Date/Time Healthcare Directive Type of Healthcare Directive Copy in [facility] [address] Agent's Name Healthcare Agent's Phone Number    10/05/20 9319  No, patient does not have an advance directive for healthcare treatment -- -- -- -- --          Admitting Physician:  Andrei Casarez DO  PCP: Demetri Lieberman MD    Discharging Nurse: Annabelle Fontenot RN  [address] Unit/Room#: G033/L299-97  Discharging Unit Phone Number: [phone]    Emergency Contact:   Extended Emergency Contact Information  Primary Emergency Contact: brianna townsend  Home Phone: [phone]  Mobile Phone: [phone]  Relation: Spouse  Secondary Emergency Contact: shey clark  Home Phone: [phone]  Relation: Child    Past Surgical History:  Past Surgical History:   Procedure Laterality Date    PARACENTESIS Left 10/06/2020    3330 cc by Dr. Belen Mckay. Immunization History: There is no immunization history on file for this patient.     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 [address] E11.65    Pneumonia of right lower lobe due to infectious organism J18.9    Hydronephrosis N13.30       Isolation/Infection:   Isolation          No Isolation        Patient Infection Status     Infection Onset Added Last Indicated Last Indicated By Review Planned Expiration Resolved Resolved By    None active    Resolved    COVID-19 Rule Out 10/05/20 10/05/20 10/06/20 COVID-19 (Ordered)   10/06/20 Rule-Out Test Resulted    COVID-19 Rule Out 10/05/20 10/05/20 10/05/20 COVID-19 (Ordered)   10/05/20 Rule-Out Test Resulted          Nurse Assessment:  Last Vital Signs: /61   Pulse 115   Temp 98.2 °F (36.8 °C) (Oral)   Resp 18   Ht 5' 3\" (1.6 m)   Wt 143 lb (64.9 kg)   SpO2 99%   BMI 25.33 kg/m²     Last documented pain score (0-10 scale): Pain Level: 0  Last Weight:   Wt Readings from Last 1 Encounters:   10/04/20 143 lb (64.9 kg)     Mental Status:  oriented and alert    IV Access:  - None    Nursing Mobility/ADLs:  Walking   Assisted  Transfer  Independent  Bathing  Assisted  Dressing  Independent  [address]  Independent  Med Delivery   whole    Wound Care Documentation and Therapy:  Wound 10/07/20 Abdomen Left; Lower;Quadrant Paracentesis site (Active)   Dressing Status Clean;Dry; Intact 10/09/20 0903   Dressing/Treatment Other (comment) 10/09/20 0903   Drainage Amount None 10/09/20 0903   Odor None 10/09/20 0903   Number of days: 2        Elimination:  Continence:   · Bowel: Yes  · Bladder: Hanks  Urinary Catheter: Insertion Date: 10/8/2020   Colostomy/Ileostomy/Ileal Conduit: No       Date of Last BM: 10/9/2020    Intake/Output Summary (Last 24 hours) at 10/9/2020 1147  Last data filed at 10/9/2020 0646  Gross per 24 hour   Intake --   Output 2100 ml   Net -2100 ml     I/O last 3 completed shifts:  In: -   Out: 2100 [Urine:2100]    Safety Concerns: At Risk for Falls    Impairments/Disabilities:      None    Nutrition Therapy:  Current Nutrition Therapy:   - Oral Diet:  Carb Control 3 carbs/meal (1500kcals/day) and no added salt    Routes of Feeding: Oral  Liquids: Thin Liquids  Daily Fluid Restriction: no  Last Modified Barium Swallow with Video (Video Swallowing Test): not done    Treatments at the Time of Hospital Discharge:   Respiratory Treatments: combivent inhaler  Oxygen Therapy:  is not on home oxygen therapy.   Ventilator:    - No ventilator support    Rehab Therapies: Physical Therapy  Weight Bearing Status/Restrictions: No weight bearing restirctions  Other Medical Equipment (for information only, NOT a DME order):  walker  Other Treatments: none    Patient's personal belongings (please select all that are sent with patient):  {[facility] DME Belongings:573586554}    RN SIGNATURE:  Electronically signed by Arianne Collins RN on 10/9/20 at 11:51 AM EDT    CASE MANAGEMENT/SOCIAL WORK SECTION    Inpatient Status Date: ***    Readmission Risk Assessment Score:  Readmission Risk              Risk of Unplanned Readmission:        17           Discharging to Facility/ Agency   · Name: [facility]  · Address:  · Phone:[phone]  · Fax:    Dialysis Facility (if applicable)   · Name:  · Address:  · Dialysis Schedule:  · Phone:  · Fax:    / signature: Electronically signed by Malik Villeda RN on 10/9/20 at 1:47 PM EDT    PHYSICIAN SECTION    Prognosis: {Prognosis:0279562497}    Condition at Discharge: [address] [place] [address] Patient Condition:[id number]}    Rehab Potential (if transferring to Rehab): {Prognosis:2850249566}    Recommended Labs or Other Treatments After Discharge: ***    Physician Certification: I certify the above information and transfer of Liza Hinton  is necessary for the continuing treatment of the diagnosis listed and that she requires {Admit to Appropriate Level of Care:18348} for {GREATER/LESS:995995976} 30 days.      Update Admission H&P: {CHP DME Changes in NBSQT:881196682}    PHYSICIAN SIGNATURE:  {Esignature:124463196}

## 2020-10-09 NOTE — PROGRESS NOTES
Nephrology Progress Note    Assessment/  71 yo lady with ckd stage 3-4 due to DM, HTN with newly diagnosed cholangio / liver cancer. Has ascites and fluid overload. S/p paracentesis w/ 2300 ml on 10/05. Sig swelling now on torsemide. Anemia. bp is ok. Renal function worsening likely due to catheter being removed, has b/l hydronephrosis.  Has hypoalbuminemia     Plan/  - continue torsemide 40 mg QD  - ok to go w/ catheter, recommend urology f/u in 1 week   - deferring anemia management to oncology   - will have pt see Dr. Mike Montiel in 1-2 weeks   - ok for discharge from renal standpoint       Patient Active Problem List:     Erythropoietin deficiency anemia     Chronic kidney disease, stage III (moderate)     Weakness     Uncontrolled type 2 diabetes mellitus with hyperglycemia ([facility] [address]      Subjective:  Admit Date: 10/4/2020    Interval History: renal function stable today since palm placement, seen by urology, plan for discharge today     Medications:  Scheduled Meds:   sodium chloride  20 mL Intravenous Once    insulin glargine  20 Units Subcutaneous Daily before lunch    insulin lispro  0-12 Units Subcutaneous TID WC    insulin lispro  0-6 Units Subcutaneous Nightly    torsemide  40 mg Oral Daily    insulin lispro  12 Units Subcutaneous TID AC    enoxaparin  30 mg Subcutaneous Daily    guaiFENesin  600 mg Oral BID    cefTRIAXone (ROCEPHIN) IV  1 g Intravenous Q24H    azithromycin  250 mg Intravenous Q24H    amLODIPine  10 mg Oral Daily    aspirin  81 mg Oral Daily    hydrALAZINE  50 mg Oral 3 times per day    lactulose  20 g Oral BID     Continuous Infusions:   dextrose         CBC:   Recent Labs     10/08/20  0503 10/09/20  0535   WBC 12.1* 10.5   HGB 6.9* 8.0*    199     CMP:    Recent Labs     10/08/20  0503 10/08/20  1224 10/09/20  0535   * 126* 131*   K 4.2 4.8 4.0   CL 97 95 97   CO2 18* 16* 19*   BUN 61* 60* 61*   CREATININE 2.83* 2.90* 2.75*   GLUCOSE 178* 125* 60*   CALCIUM

## 2020-10-09 NOTE — DISCHARGE SUMMARY
Discharge Summary    Date: 10/9/2020  Patient Name: Freda Villasenor YOB: 1945 Age: 70 y.o. Admit Date: 10/4/2020  Discharge Date: 10/9/2020  Discharge Condition: [address]    Admission Diagnosis  Weakness (R53.1)     Discharge Diagnosis  Active Problems: Weakness Uncontrolled type 2 diabetes mellitus with hyperglycemia (HCC) Pneumonia of right lower lobe due to infectious organism HydronephrosisResolved Problems: * No resolved hospital problems. [facility] Stay  Narrative of Hospital Course:  Was admitted for fluid overload and acute kidney injury and hyperglycemia. Patient had abdominal tap more than 3 L was removed. Patient was started on IV antibiotics for elevated procalcitonin and pneumonia. Patient received 1 unit of PRBC for anemia. Patient started on torsemide for fluid overload. Patient received Hanks because of the hydronephrosis and worsening renal function, was evaluated by urology cleared for discharge. Patient is to follow urology as outpatient for voiding trial.  Patient has appointment with OB/GYN for possible prolapse. Patient is medically stable for discharge. Patient overall prognosis is poor because of the cholangiocarcinoma with mets. Recommend palliative care as outpatient. Consultants:  IP CONSULT TO GIIP CONSULT TO ENDOCRINOLOGYIP CONSULT TO ONCOLOGYIP CONSULT TO INFECTIOUS DISEASESIP CONSULT TO PALLIATIVE CAREIP CONSULT TO INTERVENTIONAL RADIOLOGYIP CONSULT TO ENDOCRINOLOGYIP CONSULT TO NEPHROLOGYIP CONSULT TO PALLIATIVE CAREIP CONSULT TO UROLOGYIP CONSULT TO DIETITIAN    Surgeries/procedures Performed:       Treatments:    Insulin and Antibiotics        Discharge Plan/Disposition:  Home    Hospital/Incidental Findings Requiring Follow Up:    Patient Instructions:    Diet: Diabetic Diet    Activity:Activity as Tolerated  For number of days (if applicable): Other Instructions:    Provider Follow-Up:   No follow-ups on file.      Significant Diagnostic Studies:    Recent Labs:  Admission on 10/04/2020No results displayed because visit has over 200 results. ------------    Radiology last 7 days:  Ct Abdomen Pelvis Wo Contrast Additional Contrast? NoneResult Date: 10/5/2020Multiple ill-defined and rounded low attenuation foci throughout the right and left hepatic lobes. Malignancy diagnosis of exclusion. Cirrhosis. Anasarca, with large volume ascites. Moderate right and mild to moderate left hydronephrosis with bilateral renal vascular calcification, and possible superimposed bilateral renal calculi measuring up to 2 mm. No etiology of obstruction identified on this noncontrast enhanced study. All CT scans at this facility use dose modulation, iterative reconstruction, and/or weight based dosing when appropriate to reduce radiation dose to as low as reasonably achievable. Ct Head Wo ContrastResult Date: 10/5/2020NO ACUTE INTRACRANIAL PATHOLOGY. Xr Chest PortableResult Date: 10/5/2020BILATERAL LOWER LUNG ATELECTASIS/PNEUMONIA VERSUS EDEMA. Us Dup Abd Pel Retro Scrot CompleteResult Date: 10/5/2020CHRONIC PORTAL VEIN THROMBOSIS. LIVER MASSES SUSPECTED CONSISTENT WITH MULTICENTRIC HEPATOCELLULAR CARCINOMA VERSUS METASTATIC DISEASE. CONFIRM WITH FURTHER CROSS-SECTIONAL IMAGING AS DISCUSSED IN THE REPORT. Us Guided ParacentesisResult Date: 10/8/45009. Status post technically successful ultrasound-guided paracentesis. Ganga Parisi is a Female of 70 years age, referred for Ultrasound Guided Paracentesis. PROCEDURE: Survey of the abdomen showed large amount of ascites fluid. After obtaining informed consent, the patient was positioned supine on the sonography table. Using ultrasound, the skin over the left hemiabdomen was locally anesthetized with 1% lidocaine. Following that, a Yueh needle was advanced into the fluid pocket using ultrasound visualization. 2330cc, of slightly turbid pink fluid were aspirated and sent for cytology, and pathology.   The injection vialComments:Reason for Stopping:glimepiride (AMARYL) 2 MG tabletComments:Reason for Stopping:    Time Spent on Discharge:E] minutes were spent in patient examination, evaluation, counseling as well as medication reconciliation, prescriptions for required medications, discharge plan, and follow up.     Electronically signed by Melissa Rivera MD on 10/9/20 at 11:27 AM EDT

## 2020-10-10 LAB
BLOOD CULTURE, ROUTINE: NORMAL
CULTURE, BLOOD 2: NORMAL

## 2020-10-12 ENCOUNTER — HOSPITAL ENCOUNTER (INPATIENT)
Age: 75
LOS: 1 days | Discharge: ANOTHER HOSPITAL | DRG: 432 | End: 2020-10-14
Attending: EMERGENCY MEDICINE | Admitting: INTERNAL MEDICINE
Payer: MEDICARE

## 2020-10-12 ENCOUNTER — TELEPHONE (OUTPATIENT)
Dept: OTHER | Facility: CLINIC | Age: 75
End: 2020-10-12

## 2020-10-12 ENCOUNTER — HOSPITAL ENCOUNTER (EMERGENCY)
Age: 75
Discharge: HOME | DRG: 432 | End: 2020-10-12
Attending: EMERGENCY MEDICINE
Payer: MEDICARE

## 2020-10-12 ENCOUNTER — HOSPITAL ENCOUNTER (OUTPATIENT)
Dept: INFUSION THERAPY | Age: 75
Setting detail: INFUSION SERIES
Discharge: HOME | DRG: 432 | End: 2020-10-12
Payer: MEDICARE

## 2020-10-12 VITALS
WEIGHT: 128 LBS | HEIGHT: 63 IN | SYSTOLIC BLOOD PRESSURE: 146 MMHG | DIASTOLIC BLOOD PRESSURE: 76 MMHG | BODY MASS INDEX: 22.68 KG/M2 | TEMPERATURE: 97.1 F | HEART RATE: 89 BPM | RESPIRATION RATE: 19 BRPM | OXYGEN SATURATION: 99 %

## 2020-10-12 VITALS
RESPIRATION RATE: 18 BRPM | HEART RATE: 89 BPM | DIASTOLIC BLOOD PRESSURE: 66 MMHG | SYSTOLIC BLOOD PRESSURE: 147 MMHG | TEMPERATURE: 98.6 F

## 2020-10-12 DIAGNOSIS — D64.9 ANEMIA, UNSPECIFIED TYPE: ICD-10-CM

## 2020-10-12 DIAGNOSIS — D63.1 ERYTHROPOIETIN DEFICIENCY ANEMIA: ICD-10-CM

## 2020-10-12 DIAGNOSIS — N18.30 STAGE 3 CHRONIC KIDNEY DISEASE, UNSPECIFIED WHETHER STAGE 3A OR 3B CKD (HCC): Primary | ICD-10-CM

## 2020-10-12 DIAGNOSIS — T83.9XXA PROBLEM WITH FOLEY CATHETER, INITIAL ENCOUNTER (HCC): Primary | ICD-10-CM

## 2020-10-12 DIAGNOSIS — N81.4 UTERINE PROLAPSE: ICD-10-CM

## 2020-10-12 DIAGNOSIS — K92.0 HEMATEMESIS, PRESENCE OF NAUSEA NOT SPECIFIED: Primary | ICD-10-CM

## 2020-10-12 PROCEDURE — 6360000002 HC RX W HCPCS: Performed by: INTERNAL MEDICINE

## 2020-10-12 PROCEDURE — 99284 EMERGENCY DEPT VISIT MOD MDM: CPT

## 2020-10-12 PROCEDURE — 6370000000 HC RX 637 (ALT 250 FOR IP): Performed by: EMERGENCY MEDICINE

## 2020-10-12 PROCEDURE — 96372 THER/PROPH/DIAG INJ SC/IM: CPT

## 2020-10-12 PROCEDURE — 96374 THER/PROPH/DIAG INJ IV PUSH: CPT

## 2020-10-12 PROCEDURE — 99283 EMERGENCY DEPT VISIT LOW MDM: CPT

## 2020-10-12 RX ORDER — LIDOCAINE HYDROCHLORIDE 20 MG/ML
JELLY TOPICAL ONCE
Status: COMPLETED | OUTPATIENT
Start: 2020-10-12 | End: 2020-10-12

## 2020-10-12 RX ADMIN — EPOETIN ALFA-EPBX 10000 UNITS: 10000 INJECTION, SOLUTION INTRAVENOUS; SUBCUTANEOUS at 14:44

## 2020-10-12 RX ADMIN — LIDOCAINE HYDROCHLORIDE: 20 JELLY TOPICAL at 16:33

## 2020-10-12 ASSESSMENT — ENCOUNTER SYMPTOMS
SORE THROAT: 0
VOMITING: 0
SHORTNESS OF BREATH: 0
ABDOMINAL PAIN: 0
NAUSEA: 0
CHEST TIGHTNESS: 0
EYE PAIN: 0

## 2020-10-12 ASSESSMENT — PAIN SCALES - GENERAL: PAINLEVEL_OUTOF10: 10

## 2020-10-12 ASSESSMENT — PAIN DESCRIPTION - LOCATION: LOCATION: GROIN

## 2020-10-12 NOTE — FLOWSHEET NOTE
Patient to the floor via wheelchair for her injection. Vital signs taken. Complains of discomfort at the palm catheter site. Encouraged to call Home health care to have it evaluated. Call light within reach. No distress is noted.

## 2020-10-12 NOTE — TELEPHONE ENCOUNTER
Writer contacted Dr. Nara Gomez,  to inform of 30 day readmission risk. No decision noted in patient's chart for readmission at this time.

## 2020-10-12 NOTE — PROGRESS NOTES
Physical Therapy  Facility/Department: [facility] MED SURG T724/K070-86  Physical Therapy Discharge      NAME: Marco Benitez    : 1945 (70 y.o.)  MRN: [id number]    Account: [de-identified]  Gender: female      Patient has been discharged from acute care hospital. DC patient from current PT program.      Electronically signed by Pearl Aranda PT on 10/12/20 at 4:22 PM EDT

## 2020-10-12 NOTE — ED TRIAGE NOTES
Pt to ER with c/o catheter pain, pt states pain is 10/10, pt has had catheter for about a week, pt a&ox4, resp even and unlabored

## 2020-10-12 NOTE — FLOWSHEET NOTE
Injection given in the left arm. Tolerated well. Refused observation. Left the unit via wheelchair. All equipment used in the care for this patient has been cleaned.

## 2020-10-13 ENCOUNTER — ANESTHESIA (OUTPATIENT)
Dept: ENDOSCOPY | Age: 75
DRG: 432 | End: 2020-10-13
Payer: MEDICARE

## 2020-10-13 ENCOUNTER — ANESTHESIA EVENT (OUTPATIENT)
Dept: ENDOSCOPY | Age: 75
DRG: 432 | End: 2020-10-13
Payer: MEDICARE

## 2020-10-13 ENCOUNTER — APPOINTMENT (OUTPATIENT)
Dept: GENERAL RADIOLOGY | Age: 75
DRG: 432 | End: 2020-10-13
Payer: MEDICARE

## 2020-10-13 ENCOUNTER — ANCILLARY PROCEDURE (OUTPATIENT)
Dept: ENDOSCOPY | Age: 75
DRG: 432 | End: 2020-10-13
Payer: MEDICARE

## 2020-10-13 VITALS
RESPIRATION RATE: 13 BRPM | DIASTOLIC BLOOD PRESSURE: 53 MMHG | OXYGEN SATURATION: 100 % | SYSTOLIC BLOOD PRESSURE: 114 MMHG

## 2020-10-13 PROBLEM — K92.2 UPPER GI BLEED: Status: ACTIVE | Noted: 2020-10-13

## 2020-10-13 PROBLEM — R18.8 ASCITES: Status: ACTIVE | Noted: 2020-10-13

## 2020-10-13 PROBLEM — I10 HTN (HYPERTENSION): Status: ACTIVE | Noted: 2020-10-13

## 2020-10-13 LAB
ABO/RH: NORMAL
ALBUMIN SERPL-MCNC: 1.4 G/DL (ref 3.5–4.6)
ALP BLD-CCNC: 324 U/L (ref 40–130)
ALT SERPL-CCNC: 30 U/L (ref 0–33)
ANION GAP SERPL CALCULATED.3IONS-SCNC: 10 MEQ/L (ref 9–15)
ANISOCYTOSIS: ABNORMAL
ANTIBODY SCREEN: NORMAL
APTT: 33.6 SEC (ref 24.4–36.8)
AST SERPL-CCNC: 63 U/L (ref 0–35)
BASE EXCESS ARTERIAL: 0 (ref -3–3)
BASOPHILS ABSOLUTE: 0.1 K/UL (ref 0–0.2)
BASOPHILS RELATIVE PERCENT: 1 %
BILIRUB SERPL-MCNC: 0.6 MG/DL (ref 0.2–0.7)
BLOOD BANK DISPENSE STATUS: NORMAL
BLOOD BANK DISPENSE STATUS: NORMAL
BLOOD BANK PRODUCT CODE: NORMAL
BLOOD BANK PRODUCT CODE: NORMAL
BPU ID: NORMAL
BPU ID: NORMAL
BUN BLDV-MCNC: 78 MG/DL (ref 8–23)
CALCIUM IONIZED: 1.02 MMOL/L (ref 1.12–1.32)
CALCIUM SERPL-MCNC: 6.9 MG/DL (ref 8.5–9.9)
CHLORIDE BLD-SCNC: 94 MEQ/L (ref 95–107)
CHP ED QC CHECK: YES
CO2: 25 MEQ/L (ref 20–31)
CREAT SERPL-MCNC: 2.26 MG/DL (ref 0.5–0.9)
DESCRIPTION BLOOD BANK: NORMAL
DESCRIPTION BLOOD BANK: NORMAL
EOSINOPHILS ABSOLUTE: 0.1 K/UL (ref 0–0.7)
EOSINOPHILS RELATIVE PERCENT: 0.7 %
GFR AFRICAN AMERICAN: 21
GFR AFRICAN AMERICAN: 25.6
GFR NON-AFRICAN AMERICAN: 17
GFR NON-AFRICAN AMERICAN: 21.2
GLOBULIN: 3.3 G/DL (ref 2.3–3.5)
GLUCOSE BLD-MCNC: 110 MG/DL
GLUCOSE BLD-MCNC: 110 MG/DL (ref 60–115)
GLUCOSE BLD-MCNC: 123 MG/DL (ref 70–99)
GLUCOSE BLD-MCNC: 161 MG/DL (ref 60–115)
GLUCOSE BLD-MCNC: 176 MG/DL (ref 60–115)
GLUCOSE BLD-MCNC: 178 MG/DL (ref 60–115)
GLUCOSE BLD-MCNC: 205 MG/DL (ref 60–115)
HBA1C MFR BLD: 7.8 % (ref 4.8–5.9)
HCO3 ARTERIAL: 23.6 MMOL/L (ref 21–29)
HCT VFR BLD CALC: 18 % (ref 37–47)
HCT VFR BLD CALC: 19.7 % (ref 37–47)
HCT VFR BLD CALC: 20.9 % (ref 37–47)
HCT VFR BLD CALC: 21.4 % (ref 37–47)
HEMOGLOBIN: 5.9 G/DL (ref 12–16)
HEMOGLOBIN: 6.4 G/DL (ref 12–16)
HEMOGLOBIN: 6.9 G/DL (ref 12–16)
HEMOGLOBIN: 7 G/DL (ref 12–16)
HEMOGLOBIN: 7.3 GM/DL (ref 12–16)
HYPOCHROMIA: ABNORMAL
INR BLD: 1.3
LACTATE: 0.6 MMOL/L (ref 0.4–2)
LYMPHOCYTES ABSOLUTE: 0.9 K/UL (ref 1–4.8)
LYMPHOCYTES RELATIVE PERCENT: 7.3 %
MCH RBC QN AUTO: 22.6 PG (ref 27–31.3)
MCHC RBC AUTO-ENTMCNC: 32.8 % (ref 33–37)
MCV RBC AUTO: 68.9 FL (ref 82–100)
MICROCYTES: ABNORMAL
MONOCYTES ABSOLUTE: 1.2 K/UL (ref 0.2–0.8)
MONOCYTES RELATIVE PERCENT: 9.8 %
NEUTROPHILS ABSOLUTE: 9.5 K/UL (ref 1.4–6.5)
NEUTROPHILS RELATIVE PERCENT: 81.2 %
O2 SAT, ARTERIAL: 100 % (ref 93–100)
PCO2 ARTERIAL: 34 MM HG (ref 35–45)
PDW BLD-RTO: 22.3 % (ref 11.5–14.5)
PERFORMED ON: ABNORMAL
PERFORMED ON: NORMAL
PH ARTERIAL: 7.46 (ref 7.35–7.45)
PLATELET # BLD: 241 K/UL (ref 130–400)
PLATELET SLIDE REVIEW: NORMAL
PO2 ARTERIAL: 182 MM HG (ref 75–108)
POC CHLORIDE: 98 MEQ/L (ref 99–110)
POC CREATININE: 2.7 MG/DL (ref 0.6–1.2)
POC HEMATOCRIT: 22 % (ref 36–48)
POC POTASSIUM: 5.1 MEQ/L (ref 3.5–5.1)
POC SAMPLE TYPE: ABNORMAL
POC SODIUM: 128 MEQ/L (ref 136–145)
POIKILOCYTES: ABNORMAL
POTASSIUM SERPL-SCNC: 4.5 MEQ/L (ref 3.4–4.9)
PROTHROMBIN TIME: 16.2 SEC (ref 12.3–14.9)
RBC # BLD: 2.61 M/UL (ref 4.2–5.4)
SLIDE REVIEW: ABNORMAL
SODIUM BLD-SCNC: 129 MEQ/L (ref 135–144)
TARGET CELLS: ABNORMAL
TCO2 ARTERIAL: 25 (ref 22–29)
TOTAL PROTEIN: 4.7 G/DL (ref 6.3–8)
TOXIC GRANULATION: ABNORMAL
VACUOLATED NEUTROPHILS: PRESENT
WBC # BLD: 11.8 K/UL (ref 4.8–10.8)

## 2020-10-13 PROCEDURE — 6360000002 HC RX W HCPCS

## 2020-10-13 PROCEDURE — 82948 REAGENT STRIP/BLOOD GLUCOSE: CPT

## 2020-10-13 PROCEDURE — 85610 PROTHROMBIN TIME: CPT

## 2020-10-13 PROCEDURE — 36415 COLL VENOUS BLD VENIPUNCTURE: CPT

## 2020-10-13 PROCEDURE — 6370000000 HC RX 637 (ALT 250 FOR IP): Performed by: NURSE PRACTITIONER

## 2020-10-13 PROCEDURE — 86901 BLOOD TYPING SEROLOGIC RH(D): CPT

## 2020-10-13 PROCEDURE — 3700000000 HC ANESTHESIA ATTENDED CARE: Performed by: INTERNAL MEDICINE

## 2020-10-13 PROCEDURE — 83036 HEMOGLOBIN GLYCOSYLATED A1C: CPT

## 2020-10-13 PROCEDURE — 2580000003 HC RX 258: Performed by: INTERNAL MEDICINE

## 2020-10-13 PROCEDURE — 2000000000 HC ICU R&B

## 2020-10-13 PROCEDURE — 85014 HEMATOCRIT: CPT

## 2020-10-13 PROCEDURE — 06L38CZ OCCLUSION OF ESOPHAGEAL VEIN WITH EXTRALUMINAL DEVICE, VIA NATURAL OR ARTIFICIAL OPENING ENDOSCOPIC: ICD-10-PCS | Performed by: INTERNAL MEDICINE

## 2020-10-13 PROCEDURE — 2580000003 HC RX 258: Performed by: NURSE ANESTHETIST, CERTIFIED REGISTERED

## 2020-10-13 PROCEDURE — 6360000002 HC RX W HCPCS: Performed by: NURSE PRACTITIONER

## 2020-10-13 PROCEDURE — 6360000002 HC RX W HCPCS: Performed by: INTERNAL MEDICINE

## 2020-10-13 PROCEDURE — 6370000000 HC RX 637 (ALT 250 FOR IP): Performed by: INTERNAL MEDICINE

## 2020-10-13 PROCEDURE — 36600 WITHDRAWAL OF ARTERIAL BLOOD: CPT

## 2020-10-13 PROCEDURE — 2580000003 HC RX 258: Performed by: EMERGENCY MEDICINE

## 2020-10-13 PROCEDURE — 99223 1ST HOSP IP/OBS HIGH 75: CPT | Performed by: INTERNAL MEDICINE

## 2020-10-13 PROCEDURE — 3700000001 HC ADD 15 MINUTES (ANESTHESIA): Performed by: INTERNAL MEDICINE

## 2020-10-13 PROCEDURE — C9113 INJ PANTOPRAZOLE SODIUM, VIA: HCPCS | Performed by: NURSE PRACTITIONER

## 2020-10-13 PROCEDURE — P9047 ALBUMIN (HUMAN), 25%, 50ML: HCPCS | Performed by: INTERNAL MEDICINE

## 2020-10-13 PROCEDURE — 96374 THER/PROPH/DIAG INJ IV PUSH: CPT

## 2020-10-13 PROCEDURE — 86850 RBC ANTIBODY SCREEN: CPT

## 2020-10-13 PROCEDURE — 86900 BLOOD TYPING SEROLOGIC ABO: CPT

## 2020-10-13 PROCEDURE — 31500 INSERT EMERGENCY AIRWAY: CPT

## 2020-10-13 PROCEDURE — 2580000003 HC RX 258: Performed by: NURSE PRACTITIONER

## 2020-10-13 PROCEDURE — 2709999900 HC NON-CHARGEABLE SUPPLY: Performed by: INTERNAL MEDICINE

## 2020-10-13 PROCEDURE — 2500000003 HC RX 250 WO HCPCS: Performed by: NURSE ANESTHETIST, CERTIFIED REGISTERED

## 2020-10-13 PROCEDURE — 94002 VENT MGMT INPAT INIT DAY: CPT

## 2020-10-13 PROCEDURE — 3609017100 HC EGD: Performed by: INTERNAL MEDICINE

## 2020-10-13 PROCEDURE — 340B HC RX 637 (ALT 250 FOR IP): Performed by: INTERNAL MEDICINE

## 2020-10-13 PROCEDURE — 84132 ASSAY OF SERUM POTASSIUM: CPT

## 2020-10-13 PROCEDURE — 36430 TRANSFUSION BLD/BLD COMPNT: CPT

## 2020-10-13 PROCEDURE — 82435 ASSAY OF BLOOD CHLORIDE: CPT

## 2020-10-13 PROCEDURE — 82330 ASSAY OF CALCIUM: CPT

## 2020-10-13 PROCEDURE — P9016 RBC LEUKOCYTES REDUCED: HCPCS

## 2020-10-13 PROCEDURE — 43244 EGD VARICES LIGATION: CPT | Performed by: INTERNAL MEDICINE

## 2020-10-13 PROCEDURE — 86923 COMPATIBILITY TEST ELECTRIC: CPT

## 2020-10-13 PROCEDURE — 2720000010 HC SURG SUPPLY STERILE: Performed by: INTERNAL MEDICINE

## 2020-10-13 PROCEDURE — 82803 BLOOD GASES ANY COMBINATION: CPT

## 2020-10-13 PROCEDURE — 82565 ASSAY OF CREATININE: CPT

## 2020-10-13 PROCEDURE — 6360000002 HC RX W HCPCS: Performed by: NURSE ANESTHETIST, CERTIFIED REGISTERED

## 2020-10-13 PROCEDURE — 83605 ASSAY OF LACTIC ACID: CPT

## 2020-10-13 PROCEDURE — 2500000003 HC RX 250 WO HCPCS: Performed by: INTERNAL MEDICINE

## 2020-10-13 PROCEDURE — 85730 THROMBOPLASTIN TIME PARTIAL: CPT

## 2020-10-13 PROCEDURE — 6360000002 HC RX W HCPCS: Performed by: EMERGENCY MEDICINE

## 2020-10-13 PROCEDURE — 84295 ASSAY OF SERUM SODIUM: CPT

## 2020-10-13 PROCEDURE — 5A1935Z RESPIRATORY VENTILATION, LESS THAN 24 CONSECUTIVE HOURS: ICD-10-PCS | Performed by: INTERNAL MEDICINE

## 2020-10-13 PROCEDURE — 99222 1ST HOSP IP/OBS MODERATE 55: CPT | Performed by: INTERNAL MEDICINE

## 2020-10-13 PROCEDURE — 71045 X-RAY EXAM CHEST 1 VIEW: CPT

## 2020-10-13 PROCEDURE — 85018 HEMOGLOBIN: CPT

## 2020-10-13 PROCEDURE — 85025 COMPLETE CBC W/AUTO DIFF WBC: CPT

## 2020-10-13 PROCEDURE — 80053 COMPREHEN METABOLIC PANEL: CPT

## 2020-10-13 RX ORDER — SODIUM CHLORIDE 9 MG/ML
INJECTION, SOLUTION INTRAVENOUS CONTINUOUS PRN
Status: DISCONTINUED | OUTPATIENT
Start: 2020-10-13 | End: 2020-10-13 | Stop reason: SDUPTHER

## 2020-10-13 RX ORDER — NICOTINE POLACRILEX 4 MG
15 LOZENGE BUCCAL PRN
Status: DISCONTINUED | OUTPATIENT
Start: 2020-10-13 | End: 2020-10-14 | Stop reason: HOSPADM

## 2020-10-13 RX ORDER — ACETAMINOPHEN 650 MG/1
650 SUPPOSITORY RECTAL EVERY 6 HOURS PRN
Status: DISCONTINUED | OUTPATIENT
Start: 2020-10-13 | End: 2020-10-14 | Stop reason: HOSPADM

## 2020-10-13 RX ORDER — SODIUM CHLORIDE 9 MG/ML
INJECTION, SOLUTION INTRAVENOUS
Status: COMPLETED
Start: 2020-10-13 | End: 2020-10-13

## 2020-10-13 RX ORDER — 0.9 % SODIUM CHLORIDE 0.9 %
20 INTRAVENOUS SOLUTION INTRAVENOUS ONCE
Status: COMPLETED | OUTPATIENT
Start: 2020-10-13 | End: 2020-10-13

## 2020-10-13 RX ORDER — DEXTROSE MONOHYDRATE 25 G/50ML
12.5 INJECTION, SOLUTION INTRAVENOUS PRN
Status: DISCONTINUED | OUTPATIENT
Start: 2020-10-13 | End: 2020-10-14 | Stop reason: HOSPADM

## 2020-10-13 RX ORDER — LIDOCAINE HYDROCHLORIDE 20 MG/ML
INJECTION, SOLUTION INFILTRATION; PERINEURAL PRN
Status: DISCONTINUED | OUTPATIENT
Start: 2020-10-13 | End: 2020-10-13 | Stop reason: SDUPTHER

## 2020-10-13 RX ORDER — 0.9 % SODIUM CHLORIDE 0.9 %
500 INTRAVENOUS SOLUTION INTRAVENOUS ONCE
Status: COMPLETED | OUTPATIENT
Start: 2020-10-13 | End: 2020-10-13

## 2020-10-13 RX ORDER — PROPOFOL 10 MG/ML
INJECTION, EMULSION INTRAVENOUS
Status: DISCONTINUED
Start: 2020-10-13 | End: 2020-10-13

## 2020-10-13 RX ORDER — SODIUM CHLORIDE 0.9 % (FLUSH) 0.9 %
10 SYRINGE (ML) INJECTION EVERY 12 HOURS SCHEDULED
Status: DISCONTINUED | OUTPATIENT
Start: 2020-10-13 | End: 2020-10-14 | Stop reason: HOSPADM

## 2020-10-13 RX ORDER — EPINEPHRINE 1 MG/ML
INJECTION, SOLUTION, CONCENTRATE INTRAVENOUS
Status: COMPLETED
Start: 2020-10-13 | End: 2020-10-13

## 2020-10-13 RX ORDER — ACETAMINOPHEN 325 MG/1
650 TABLET ORAL EVERY 6 HOURS PRN
Status: DISCONTINUED | OUTPATIENT
Start: 2020-10-13 | End: 2020-10-14 | Stop reason: HOSPADM

## 2020-10-13 RX ORDER — PROPOFOL 10 MG/ML
INJECTION, EMULSION INTRAVENOUS PRN
Status: DISCONTINUED | OUTPATIENT
Start: 2020-10-13 | End: 2020-10-13 | Stop reason: SDUPTHER

## 2020-10-13 RX ORDER — MAGNESIUM HYDROXIDE 1200 MG/15ML
LIQUID ORAL PRN
Status: DISCONTINUED | OUTPATIENT
Start: 2020-10-13 | End: 2020-10-13 | Stop reason: ALTCHOICE

## 2020-10-13 RX ORDER — EPINEPHRINE 1 MG/ML
INJECTION, SOLUTION, CONCENTRATE INTRAVENOUS
Status: DISCONTINUED
Start: 2020-10-13 | End: 2020-10-13 | Stop reason: WASHOUT

## 2020-10-13 RX ORDER — SODIUM CHLORIDE 0.9 % (FLUSH) 0.9 %
10 SYRINGE (ML) INJECTION PRN
Status: DISCONTINUED | OUTPATIENT
Start: 2020-10-13 | End: 2020-10-14 | Stop reason: HOSPADM

## 2020-10-13 RX ORDER — ALBUMIN (HUMAN) 12.5 G/50ML
25 SOLUTION INTRAVENOUS ONCE
Status: COMPLETED | OUTPATIENT
Start: 2020-10-13 | End: 2020-10-13

## 2020-10-13 RX ORDER — 0.9 % SODIUM CHLORIDE 0.9 %
1000 INTRAVENOUS SOLUTION INTRAVENOUS ONCE
Status: COMPLETED | OUTPATIENT
Start: 2020-10-13 | End: 2020-10-13

## 2020-10-13 RX ORDER — DEXTROSE MONOHYDRATE 50 MG/ML
100 INJECTION, SOLUTION INTRAVENOUS PRN
Status: DISCONTINUED | OUTPATIENT
Start: 2020-10-13 | End: 2020-10-14 | Stop reason: HOSPADM

## 2020-10-13 RX ORDER — ONDANSETRON 2 MG/ML
4 INJECTION INTRAMUSCULAR; INTRAVENOUS ONCE
Status: COMPLETED | OUTPATIENT
Start: 2020-10-13 | End: 2020-10-13

## 2020-10-13 RX ORDER — CEFTRIAXONE 2 G/1
2 INJECTION, POWDER, FOR SOLUTION INTRAMUSCULAR; INTRAVENOUS EVERY 24 HOURS
Status: DISCONTINUED | OUTPATIENT
Start: 2020-10-13 | End: 2020-10-13 | Stop reason: CLARIF

## 2020-10-13 RX ORDER — SIMETHICONE 20 MG/.3ML
EMULSION ORAL PRN
Status: DISCONTINUED | OUTPATIENT
Start: 2020-10-13 | End: 2020-10-13 | Stop reason: ALTCHOICE

## 2020-10-13 RX ORDER — ONDANSETRON 2 MG/ML
4 INJECTION INTRAMUSCULAR; INTRAVENOUS EVERY 6 HOURS PRN
Status: DISCONTINUED | OUTPATIENT
Start: 2020-10-13 | End: 2020-10-14 | Stop reason: HOSPADM

## 2020-10-13 RX ORDER — PROMETHAZINE HYDROCHLORIDE 12.5 MG/1
12.5 TABLET ORAL EVERY 6 HOURS PRN
Status: DISCONTINUED | OUTPATIENT
Start: 2020-10-13 | End: 2020-10-14 | Stop reason: HOSPADM

## 2020-10-13 RX ORDER — PANTOPRAZOLE SODIUM 40 MG/10ML
40 INJECTION, POWDER, LYOPHILIZED, FOR SOLUTION INTRAVENOUS EVERY 12 HOURS
Status: DISCONTINUED | OUTPATIENT
Start: 2020-10-13 | End: 2020-10-14 | Stop reason: HOSPADM

## 2020-10-13 RX ORDER — PROPOFOL 10 MG/ML
10 INJECTION, EMULSION INTRAVENOUS
Status: DISCONTINUED | OUTPATIENT
Start: 2020-10-13 | End: 2020-10-14 | Stop reason: HOSPADM

## 2020-10-13 RX ORDER — CHLORHEXIDINE GLUCONATE 0.12 MG/ML
15 RINSE ORAL 2 TIMES DAILY
Status: DISCONTINUED | OUTPATIENT
Start: 2020-10-13 | End: 2020-10-14 | Stop reason: HOSPADM

## 2020-10-13 RX ORDER — SODIUM CHLORIDE 9 MG/ML
10 INJECTION INTRAVENOUS EVERY 12 HOURS
Status: DISCONTINUED | OUTPATIENT
Start: 2020-10-13 | End: 2020-10-14 | Stop reason: HOSPADM

## 2020-10-13 RX ORDER — SUCCINYLCHOLINE/SOD CL,ISO/PF 100 MG/5ML
SYRINGE (ML) INTRAVENOUS PRN
Status: DISCONTINUED | OUTPATIENT
Start: 2020-10-13 | End: 2020-10-13 | Stop reason: SDUPTHER

## 2020-10-13 RX ADMIN — PROPOFOL 30 MCG/KG/MIN: 10 INJECTION, EMULSION INTRAVENOUS at 17:38

## 2020-10-13 RX ADMIN — SODIUM CHLORIDE: 9 INJECTION, SOLUTION INTRAVENOUS at 14:44

## 2020-10-13 RX ADMIN — Medication 10 ML: at 08:41

## 2020-10-13 RX ADMIN — SODIUM CHLORIDE 1000 ML: 9 INJECTION, SOLUTION INTRAVENOUS at 00:10

## 2020-10-13 RX ADMIN — PANTOPRAZOLE SODIUM 40 MG: 40 INJECTION, POWDER, FOR SOLUTION INTRAVENOUS at 21:45

## 2020-10-13 RX ADMIN — SODIUM CHLORIDE 20 ML: 9 INJECTION, SOLUTION INTRAVENOUS at 05:12

## 2020-10-13 RX ADMIN — CEFTRIAXONE SODIUM 2 G: 2 INJECTION, POWDER, FOR SOLUTION INTRAMUSCULAR; INTRAVENOUS at 18:00

## 2020-10-13 RX ADMIN — LIDOCAINE HYDROCHLORIDE 60 MG: 20 INJECTION, SOLUTION INFILTRATION; PERINEURAL at 14:12

## 2020-10-13 RX ADMIN — Medication 100 MG: at 14:47

## 2020-10-13 RX ADMIN — CHLORHEXIDINE GLUCONATE 0.12% ORAL RINSE 15 ML: 1.2 LIQUID ORAL at 21:45

## 2020-10-13 RX ADMIN — ALBUMIN (HUMAN) 25 G: 0.25 INJECTION, SOLUTION INTRAVENOUS at 17:31

## 2020-10-13 RX ADMIN — ONDANSETRON 4 MG: 2 INJECTION INTRAMUSCULAR; INTRAVENOUS at 00:23

## 2020-10-13 RX ADMIN — OCTREOTIDE ACETATE 25 MCG/HR: 500 INJECTION, SOLUTION INTRAVENOUS; SUBCUTANEOUS at 08:40

## 2020-10-13 RX ADMIN — EPINEPHRINE: 1 INJECTION, SOLUTION, CONCENTRATE INTRAVENOUS at 17:39

## 2020-10-13 RX ADMIN — SODIUM CHLORIDE 500 ML: 9 INJECTION, SOLUTION INTRAVENOUS at 14:05

## 2020-10-13 RX ADMIN — INSULIN LISPRO 4 UNITS: 100 INJECTION, SOLUTION INTRAVENOUS; SUBCUTANEOUS at 04:00

## 2020-10-13 RX ADMIN — Medication 10 ML: at 21:45

## 2020-10-13 RX ADMIN — SODIUM CHLORIDE: 9 INJECTION, SOLUTION INTRAVENOUS at 14:06

## 2020-10-13 RX ADMIN — FAMOTIDINE 20 MG: 10 INJECTION, SOLUTION INTRAVENOUS at 17:40

## 2020-10-13 RX ADMIN — SODIUM CHLORIDE 20 ML: 9 INJECTION, SOLUTION INTRAVENOUS at 11:42

## 2020-10-13 RX ADMIN — PROPOFOL 250 MG: 10 INJECTION, EMULSION INTRAVENOUS at 14:12

## 2020-10-13 RX ADMIN — PANTOPRAZOLE SODIUM 40 MG: 40 INJECTION, POWDER, FOR SOLUTION INTRAVENOUS at 08:40

## 2020-10-13 RX ADMIN — PROPOFOL 30 MCG/KG/MIN: 10 INJECTION, EMULSION INTRAVENOUS at 23:33

## 2020-10-13 RX ADMIN — PROPOFOL 30 MG: 10 INJECTION, EMULSION INTRAVENOUS at 14:47

## 2020-10-13 ASSESSMENT — ENCOUNTER SYMPTOMS
WHEEZING: 0
ABDOMINAL PAIN: 1
ABDOMINAL DISTENTION: 1
NAUSEA: 0
VOMITING: 1
SHORTNESS OF BREATH: 0
TROUBLE SWALLOWING: 0
SORE THROAT: 0
DIARRHEA: 0
COUGH: 0
VOMITING: 0

## 2020-10-13 ASSESSMENT — PULMONARY FUNCTION TESTS
PIF_VALUE: 21
PIF_VALUE: 23
PIF_VALUE: 21
PIF_VALUE: 23
PIF_VALUE: 24
PIF_VALUE: 24
PIF_VALUE: 22
PIF_VALUE: 27
PIF_VALUE: 21
PIF_VALUE: 0
PIF_VALUE: 0
PIF_VALUE: 28
PIF_VALUE: 0
PIF_VALUE: 21
PIF_VALUE: 23
PIF_VALUE: 21
PIF_VALUE: 31
PIF_VALUE: 21
PIF_VALUE: 21
PIF_VALUE: 22
PIF_VALUE: 24

## 2020-10-13 ASSESSMENT — PAIN SCALES - GENERAL
PAINLEVEL_OUTOF10: 0
PAINLEVEL_OUTOF10: 10
PAINLEVEL_OUTOF10: 0

## 2020-10-13 NOTE — CONSULTS
Renal consult dictated    IAIN on CKD-3  Respiratory arrest intubated with EGD  Hyponatremia related to liver disease                      Cholangiocarcinoma  Ascites  Anemia Variceal bleeding   DM type-2    Plan watch I&O  Bmp daily  Pressor if need along with albumin for BP   Octreotide to decrease bleeding

## 2020-10-13 NOTE — ED NOTES
Dr. Jessica Terrazas [facility] updating patient on plan of care, obtaining signature for blood transfusion.       Jesus Dias RN  10/13/20 4512

## 2020-10-13 NOTE — FLOWSHEET NOTE
7a-7p summary    Pt admitted last night. Pt went to surgery for EGD this afternoon and ended up being intubated after procedure due to large blood clots. Per family request pt is on list to transfer to [facility] when bed available as this is where she was going to go for her chemotherapy. Dr. Anahy Rivera spoke with family and also Dr. Cuca Dickerson and Dr. Miguel Antunez and he is to start process in motion.  Spoke to [facility] via phone this afternoon and gave them a brief report on patient

## 2020-10-13 NOTE — ED NOTES
Patient resting on cart, denies needs or complaints at this time. No distress noted. Call light within reach. Family left ED, aware of plan of care for patient.       Balbina Morales RN  10/13/20 4218

## 2020-10-13 NOTE — CONSULTS
Critical Care Medicine  Consult Note      Reason for consultation: Respiratory failure and GI bleed    HISTORY OF PRESENT ILLNESS:    This is a 57-year-old -American female with known history of cholangiocarcinoma with liver mets and chronic kidney disease as well as hypertension with known history of esophageal varices who presented with hematemesis and severe anemia. Patient was admitted to intensive care unit, she was remaining hemodynamically stable but after blood transfusion her hemoglobin improved only slightly from 5.4-6. She was taken to the OR for an EGD today and was found to have severe large varices with a large clot in her stomach suggestive of gastric variceal bleed as well. The results were discussed with gastroenterologist, Dr. Faby Lyons, who indicated that patient had very large varices which were friable and bleeding easily with touch of the scope, and that she would ideally be a good candidate for TIPS procedure, but this may be difficult to do that given her malignancy and liver involvement. He discussed that with patient's family who have requested a transfer to the [facility]. Past Medical History:        Diagnosis Date    Ascites     Cancer [facility])     liver    CKD (chronic kidney disease)     Depression     Diabetes mellitus ([facility] [address]     History of blood transfusion     Hydronephrosis     Hypertension     Pneumonia     Upper GI bleed        Past Surgical History:        Procedure Laterality Date    PARACENTESIS Left 10/06/2020    3330 cc by Dr. Dena Morin. Social History:     reports that she has never smoked. She has never used smokeless tobacco. She reports that she does not drink alcohol or use drugs.     Family History:       Problem Relation Age of Onset    Cancer Mother         stomach    Cancer Father         throat    Arthritis Neg Hx     Asthma Neg Hx     Birth Defects Neg Hx     Depression Neg Hx     Diabetes Neg Hx     Early Death Neg Hx     Hearing Loss Neg Hx     Heart Disease Neg Hx     High Blood Pressure Neg Hx     High Cholesterol Neg Hx     Kidney Disease Neg Hx     Learning Disabilities Neg Hx     Mental Illness Neg Hx     Mental Retardation Neg Hx     Miscarriages / Stillbirths Neg Hx     Stroke Neg Hx     Substance Abuse Neg Hx     Vision Loss Neg Hx     Other Neg Hx        Allergies:  Patient has no known allergies. MEDICATIONS during current hospitalization:    Continuous Infusions:   dextrose      octreotide (SANDOSTATIN) infusion 25 mcg/hr (10/13/20 0840)       Scheduled Meds:   sodium chloride flush  10 mL Intravenous 2 times per day    pantoprazole  40 mg Intravenous Q12H    And    sodium chloride (PF)  10 mL Intravenous Q12H    insulin lispro  0-12 Units Subcutaneous TID WC    insulin lispro  0-6 Units Subcutaneous Nightly    cefTRIAXone  2 g Intravenous Q24H    EPINEPHrine PF        albumin human  25 g Intravenous Once    chlorhexidine  15 mL Mouth/Throat BID    famotidine (PEPCID) injection  20 mg Intravenous Daily    propofol           PRN Meds:sodium chloride flush, acetaminophen **OR** acetaminophen, promethazine **OR** ondansetron, glucose, dextrose, glucagon (rDNA), dextrose        REVIEW OF SYSTEMS:  As in history of present illness  Other 14 point review of system is negative. PHYSICAL EXAM:    Vitals:  /61   Pulse 102   Temp 99.2 °F (37.3 °C) (Temporal)   Resp 16   Ht 5' 3\" (1.6 m)   Wt 128 lb (58.1 kg)   SpO2 98%   BMI 22.67 kg/m²   General: Patient is currently somnolent but arousable to voice has not received propofol for sedation yet just starting that now but otherwise appeared comfortable in no significant distress   Head: Atraumatic , Normocephalic   Eyes: PERRL. No icteric sclera. No conjunctival injection. No discharge   ENT: No nasal  discharge. Pharynx clear. Neck:  Trachea midline. No thyromegaly, no JVD, No cervical adenopathy.   Chest : Moderately increased spontaneous breathing effort, symmetric bilateral excursions  Lung : Diminished breath sounds bilaterally, mainly over the bases  Heart[de-identified] Regular rhythm and rate. No mumur ,  Rub or gallop  ABD: Benign. Non-tender. Non-distended. No masses or organmegaly. Normal bowel sounds. EXT: No pitting edema both lower extremities , No Cyanosis No clubbing  Neuro: no focal weakness  Skin: Warm and dry. No erythema or rash on exposed extremities. .      Data Review  Recent Labs     10/13/20  0015 10/13/20  0924   WBC 11.8*  --    HGB 5.9* 6.4*   HCT 18.0* 19.7*     --       Recent Labs     10/13/20  0015 10/13/20  0247   *  --    K 4.5  --    CL 94*  --    CO2 25  --    BUN 78*  --    CREATININE 2.26*  --    GLUCOSE 123* 110       MV Settings:     Vt Ordered: 320 mL  Rate Set: 16 bmp  PEEP/CPAP: 5  Peak Inspiratory Pressure: 23 cmH2O  Mean Airway Pressure: 8.1 cmH20  I:E Ratio: 1:5.20    ABGs: No results for input(s): PHART, IWN3ANL, PO2ART, DXR7AJB, BEART, Z5EWIVTC, PLL9TTM in the last 72 hours. O2 Device: None (Room air)  Lab Results   Component Value Date    LACTA 1.6 10/05/2020    LACTA 1.7 10/04/2020       Radiology  Ct Abdomen Pelvis Wo Contrast Additional Contrast? None    Result Date: 10/5/2020  CT of the Abdomen and Pelvis without intravenous contrast medium History:  Diffuse abdominal pain. Recent fall. Abdominal distention. History renal disease. Technical Factors: CT imaging of the abdomen and pelvis were obtained and formatted as 5 mm contiguous axial images from the domes of the diaphragm to the symphysis pubis. Sagittal and coronal reconstructions were also obtained. Oral contrast medium:  None. Intravenous contrast medium:  None. Comparison:  None. Findings: Study limited secondary to lack of intravenous and oral contrast media. Lungs:  Small bilateral pleural effusions with dependent reticular and subsegmental atelectatic bilaterally, greater on right.  Liver:  Normal in size, shape, and attenuation. Small in size and nodular in contour. Multiple ill-defined and rounded areas of decreased attenuation throughout the liver. Largest area affected is within the right hepatic lobe with low attenuation foci measuring up to 4 x 6 cm. Bile Ducts:  Normal in caliber. Gallbladder:  No stones or wall thickening. Pancreas:  Normal without masses, cysts, ductal dilatation or calcification. Spleen:  Normal in size without masses or calcifications. No splenules. Kidneys:  Normal in size. Moderate right, with mild to moderate left pelvocaliectasis. Renal vascular calcification identified bilaterally with possible superimposed bilateral renal calculi measuring up to 2 mm. 2 cm cyst lateral midpole left kidney. Adrenals:  Normal. Small bowel:  Normal in caliber. Not well visualized secondary to lack of opacification. Appendix:  Not visualized. Colon:  Normal in caliber. Peritoneum:  No free air. Free fluid is identified within the right and anterior perihepatic spaces, subphrenic and perisplenic spaces, mesentery, Morison's pouch and bilateral paracolic gutters, and continuing to extending caudally into the pelvis. Vessels: Aorta normal in course and caliber. Probable bilateral femoral-popliteal bypass grafts. Lymph nodes:  Retroperitoneal:  No enlarged retroperitoneal lymph nodes. Mesenteric:  No enlarged mesenteric lymph nodes. Pelvic: No enlarged pelvic lymph nodes. Ureters: Not well visualized. Bladder: No wall thickening. Reproductive organs: No pelvic masses. Abdominal Wall:  No hernia identified  Diffuse edematous change of the abdominal and pelvic sidewalls. Bones:  No bone lesions. Diffuse disc space narrowing, T6-7 through T11-12 disc spaces, greatest at T7-8 through T9-10 disc spaces. No post operative changes. Multiple ill-defined and rounded low attenuation foci throughout the right and left hepatic lobes. Malignancy diagnosis of exclusion. Cirrhosis. Anasarca, with large volume ascites.  Moderate right and mild to moderate left hydronephrosis with bilateral renal vascular calcification, and possible superimposed bilateral renal calculi measuring up to 2 mm. No etiology of obstruction identified on this noncontrast enhanced study. All CT scans at this facility use dose modulation, iterative reconstruction, and/or weight based dosing when appropriate to reduce radiation dose to as low as reasonably achievable. Ct Head Wo Contrast    Result Date: 10/5/2020  EXAMINATION: CT HEAD WO CONTRAST  DATE AND TIME:10/5/2020 7:36 AM CLINICAL HISTORY: Severe headache.  metabolic encephalopathy  COMPARISON: None TECHNIQUE:Axial CT from skull base to vertex without  contrast..  All CT scans at this facility use dose modulation, iterative reconstruction, and/or weight based dosing when appropriate to reduce radiation dose to as low as reasonably achievable. FINDINGS CSF spaces: Ventricles are normal in size and position. Sulci are appropriate for age. Brain parenchyma: No  parenchymal mass,  mass effect,  signs of acute infarct, or extra-axial fluid. There are mild patchy nonspecific white matter hypodensities that may be related to microvascular ischemic change or  normal aging. Hemorrhage:No acute intracranial hemorrhage. Skull base: The bony calvarium and skull base are normal.   The visualized paranasal sinuses and mastoids are clear. NO ACUTE INTRACRANIAL PATHOLOGY. Xr Chest Portable    Result Date: 10/5/2020  EXAMINATION: XR CHEST PORTABLE CLINICAL HISTORY: COUGH, SHORTNESS OF BREATH, ALTERED MENTAL STATUS COMPARISONS: None available. FINDINGS: Osseous structures intact. Cardiopericardial silhouette normal. Aorta calcified. Right diaphragm elevated. Pulmonary vasculature indistinct. Ill-defined areas increase opacity at lung bases, greater on right. BILATERAL LOWER LUNG ATELECTASIS/PNEUMONIA VERSUS EDEMA.     Us Dup Abd Pel Retro Scrot Complete    Result Date: 10/5/2020  EXAMINATION: Duplex liver Doppler CLINICAL HISTORY: Abnormal liver function studies. Abnormal CT. FINDINGS: Arterial duplex of the liver performed. . The study also evaluated the liver which was extremely heterogeneous in echotexture. There is ascites. There were poorly defined areas of mixed echogenicity in the right lobe of the liver with nondescript borders. A focal mass or confluence of multiple masses within the liver suspected. In conglomeration these measured 6.5 x 8.5 cm in the central right lobe of the liver. Liver tumor suspected either metastatic or primary. Occasionally fatty liver and cirrhosis can have an appearance such as this and therefore confirmation with contrast-enhanced cross-sectional imaging (CT or MRI) recommended to confirm this, if not noted on outside studies. There was flow in the splenic vein to the level of the confluence. The portal vein at this level showed no flow. The vessel itself was small in caliber measuring 8 mm in diameter. No flow in the branches of the portal vein within the liver demonstrated. Arterial flow in the hepatic artery has elevated systolic velocity amplitude. Left hepatic artery systolic velocity is 648 cm/s. Flow in the common hepatic artery is 111 cm per sec. There is normal phasicity and waveform in the hepatic veins within the liver and in the IVC. The spleen was not evaluated. CHRONIC PORTAL VEIN THROMBOSIS. LIVER MASSES SUSPECTED CONSISTENT WITH MULTICENTRIC HEPATOCELLULAR CARCINOMA VERSUS METASTATIC DISEASE. CONFIRM WITH FURTHER CROSS-SECTIONAL IMAGING AS DISCUSSED IN THE REPORT. Us Guided Paracentesis    1. Status post technically successful ultrasound-guided paracentesis. Martha Salter is a Female of 70 years age, referred for Ultrasound Guided Paracentesis. PROCEDURE: Survey of the abdomen showed large amount of ascites fluid.  After obtaining informed consent, the patient was positioned

## 2020-10-13 NOTE — ED NOTES
Patient assisted to bedside commode with help of patient's daughter. Bowel movement x1. Assisted patient back to bed and repositioned for comfort. No distress noted at this time.       Satish Rucker RN  10/13/20 9048

## 2020-10-13 NOTE — PROGRESS NOTES
Spiritual Care Services     Summary of Visit:  Nurse requested  visit patient to provide support to patient's . Patient's  said he and his wife posses a strong candace in God and he believes God is going to heal his wife.  spoke to how he has sign God at work in the patient's life and in their marriage.  provided prayer and spiritual support. Spiritual Assessment/Intervention/Outcomes:    Encounter Summary  Services provided to[de-identified] Patient and family together  Referral/Consult From[de-identified] Nurse  Support System: Spouse, Children, Synagogue/candace community  Continue Visiting: Yes  Complexity of Encounter: Moderate  Length of Encounter: 15 minutes  Spiritual Assessment Completed: Yes  Routine  Type: Initial     Spiritual/Rastafari  Type: Spiritual support  Assessment: Tearful, Anxious  Intervention: Prayer, Sustaining presence/ Ministry of presence, Empowerment  Outcome: Connection/belonging, Encouraged, Hopeful                         Care Plan:        Spiritual Care Services   Electronically signed by Ryan Amezcua on 10/13/20 at 4:36 PM EDT     To reach a  for emotional and spiritual support, place an [facility] [place] consult request.   If a  is needed immediately, dial 0 and ask to page the on-call .

## 2020-10-13 NOTE — ANESTHESIA POSTPROCEDURE EVALUATION
Department of Anesthesiology  Postprocedure Note    Patient: Evangelina Grigsby  MRN: [id number]  YOB: 1945  Date of evaluation: 10/13/2020  Time:  3:35 PM     Procedure Summary     Date:  10/13/20 Room / Location:  [address] [place] [address] / [place] [facility]    Anesthesia Start:  6593 Anesthesia Stop:      Procedure:  EGD ESOPHAGOGASTRODUODENOSCOPY WITH BANDING to esophageal varices sites (N/A ) Diagnosis:  (GI Bleed   K92.2)    Surgeon:  Papito Ortiz MD Responsible Provider:  DARY Moody CRNA    Anesthesia Type:  MAC ASA Status:  3 - Emergent          Anesthesia Type: MAC    Harry Phase I: Harry Score: 10    Harry Phase II:      Last vitals: Reviewed and per EMR flowsheets.        Anesthesia Post Evaluation    Patient location during evaluation: ICU  Patient participation: complete - patient cannot participate  Level of consciousness: sedated and ventilated  Pain score: 0  Airway patency: patent  Nausea & Vomiting: no nausea and no vomiting  Complications: no  Cardiovascular status: blood pressure returned to baseline and hemodynamically stable  Respiratory status: acceptable, intubated, airway suctioned and ventilator  Hydration status: euvolemic

## 2020-10-13 NOTE — ED PROVIDER NOTES
[facility] ED  EMERGENCY DEPARTMENT ENCOUNTER      Pt Name: Lena Smith  MRN: [id number]  [place] 1945  Date of evaluation: 10/12/2020  Provider: Harrison Mead, [address] [place] [address]       Chief Complaint   Patient presents with    Urinary Catheter Problem     pt states it hurts         HISTORY OF PRESENT ILLNESS   (Location/Symptom, Timing/Onset, Context/Setting, Quality, Duration, Modifying Factors, Severity)  Note limiting factors. Lena Smith is a 70 y.o. female who presents to the emergency department . Patient comes in because her Hanks catheter was hurting her. Patient had it placed because she has uterine prolapse and hydronephrosis. She had a acute kidney injury and needed the catheter placed. Patient was also recently diagnosed with liver cancer. Catheter is draining fine. There is no bleeding. There is no purulent discharge coming from around the tube. HPI    Nursing Notes were reviewed. REVIEW OF SYSTEMS    (2-9 systems for level 4, 10 or more for level 5)     Review of Systems   Constitutional: Negative for activity change, appetite change and fatigue. HENT: Negative for congestion and sore throat. Eyes: Negative for pain and visual disturbance. Respiratory: Negative for chest tightness and shortness of breath. Cardiovascular: Negative for chest pain. Gastrointestinal: Negative for abdominal pain, nausea and vomiting. Endocrine: Negative for polydipsia. Genitourinary: Positive for difficulty urinating. Negative for flank pain and urgency. Musculoskeletal: Negative for gait problem and neck stiffness. Skin: Negative for rash. Neurological: Negative for weakness, light-headedness and headaches. Psychiatric/Behavioral: Negative for confusion and sleep disturbance. Except as noted above the remainder of the review of systems was reviewed and negative.        PAST MEDICAL HISTORY     Past Medical History:   Diagnosis Date    Depression Neg Hx     Vision Loss Neg Hx     Other Neg Hx           SOCIAL HISTORY       Social History     Socioeconomic History    Marital status:      Spouse name: None    Number of children: None    Years of education: None    Highest education level: None   Occupational History    None   Social Needs    Financial resource strain: None    Food insecurity     Worry: None     Inability: None    Transportation needs     Medical: None     Non-medical: None   Tobacco Use    Smoking status: Never Smoker    Smokeless tobacco: Never Used   Substance and Sexual Activity    Alcohol use: No    Drug use: No    Sexual activity: Yes     Partners: Male   Lifestyle    Physical activity     Days per week: None     Minutes per session: None    Stress: None   Relationships    Social connections     Talks on phone: None     Gets together: None     Attends Mandaen service: None     Active member of club or organization: None     Attends meetings of clubs or organizations: None     Relationship status: None    Intimate partner violence     Fear of current or ex partner: None     Emotionally abused: None     Physically abused: None     Forced sexual activity: None   Other Topics Concern    None   Social History Narrative    None       SCREENINGS        Elliot Coma Scale  Eye Opening: Spontaneous  Best Verbal Response: Oriented  Best Motor Response: Obeys commands  Webster Coma Scale Score: 15               PHYSICAL EXAM    (up to 7 for level 4, 8 or more for level 5)     ED Triage Vitals [10/12/20 1535]   BP Temp Temp Source Pulse Resp SpO2 Height Weight   (!) 146/76 97.1 °F (36.2 °C) Temporal 89 19 99 % 5' 3\" (1.6 m) 128 lb (58.1 kg)       Physical Exam  Vitals signs and nursing note reviewed. Constitutional:       General: She is not in acute distress. Appearance: She is well-developed. She is not diaphoretic. HENT:      Head: Normocephalic and atraumatic.       Right Ear: External ear normal.      Left Ear: External ear normal.      Mouth/Throat:      Pharynx: No oropharyngeal exudate. Eyes:      Conjunctiva/sclera: Conjunctivae normal.      Pupils: Pupils are equal, round, and reactive to light. Neck:      Musculoskeletal: Normal range of motion and neck supple. Thyroid: No thyromegaly. Vascular: No JVD. Trachea: No tracheal deviation. Cardiovascular:      Rate and Rhythm: Normal rate. Heart sounds: Normal heart sounds. No murmur. Pulmonary:      Effort: Pulmonary effort is normal. No respiratory distress. Breath sounds: Normal breath sounds. No wheezing. Abdominal:      General: Bowel sounds are normal. There is distension. Palpations: Abdomen is soft. Tenderness: There is no abdominal tenderness. There is no guarding. Genitourinary:     Comments: Hanks catheter in urethra. Clitoris extremely red and swollen. Uterine prolapsed  Musculoskeletal: Normal range of motion. Skin:     General: Skin is warm and dry. Findings: No rash. Neurological:      Mental Status: She is alert and oriented to person, place, and time. Cranial Nerves: No cranial nerve deficit. Psychiatric:         Behavior: Behavior normal.         DIAGNOSTIC RESULTS     EKG: All EKG's are interpreted by the Emergency Department Physician who either signs or Co-signs this chart in the absence of a cardiologist.        RADIOLOGY:   Non-plain film images such as CT, Ultrasound and MRI are read by the radiologist. Plain radiographic images are visualized and preliminarily interpreted by the emergency physician with the below findings:        Interpretation per the Radiologist below, if available at the time of this note:    No orders to display         ED BEDSIDE ULTRASOUND:   Performed by ED Physician - none    LABS:  Labs Reviewed - No data to display    All other labs were within normal range or not returned as of this dictation.     EMERGENCY DEPARTMENT COURSE and DIFFERENTIAL DIAGNOSIS/MDM:   Vitals:    Vitals:    10/12/20 1535   BP: (!) 146/76   Pulse: 89   Resp: 19   Temp: 97.1 °F (36.2 °C)   TempSrc: Temporal   SpO2: 99%   Weight: 128 lb (58.1 kg)   Height: 5' 3\" (1.6 m)       Patient comes in with pain at the site of her Hanks catheter. I believe that the catheter is rubbing on her genitalia. We did adjusted and secured the catheter to the very top of her thigh so the tube would not be rubbing. We did put some lidocaine cream on the  Clitoris. Hopefully by keeping the tube away from her genitalia, it will not hurt so much. Patient needs to follow-up with gynecology regarding the uterine prolapse. MDM      REASSESSMENT          CRITICAL CARE TIME   Total Critical Care time was 0 minutes, excluding separately reportable procedures. There was a high probability of clinically significant/life threatening deterioration in the patient's condition which required my urgent intervention. CONSULTS:  None    PROCEDURES:  Unless otherwise noted below, none     Procedures        FINAL IMPRESSION      1. Problem with Hanks catheter, initial encounter ([facility] [address]    2. Uterine prolapse          DISPOSITION/PLAN   DISPOSITION Decision To Discharge 10/12/2020 04:22:41 PM      PATIENT REFERRED TO:  [address] [place]  [phone]  Schedule an appointment as soon as possible for a visit   or your gynecologist      DISCHARGE MEDICATIONS:  Discharge Medication List as of 10/12/2020  4:26 PM        Controlled Substances Monitoring:     No flowsheet data found.     (Please note that portions of this note were completed with a voice recognition program.  Efforts were made to edit the dictations but occasionally words are mis-transcribed.)    Darius Plunkett DO (electronically signed)  Attending Emergency Physician            Darius Plunkett DO  10/12/20 2035

## 2020-10-13 NOTE — PROGRESS NOTES
Progress Note  Date:10/13/2020       Room:IC05/IC05-01  Patient Name:Jaye Lynn     YOB: 1945     Age:75 y.o. Subjective    Subjective:  Symptoms:  She reports malaise and weakness. No shortness of breath, cough, chest pain, headache, chest pressure, anorexia, diarrhea or anxiety. Diet:  No nausea or vomiting. Review of Systems   Respiratory: Negative for cough and shortness of breath. Cardiovascular: Negative for chest pain. Gastrointestinal: Negative for anorexia, diarrhea, nausea and vomiting. Neurological: Positive for weakness. Objective         Vitals Last 24 Hours:  TEMPERATURE:  Temp  Av.5 °F (36.9 °C)  Min: 97.1 °F (36.2 °C)  Max: 99 °F (37.2 °C)  RESPIRATIONS RANGE: Resp  Av.9  Min: 12  Max: 20  PULSE OXIMETRY RANGE: SpO2  Av.4 %  Min: 98 %  Max: 100 %  PULSE RANGE: Pulse  Av.1  Min: 89  Max: 104  BLOOD PRESSURE RANGE: Systolic (85EMT), CEI:390 , Min:94 , BJI:510   ; Diastolic (15BHT), OAJ:95, Min:44, Max:76    I/O (24Hr): Intake/Output Summary (Last 24 hours) at 10/13/2020 1120  Last data filed at 10/13/2020 0841  Gross per 24 hour   Intake 10 ml   Output --   Net 10 ml     Objective:  General Appearance:  Comfortable, well-appearing and in no acute distress. Vital signs: (most recent): Blood pressure (!) 105/44, pulse 102, temperature 98.6 °F (37 °C), resp. rate 12, height 5' 3\" (1.6 m), weight 128 lb (58.1 kg), SpO2 100 %. HEENT: Normal HEENT exam.    Lungs:  Normal effort. Heart: Regular rhythm. Positive for murmur. Abdomen: Abdomen is distended. Bowel sounds are normal.   There is no epigastric area or suprapubic area tenderness. Pulses: Distal pulses are intact. Neurological: Patient is alert. Pupils:  Pupils are equal, round, and reactive to light. Skin:  Dry.       Labs/Imaging/Diagnostics    Labs:  CBC:  Recent Labs     10/13/20  0015 10/13/20  0924   WBC 11.8*  --    RBC 2.61*  --    HGB 5.9* 6.4*   HCT 18.0* 19.7*   MCV 68.9*  --    RDW 22.3*  --      --      CHEMISTRIES:  Recent Labs     10/13/20  0015 10/13/20  0247   *  --    K 4.5  --    CL 94*  --    CO2 25  --    BUN 78*  --    CREATININE 2.26*  --    GLUCOSE 123* 110     PT/INR:  Recent Labs     10/13/20  0015   PROTIME 16.2*   INR 1.3     APTT:  Recent Labs     10/13/20  0015   APTT 33.6     LIVER PROFILE:  Recent Labs     10/13/20  0015   AST 63*   ALT 30   BILITOT 0.6   ALKPHOS 324*       Imaging Last 24 Hours:  No results found.   Assessment//Plan           Hospital Problems           Last Modified POA    Chronic kidney disease, stage III (moderate) 10/13/2020 Yes    Uncontrolled type 2 diabetes mellitus with hyperglycemia ([facility] [address] 10/13/2020 Yes    Upper GI bleed 10/13/2020 Yes    HTN (hypertension) 10/13/2020 Yes    Ascites 10/13/2020 Yes        Lab Results   Component Value Date    WBC 11.8 (H) 10/13/2020    HGB 6.4 (LL) 10/13/2020    HCT 19.7 (LL) 10/13/2020    MCV 68.9 (L) 10/13/2020     10/13/2020     Lab Results   Component Value Date     10/13/2020    K 4.5 10/13/2020    K 4.0 10/09/2020    CL 94 10/13/2020    CO2 25 10/13/2020    BUN 78 10/13/2020    CREATININE 2.26 10/13/2020    GLUCOSE 110 10/13/2020    GLUCOSE 576 07/13/2020    CALCIUM 6.9 10/13/2020        Assessment & Plan  1) GI beed,most likely upper bleed  IV protonix  Needs EGD    S/p blood transfusion  Give additional unitp  GI eval  IVF  2) cholangiocarcinoma with mets  Palliative care  3) fluid overload/CKD   neprhology eval  4) DM 2 withy hyperglycemic  Avoid hypoglycemia  C/w SSI      Electronically signed by Rossy Haq MD on 10/13/20 at 11:20 AM EDT

## 2020-10-13 NOTE — H&P
[facility]  MEDICINE    HISTORY AND PHYSICAL EXAM    PATIENT NAME:  Juanjose Bass    MRN:  [id number]  SERVICE DATE:  10/13/2020   SERVICE TIME:  4:02 AM    Primary Care Physician: Sharon Navarro MD         SUBJECTIVE  CHIEF COMPLAINT:  Bloody emesis    HPI:  This is a 70 y.o. female  w PMH CKD stage 3-4 due to DM, HTN with newly diagnosed cholangio / liver cancerwho presents with bloody emesis. Emesis    This is a new problem. The current episode started today. The problem occurs less than 2 times per day. The problem has been unchanged. Emesis appearance: dark red blood. There has been no fever. Associated symptoms include abdominal pain (suprapubic area). Pertinent negatives include no chest pain, chills, coughing, diarrhea, fever or headaches. Risk factors: known liver CA. She has tried nothing for the symptoms. PAST MEDICAL HISTORY:    Past Medical History:   Diagnosis Date    Depression     Hypertension      PAST SURGICAL HISTORY:    Past Surgical History:   Procedure Laterality Date    PARACENTESIS Left 10/06/2020    3330 cc by Dr. Vanessa Olson.      FAMILY HISTORY:    Family History   Problem Relation Age of Onset    Cancer Mother         stomach    Cancer Father         throat    Arthritis Neg Hx     Asthma Neg Hx     Birth Defects Neg Hx     Depression Neg Hx     Diabetes Neg Hx     Early Death Neg Hx     Hearing Loss Neg Hx     Heart Disease Neg Hx     High Blood Pressure Neg Hx     High Cholesterol Neg Hx     Kidney Disease Neg Hx     Learning Disabilities Neg Hx     Mental Illness Neg Hx     Mental Retardation Neg Hx     Miscarriages / Stillbirths Neg Hx     Stroke Neg Hx     Substance Abuse Neg Hx     Vision Loss Neg Hx     Other Neg Hx      SOCIAL HISTORY:    Social History     Socioeconomic History    Marital status:      Spouse name: Not on file    Number of children: Not on file    Years of education: Not on file    Highest education level: Not on file   Occupational History    Not on file   Social Needs    Financial resource strain: Not on file    Food insecurity     Worry: Not on file     Inability: Not on file    Transportation needs     Medical: Not on file     Non-medical: Not on file   Tobacco Use    Smoking status: Never Smoker    Smokeless tobacco: Never Used   Substance and Sexual Activity    Alcohol use: No    Drug use: No    Sexual activity: Yes     Partners: Male   Lifestyle    Physical activity     Days per week: Not on file     Minutes per session: Not on file    Stress: Not on file   Relationships    Social connections     Talks on phone: Not on file     Gets together: Not on file     Attends Jew service: Not on file     Active member of club or organization: Not on file     Attends meetings of clubs or organizations: Not on file     Relationship status: Not on file    Intimate partner violence     Fear of current or ex partner: Not on file     Emotionally abused: Not on file     Physically abused: Not on file     Forced sexual activity: Not on file   Other Topics Concern    Not on file   Social History Narrative    Not on file     MEDICATIONS:   Prior to Admission medications    Medication Sig Start Date End Date Taking?  Authorizing Provider   insulin lispro (HUMALOG) 100 UNIT/ML injection vial Inject 10 Units into the skin 3 times daily (before meals) 10/10/20   Fausto Cruz MD   lactulose (CHRONULAC) 10 GM/15ML solution Take 30 mLs by mouth 2 times daily 10/9/20   Fausto Cruz MD   hydrALAZINE (APRESOLINE) 50 MG tablet Take 1 tablet by mouth every 8 hours 10/9/20   Fausto Cruz MD   insulin glargine (LANTUS) 100 UNIT/ML injection vial Inject 20 Units into the skin daily (before lunch) 10/9/20   Fausto Cruz MD   torsemide (DEMADEX) 20 MG tablet Take 2 tablets by mouth daily 10/10/20   Fausto Cruz MD   albuterol sulfate HFA (PROVENTIL HFA) 108 (90 Base) MCG/ACT inhaler Inhale 2 puffs into the lungs REVIEW see below     Neutrophils % 81.2 %    Lymphocytes % 7.3 %    Monocytes % 9.8 %    Eosinophils % 0.7 %    Basophils % 1.0 %    Neutrophils Absolute 9.5 (H) 1.4 - 6.5 K/uL    Lymphocytes Absolute 0.9 (L) 1.0 - 4.8 K/uL    Monocytes Absolute 1.2 (H) 0.2 - 0.8 K/uL    Eosinophils Absolute 0.1 0.0 - 0.7 K/uL    Basophils Absolute 0.1 0.0 - 0.2 K/uL    Toxic Granulation 1+     Vacuolated Neutrophils Present     Anisocytosis 2+     Microcytes 1+     Hypochromia 2+     Poikilocytes 2+     Target Cells 2+    Protime-INR    Collection Time: 10/13/20 12:15 AM   Result Value Ref Range    Protime 16.2 (H) 12.3 - 14.9 sec    INR 1.3    APTT    Collection Time: 10/13/20 12:15 AM   Result Value Ref Range    aPTT 33.6 24.4 - 36.8 sec   TYPE AND SCREEN    Collection Time: 10/13/20 12:15 AM   Result Value Ref Range    ABO/Rh A POS     Antibody Screen NEG    PREPARE RBC (CROSSMATCH), 2 Units    Collection Time: 10/13/20 12:15 AM   Result Value Ref Range    Product Code Blood Bank [id number]     Description Blood Bank Red Blood Cells, Leuko-reduced     Unit Number [id number]     Dispense Status Blood Bank selected     Product Code Blood Bank [id number]     Description Blood Bank Red Blood Cells, Leuko-reduced     Unit Number [id number]     Dispense Status Blood Bank issued    POCT Glucose    Collection Time: 10/13/20  2:34 AM   Result Value Ref Range    POC Glucose 110 60 - 115 mg/dl    Performed on ACCU-CHEK    POCT Glucose    Collection Time: 10/13/20  2:47 AM   Result Value Ref Range    Glucose 110 mg/dL    QC OK? yes        IMAGING:  No results found. VTE Prophylaxis: bleeding. ASSESSMENT AND PLAN  Active Problems:    Upper GI bleed   Bloody emesis. H/H  5.9 / 18  Most recent Hgb 8  2 episodes bloody emesis. Feels fatigue,  No dizziness or HA. No falls or syncope. Plan: Admit   NPO    Transfuse 2 u   GI consult. Protonix. Ascites   Paracentesis 3300 ml on Oct 6.     Abdomen is distended,  Some

## 2020-10-13 NOTE — PROGRESS NOTES
Returned to icu room 5 via bed following egd. Vitals monitored by rn and airway remained intubated and maintained by crna during transport and patient arrived to icu in stable condition. Transfer of care hand off given to receiving rn and discussed plan of care. 3 bands applied by physician to varices site. See physician op report

## 2020-10-13 NOTE — PROGRESS NOTES
Received consult  Pt off floor / ICU getting EGD  Pt got blood, IVF  Bun/cr noted. No new recs today.    Full consult in am

## 2020-10-13 NOTE — CONSULTS
Inpatient consult to GI  Consult performed by: Rios Dykes MD  Consult ordered by: Becca Lebron, APRN - CNP          Patient Name: Jeremy Barba  Admit Date: 10/12/2020 11:58 PM  MR #: [id number]  : 1945    Attending Physician: Bethel Wong MD  Reason for consult: GIB    History of Presenting Illness:      Jeremy Barba is a 70 y.o. female on hospital day 0 with a history of hypertension, depression, cholangiocarcinoma. Past surgical history includes recent paracentesis. Family history as noted patient's mother gastric cancer. Social history denies nicotine, EtOH, or illicit drug use. history Obtained From:  patient, electronic medical record  GI consult for GI bleed-patient was admitted to the ICU with complaints of hematemesis and anemia, prior to arrival patient reports she had approximately 5 episodes of maroon-colored blood with clots, she has had no further hematemesis or melena since arrival..  No previous history of GI bleeding. Recently hospitalized for pneumonia and found to have ascites, underwent paracentesis for greater than 3 L, SAAG 2.0 suggestive of portal HTN. Patient carries a diagnosis of cholangiocarcinoma she states she was diagnosed in July and has been followed at [facility], no recent CT of the abdomen and pelvis from early October noted multiple right and left hepatic lobe liver lesions with concern for metastatic disease, and cirrhosis. On arrival her hemoglobin is 5.9, 3 days ago it was 8 she has received 1 unit of packed red blood cells and repeat hemoglobin is pending. Patient has been given Protonix twice daily and is on octreotide infusion. Noted INR of 1.3 and platelets 978. History:      Past Medical History:   Diagnosis Date    Depression     Hypertension      Past Surgical History:   Procedure Laterality Date    PARACENTESIS Left 10/06/2020    3330 cc by Dr. Damaris Giron.      Family History  Family History   Problem Relation Age of Onset    Cancer Mother stomach    Cancer Father         throat    Arthritis Neg Hx     Asthma Neg Hx     Birth Defects Neg Hx     Depression Neg Hx     Diabetes Neg Hx     Early Death Neg Hx     Hearing Loss Neg Hx     Heart Disease Neg Hx     High Blood Pressure Neg Hx     High Cholesterol Neg Hx     Kidney Disease Neg Hx     Learning Disabilities Neg Hx     Mental Illness Neg Hx     Mental Retardation Neg Hx     Miscarriages / Stillbirths Neg Hx     Stroke Neg Hx     Substance Abuse Neg Hx     Vision Loss Neg Hx     Other Neg Hx      [] Unable to obtain due to ventilated and/ or neurologic status  Social History     Socioeconomic History    Marital status:      Spouse name: Not on file    Number of children: Not on file    Years of education: Not on file    Highest education level: Not on file   Occupational History    Not on file   Social Needs    Financial resource strain: Not on file    Food insecurity     Worry: Not on file     Inability: Not on file    Transportation needs     Medical: Not on file     Non-medical: Not on file   Tobacco Use    Smoking status: Never Smoker    Smokeless tobacco: Never Used   Substance and Sexual Activity    Alcohol use: No    Drug use: No    Sexual activity: Yes     Partners: Male   Lifestyle    Physical activity     Days per week: Not on file     Minutes per session: Not on file    Stress: Not on file   Relationships    Social connections     Talks on phone: Not on file     Gets together: Not on file     Attends [employer] service: Not on file     Active member of club or organization: Not on file     Attends meetings of clubs or organizations: Not on file     Relationship status: Not on file    Intimate partner violence     Fear of current or ex partner: Not on file     Emotionally abused: Not on file     Physically abused: Not on file     Forced sexual activity: Not on file   Other Topics Concern    Not on file   Social History Narrative    Not on file      [] Unable to obtain due to ventilated and/ or neurologic status    Home Medications:      Medications Prior to Admission: insulin lispro (HUMALOG) 100 UNIT/ML injection vial, Inject 10 Units into the skin 3 times daily (before meals)  hydrALAZINE (APRESOLINE) 50 MG tablet, Take 1 tablet by mouth every 8 hours  insulin glargine (LANTUS) 100 UNIT/ML injection vial, Inject 20 Units into the skin daily (before lunch)  albuterol sulfate HFA (PROVENTIL HFA) 108 (90 Base) MCG/ACT inhaler, Inhale 2 puffs into the lungs every 6 hours as needed for Wheezing  amLODIPine (NORVASC) 10 MG tablet, Take 10 mg by mouth daily  aspirin 81 MG tablet, Take 81 mg by mouth daily  lactulose (CHRONULAC) 10 GM/15ML solution, Take 30 mLs by mouth 2 times daily  torsemide (DEMADEX) 20 MG tablet, Take 2 tablets by mouth daily  cefUROXime (CEFTIN) 250 MG tablet, Take 1 tablet by mouth 2 times daily for 7 days    Current Hospital Medications:   Scheduled Meds:   sodium chloride flush  10 mL Intravenous 2 times per day    pantoprazole  40 mg Intravenous Q12H    And    sodium chloride (PF)  10 mL Intravenous Q12H    insulin lispro  0-12 Units Subcutaneous TID WC    insulin lispro  0-6 Units Subcutaneous Nightly     Continuous Infusions:   dextrose      octreotide (SANDOSTATIN) infusion 25 mcg/hr (10/13/20 0840)     PRN Meds:.sodium chloride flush, acetaminophen **OR** acetaminophen, promethazine **OR** ondansetron, glucose, dextrose, glucagon (rDNA), dextrose   dextrose      octreotide (SANDOSTATIN) infusion 25 mcg/hr (10/13/20 0840)      Allergies:   No Known Allergies   Review of Systems:       [x] CV, Resp, Neuro, , and all other systems reviewed and negative other than listed in HPI.         Objective Findings:     Vitals:   Vitals:    10/13/20 0530 10/13/20 0630 10/13/20 0700 10/13/20 0731   BP: (!) 111/57  (!) 105/44    Pulse:  102 101 102   Resp:  12 12    Temp:       TempSrc:       SpO2: 98% 100% 100%    Weight: Height:            Physical Examination:  General: alert  HEENT: Normocephalic, no scleral icterus. Neck: No JVD. Heart: Regular, no murmur, no rub/gallop. No RV heave. Lungs: Clear to ascultation, no rales/wheezing/rhonchi. Good chest wall excursion. Abdomen: Appearance:, Distension , Soft, generalized tenderness , Bowel sounds normal  Extremities: No clubbing/cyanosis, no edema. Skin: Warm, dry, normal turgor, no rash, no bruise, no petichiae. Neuro: No myoclonus or tremor. Psych: Normal affect    Results/ Medications reviewed 10/13/2020, 9:20 AM     Laboratory, Microbiology, Pathology, Radiology, Cardiology, Medications and Transcriptions reviewed  Scheduled Meds:   sodium chloride flush  10 mL Intravenous 2 times per day    pantoprazole  40 mg Intravenous Q12H    And    sodium chloride (PF)  10 mL Intravenous Q12H    insulin lispro  0-12 Units Subcutaneous TID WC    insulin lispro  0-6 Units Subcutaneous Nightly     Continuous Infusions:   dextrose      octreotide (SANDOSTATIN) infusion 25 mcg/hr (10/13/20 0840)       Recent Labs     10/13/20  0015   WBC 11.8*   HGB 5.9*   HCT 18.0*   MCV 68.9*        Recent Labs     10/13/20  0015   *   K 4.5   CL 94*   CO2 25   BUN 78*   CREATININE 2.26*     Recent Labs     10/13/20  0015   AST 63*   ALT 30   BILITOT 0.6   ALKPHOS 324*     No results for input(s): LIPASE, AMYLASE in the last 72 hours. Recent Labs     10/13/20  0015   PROT 4.7*   INR 1.3     Ct Abdomen Pelvis Wo Contrast Additional Contrast? None    Result Date: 10/5/2020  CT of the Abdomen and Pelvis without intravenous contrast medium History:  Diffuse abdominal pain. Recent fall. Abdominal distention. History renal disease. Technical Factors: CT imaging of the abdomen and pelvis were obtained and formatted as 5 mm contiguous axial images from the domes of the diaphragm to the symphysis pubis. Sagittal and coronal reconstructions were also obtained.  Oral contrast medium: None. Intravenous contrast medium:  None. Comparison:  None. Findings: Study limited secondary to lack of intravenous and oral contrast media. Lungs:  Small bilateral pleural effusions with dependent reticular and subsegmental atelectatic bilaterally, greater on right. Liver:  Normal in size, shape, and attenuation. Small in size and nodular in contour. Multiple ill-defined and rounded areas of decreased attenuation throughout the liver. Largest area affected is within the right hepatic lobe with low attenuation foci measuring up to 4 x 6 cm. Bile Ducts:  Normal in caliber. Gallbladder:  No stones or wall thickening. Pancreas:  Normal without masses, cysts, ductal dilatation or calcification. Spleen:  Normal in size without masses or calcifications. No splenules. Kidneys:  Normal in size. Moderate right, with mild to moderate left pelvocaliectasis. Renal vascular calcification identified bilaterally with possible superimposed bilateral renal calculi measuring up to 2 mm. 2 cm cyst lateral midpole left kidney. Adrenals:  Normal. Small bowel:  Normal in caliber. Not well visualized secondary to lack of opacification. Appendix:  Not visualized. Colon:  Normal in caliber. Peritoneum:  No free air. Free fluid is identified within the right and anterior perihepatic spaces, subphrenic and perisplenic spaces, mesentery, Morison's pouch and bilateral paracolic gutters, and continuing to extending caudally into the pelvis. Vessels: Aorta normal in course and caliber. Probable bilateral femoral-popliteal bypass grafts. Lymph nodes:  Retroperitoneal:  No enlarged retroperitoneal lymph nodes. Mesenteric:  No enlarged mesenteric lymph nodes. Pelvic: No enlarged pelvic lymph nodes. Ureters: Not well visualized. Bladder: No wall thickening. Reproductive organs: No pelvic masses. Abdominal Wall:  No hernia identified  Diffuse edematous change of the abdominal and pelvic sidewalls. Bones:  No bone lesions.  Diffuse disc space Osseous structures intact. Cardiopericardial silhouette normal. Aorta calcified. Right diaphragm elevated. Pulmonary vasculature indistinct. Ill-defined areas increase opacity at lung bases, greater on right. BILATERAL LOWER LUNG ATELECTASIS/PNEUMONIA VERSUS EDEMA. Us Dup Abd Pel Retro Scrot Complete    Result Date: 10/5/2020  EXAMINATION: Duplex liver Doppler CLINICAL HISTORY: Abnormal liver function studies. Abnormal CT. FINDINGS: Arterial duplex of the liver performed. . The study also evaluated the liver which was extremely heterogeneous in echotexture. There is ascites. There were poorly defined areas of mixed echogenicity in the right lobe of the liver with nondescript borders. A focal mass or confluence of multiple masses within the liver suspected. In conglomeration these measured 6.5 x 8.5 cm in the central right lobe of the liver. Liver tumor suspected either metastatic or primary. Occasionally fatty liver and cirrhosis can have an appearance such as this and therefore confirmation with contrast-enhanced cross-sectional imaging (CT or MRI) recommended to confirm this, if not noted on outside studies. There was flow in the splenic vein to the level of the confluence. The portal vein at this level showed no flow. The vessel itself was small in caliber measuring 8 mm in diameter. No flow in the branches of the portal vein within the liver demonstrated. Arterial flow in the hepatic artery has elevated systolic velocity amplitude. Left hepatic artery systolic velocity is 001 cm/s. Flow in the common hepatic artery is 111 cm per sec. There is normal phasicity and waveform in the hepatic veins within the liver and in the IVC. The spleen was not evaluated. CHRONIC PORTAL VEIN THROMBOSIS. LIVER MASSES SUSPECTED CONSISTENT WITH MULTICENTRIC HEPATOCELLULAR CARCINOMA VERSUS METASTATIC DISEASE. CONFIRM WITH FURTHER CROSS-SECTIONAL IMAGING AS DISCUSSED IN THE REPORT. Us Guided Paracentesis    1.   Status post technically successful ultrasound-guided paracentesis. Janee Gonzalez is a Female of 70 years age, referred for Ultrasound Guided Paracentesis. PROCEDURE: Survey of the abdomen showed large amount of ascites fluid. After obtaining informed consent, the patient was positioned supine on the sonography table. Using ultrasound, the skin over the left hemiabdomen was locally anesthetized with 1% lidocaine. Following that, a Yueh needle was advanced into the fluid pocket using ultrasound visualization. 2330cc, of slightly turbid pink fluid were aspirated and sent for cytology, and pathology. The needle was removed, and hemostasis was obtained with pressure. A Band-Aid was placed. Post procedure images did not demonstrate hemorrhage at the target site. The patient tolerated the procedure well. The patient left the department in good condition. A radiology nurse was in presence monitoring vital signs, assisting throughout the procedure. Xr Hip 2-3 Vw W Pelvis Left    Result Date: 10/5/2020  EXAMINATION: AP pelvis left hip. 3 films CLINICAL HISTORY: Trauma. Posterior left hip pain FINDINGS: AP pelvis and hip and a shoot through lateral left hip performed. The bone is adequately mineralized and grossly intact. No fracture or dislocation. Moderate degenerative changes overlie the hip joints bilaterally. Mild degenerative changes in the lower lumbar spine. Vascular calcifications noted in the pelvis and proximal thighs bilaterally. MODERATE DEGENERATIVE HIP DISEASE. NO FRACTURE OR DISLOCATION.        Impression:   82-year-old female admitted with hematemesis, seen and examined in the intensive care unit, she is tachycardic, normotensive, carries a recent diagnosis of cholangiocarcinoma with metastatic liver cancer has been followed by [facility], was recently hospitalized for pneumonia and found to have ascites, 10/6 underwent paracentesis for > 3 L,  SAAG 2.0 ,previous CT scan noted cirrhosis and multiple liver lesions,  INR and platelets are normal, has been started on octreotide and been given IV Protonix twice daily. Plan:   1. Ongoing optimization and ICU management  2. Will need endoscopic evaluation timing to be determined by clinical course  3. Serial H&H, trend and transfuse as appropriate, do not over transfuse  4. Agree with and continue octreotide infusion and IV PPI  5. Continue antibiotics for SBP prophylaxis  Comments: Thank you for allowing us to participate in the care of this patient. Will continue to follow. Please call if questions or concerns arise. A/P  Regarding assessment and plan. Reviewed chart, and examined the patient. Patient admitted owing to hematemesis and noted underlying cirrhosis with prior decompensation in term of ascites with  recent paracentesis. Noted history of cholangiocarcinoma diagnosed and been managed at [facility].  Continue octreotide/PPI, serial H&H and transfuse to keep hemoglobin above 7.5. Continue SBP prophylaxis. We will proceed with upper endoscopy. Ongoing optimization in the ICU. Further recommendation pending EGD results  Thalia Pichardo MD  Please note this report has been partially produced using speech recognition software and may cause contain errors related to that system including grammar, punctuation and spelling as well as words and phrases that may seem inappropriate. If there are questions or concerns please feel free to contact me to clarify. Addendum/ Late entry 16:00:  Patient is status post EGD with large esophageal varices identified in addition to large clot in the gastric fundus that could not be washed out as adherent to the mucosa which raise the question of underlying gastric varix vs other. Additional clots were noted in the antrum and the duodenum that could be watched out easily.   Patient is S/P banding of the esophageal varices with partial collapse of the esophageal varices. Of note, patient was diagnosed recently with cholangiocarcinoma and been managed at [facility].  Discussed with the patient's  as well as daughter in [place] and the son-in-law, explained EGD findings as well as risk of rebleeding. Explained to the family that TIPS may decrease the portal hypertension and the risk of rebleeding however TIPS may be technically challenging - if there is no window of shunting- given the presence of mass / cholangiocarcinoma in the right hepatic lobe. patient's family would like to proceed with full evaluation and management. At this time we refer patient to tertiary center / [facility] as per family wishes. continue octreotide drip, serial H&H, antibiotic regimen for SBP prophylaxis. May need a repeat EGD pending clinical course. No immediate indication for TIPS at this time. Continue critical care management.   Arpita Dooley MD

## 2020-10-13 NOTE — ED TRIAGE NOTES
Patient presents with complaints of bloody emesis that started about a half hour before calling for an ambulance. Patient has a hx of liver CA and ascites.  Patient had about 100ml bloody emesis on arrival.

## 2020-10-13 NOTE — PLAN OF CARE
Problem: Pain:  Goal: Pain level will decrease  Outcome: Ongoing  Goal: Control of acute pain  Outcome: Ongoing  Goal: Control of chronic pain  Outcome: Ongoing     Problem: Falls - Risk of:  Goal: Will remain free from falls  Outcome: Ongoing  Goal: Absence of physical injury  Outcome: Ongoing     Problem: HEMODYNAMIC STATUS  Goal: Hemodynamic status to baseline/discharge criteria met  Outcome: Ongoing     Problem: SAFETY & PSYCHO SOCIAL  Goal: Patient returns to baseline activity/meets discharge criteria  Outcome: Ongoing     Problem: NUTRITION  Goal: Patient to baseline / improved nutrition  Outcome: Ongoing     Problem: LAB & DIAGNOSTICS  Goal: Additional tests per physician orders  Outcome: Ongoing     Problem: LAB & DIAGNOSTICS  Goal: Additional tests per physician orders  Outcome: Ongoing     Problem: RESPIRATORY  Goal: SaO2 sat,airway patentcy, cough mechanism maintained  Outcome: Ongoing     Problem: KNOWLEDGE DEFICIT,EDUCATION,DISCHARGE PLAN  Goal: Patient/S. O.verbalize understanding of procedure/DC instruct  Outcome: Ongoing     Problem: PAIN MANAGEMENT  Goal: Patient return to pre procedure comfort  Outcome: Ongoing     Problem: PAIN MANAGEMENT  Goal: Patient return to pre procedure comfort  Outcome: Ongoing     Problem: Activity:  Goal: Fatigue will decrease  Outcome: Ongoing  Goal: Ability to tolerate increased activity will improve  Outcome: Ongoing  Goal: Ability to maintain optimal joint mobility will improve  Outcome: Ongoing     Problem:  Bowel/Gastric:  Goal: Ability to achieve a regular elimination pattern will improve  Outcome: Ongoing     Problem: Cardiac:  Goal: Ability to maintain an adequate cardiac output will improve  Outcome: Ongoing  Goal: Ability to maintain adequate ventilation will improve  Outcome: Ongoing  Goal: Ability to achieve and maintain adequate cardiopulmonary perfusion will improve  Outcome: Ongoing     Problem: Coping:  Goal: Ability to adjust to condition or change in health will improve  Outcome: Ongoing  Goal: Communication of feelings regarding changes in body function or appearance will improve  Outcome: Ongoing     Problem: Health Behavior:  Goal: Identification of resources available to assist in meeting health care needs will improve  Outcome: Ongoing     Problem: Nutritional:  Goal: Maintenance of adequate nutrition will improve  Outcome: Ongoing     Problem: Physical Regulation:  Goal: Signs and symptoms of infection will decrease  Outcome: Ongoing  Goal: Will show no signs and symptoms of excessive bleeding  Outcome: Ongoing  Goal: Complications related to the disease process, condition or treatment will be avoided or minimized  Outcome: Ongoing     Problem: Safety:  Goal: Ability to remain free from injury will improve  Outcome: Ongoing     Problem: Sensory:  Goal: Pain level will decrease  Outcome: Ongoing  Goal: General experience of comfort will improve  Outcome: Ongoing     Problem: Skin Integrity:  Goal: Skin integrity will improve  Outcome: Ongoing  Goal: Signs of wound healing will improve  Outcome: Ongoing     Problem: Tissue Perfusion:  Goal: Ability to maintain adequate tissue perfusion will improve  Outcome: Ongoing  Goal: Ability to maintain a stable neurologic state will improve  Outcome: Ongoing

## 2020-10-13 NOTE — ANESTHESIA PRE PROCEDURE
Department of Anesthesiology  Preprocedure Note       Name:  Emelia Ontiveros   Age:  70 y.o.  :  1945                                          MRN:  [id number]         Date:  10/13/2020      Surgeon: sEther Burks):  David Castellon MD    Procedure: Procedure(s):  EGD ESOPHAGOGASTRODUODENOSCOPY    Medications prior to admission:   Prior to Admission medications    Medication Sig Start Date End Date Taking?  Authorizing Provider   insulin lispro (HUMALOG) 100 UNIT/ML injection vial Inject 10 Units into the skin 3 times daily (before meals) 10/10/20  Yes Andres Wells MD   hydrALAZINE (APRESOLINE) 50 MG tablet Take 1 tablet by mouth every 8 hours 10/9/20  Yes Andres Wells MD   insulin glargine (LANTUS) 100 UNIT/ML injection vial Inject 20 Units into the skin daily (before lunch) 10/9/20  Yes Andres Wells MD   albuterol sulfate HFA (PROVENTIL HFA) 108 (90 Base) MCG/ACT inhaler Inhale 2 puffs into the lungs every 6 hours as needed for Wheezing 10/9/20  Yes Andres Wells MD   amLODIPine (NORVASC) 10 MG tablet Take 10 mg by mouth daily   Yes Historical Provider, MD   aspirin 81 MG tablet Take 81 mg by mouth daily   Yes Historical Provider, MD   lactulose (CHRONULAC) 10 GM/15ML solution Take 30 mLs by mouth 2 times daily 10/9/20   Andres Wells MD   torsemide (DEMADEX) 20 MG tablet Take 2 tablets by mouth daily 10/10/20   Andres Wells MD   cefUROXime (CEFTIN) 250 MG tablet Take 1 tablet by mouth 2 times daily for 7 days 10/9/20 10/16/20  Andres Wells MD       Current medications:    Current Facility-Administered Medications   Medication Dose Route Frequency Provider Last Rate Last Dose    sodium chloride flush 0.9 % injection 10 mL  10 mL Intravenous 2 times per day DARY Jeffries CNP   10 mL at 10/13/20 0841    sodium chloride flush 0.9 % injection 10 mL  10 mL Intravenous PRN DARY Jeffries CNP        acetaminophen (TYLENOL) tablet 650 mg  650 mg Oral Q6H PRN Jake Living ESTER JaureguiN - CNP        Or    acetaminophen (TYLENOL) suppository 650 mg  650 mg Rectal Q6H PRN Choe Homosassa, APRN - CNP        promethazine (PHENERGAN) tablet 12.5 mg  12.5 mg Oral Q6H PRN Choe Homosassa, APRN - CNP        Or    ondansetron [facility]) injection 4 mg  4 mg Intravenous Q6H PRN Choe Homosassa, APRN - CNP        pantoprazole (PROTONIX) injection 40 mg  40 mg Intravenous Q12H Choe Homosassa, APRN - CNP   40 mg at 10/13/20 0840    And    sodium chloride (PF) 0.9 % injection 10 mL  10 mL Intravenous Q12H Choe Homosassa, APRN - CNP   10 mL at 10/13/20 0841    glucose (GLUTOSE) 40 % oral gel 15 g  15 g Oral PRN Choe Homosassa, APRN - CNP        dextrose 50 % IV solution  12.5 g Intravenous PRN Choe Homosassa, APRN - CNP        glucagon (rDNA) injection 1 mg  1 mg Intramuscular PRN Choe Homosassa, APRN - CNP        dextrose 5 % solution  100 mL/hr Intravenous PRN Choe Homosassa, APRN - CNP        insulin lispro (HUMALOG) injection vial 0-12 Units  0-12 Units Subcutaneous TID WC Zoe Jauregui APRN - CNP        insulin lispro (HUMALOG) injection vial 0-6 Units  0-6 Units Subcutaneous Nightly Zoe Jauregui APRN - CNP        octreotide (SANDOSTATIN) 500 mcg in sodium chloride 0.9 % 100 mL infusion  25 mcg/hr Intravenous Continuous Doc MD Kenney 5 mL/hr at 10/13/20 0840 25 mcg/hr at 10/13/20 0840       Allergies:  No Known Allergies    Problem List:    Patient Active Problem List   Diagnosis Code    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 Brandon Adams. Social History:    Social History     Tobacco Use    Smoking status: Never Smoker    Smokeless tobacco: Never Used   Substance Use Topics    Alcohol use: No                                Counseling given: Not Answered      Vital Signs (Current):   Vitals:    10/13/20 0530 10/13/20 0630 10/13/20 0700 10/13/20 0731   BP: (!) 111/57  (!) 105/44    Pulse:  102 101 102   Resp:  12 12    Temp:       TempSrc:       SpO2: 98% 100% 100%    Weight:       Height:                                                  BP Readings from Last 3 Encounters:   10/13/20 (!) 105/44   10/12/20 (!) 146/76   10/12/20 (!) 147/66       NPO Status:                                                                                 BMI:   Wt Readings from Last 3 Encounters:   10/13/20 128 lb (58.1 kg)   10/12/20 128 lb (58.1 kg)   10/04/20 143 lb (64.9 kg)     Body mass index is 22.67 kg/m².     CBC:   Lab Results   Component Value Date    WBC 11.8 10/13/2020    RBC 2.61 10/13/2020    HGB 6.4 10/13/2020    HCT 19.7 10/13/2020    MCV 68.9 10/13/2020    RDW 22.3 10/13/2020     10/13/2020       CMP:   Lab Results   Component Value Date     10/13/2020    K 4.5 10/13/2020    K 4.0 10/09/2020    CL 94 10/13/2020    CO2 25 10/13/2020    BUN 78 10/13/2020    CREATININE 2.26 10/13/2020    GFRAA 25.6 10/13/2020    LABGLOM 21.2 10/13/2020    GLUCOSE 110 10/13/2020    GLUCOSE 576 07/13/2020    PROT 4.7 10/13/2020    CALCIUM 6.9 10/13/2020    BILITOT 0.6 10/13/2020    ALKPHOS 324 10/13/2020    AST 63 10/13/2020    ALT 30 10/13/2020       POC Tests:   Recent Labs     10/13/20  0846   POCGLU 161*       Coags:   Lab Results   Component Value Date    PROTIME 16.2 10/13/2020    INR 1.3 10/13/2020    APTT 33.6 10/13/2020       HCG (If Applicable): No results found for: PREGTESTUR, PREGSERUM, HCG, HCGQUANT     ABGs: No results found for: PHART, PO2ART, ZWE8URZ, PRQ6TVO, BEART, P8OLJNSN     Type & Screen (If Applicable):  No results found for: LABABO, LABRH    Drug/Infectious Status (If Applicable):  No results found for: HIV, HEPCAB    COVID-19 Screening (If Applicable):   Lab Results   Component Value Date    COVID19 Not Detected 10/06/2020         Anesthesia Evaluation    Airway: Mallampati: II  TM distance: >3 FB   Neck ROM: full  Mouth opening: > = 3 FB Dental:          Pulmonary:normal exam    (+) pneumonia:                             Cardiovascular:    (+) hypertension:,         Rhythm: regular  Rate: normal                    Neuro/Psych:   (+) depression/anxiety             GI/Hepatic/Renal:   (+) renal disease: CRI,           Endo/Other:    (+) Diabetes, blood dyscrasia: anemia:., .                 Abdominal:           Vascular: negative vascular ROS. Anesthesia Plan      MAC     ASA 3 - emergent       Induction: intravenous. Anesthetic plan and risks discussed with patient. Plan discussed with attending.                 DARY Guadarrama - CRNA   10/13/2020

## 2020-10-14 VITALS
HEART RATE: 86 BPM | WEIGHT: 128 LBS | RESPIRATION RATE: 16 BRPM | TEMPERATURE: 98.6 F | HEIGHT: 63 IN | SYSTOLIC BLOOD PRESSURE: 145 MMHG | DIASTOLIC BLOOD PRESSURE: 57 MMHG | OXYGEN SATURATION: 100 % | BODY MASS INDEX: 22.68 KG/M2

## 2020-10-14 LAB
GLUCOSE BLD-MCNC: 128 MG/DL (ref 60–115)
HCT VFR BLD CALC: 20.2 % (ref 37–47)
HEMOGLOBIN: 6.6 G/DL (ref 12–16)
PERFORMED ON: ABNORMAL

## 2020-10-14 PROCEDURE — 2580000003 HC RX 258: Performed by: INTERNAL MEDICINE

## 2020-10-14 PROCEDURE — 36415 COLL VENOUS BLD VENIPUNCTURE: CPT

## 2020-10-14 PROCEDURE — 85018 HEMOGLOBIN: CPT

## 2020-10-14 PROCEDURE — 85014 HEMATOCRIT: CPT

## 2020-10-14 PROCEDURE — 6360000002 HC RX W HCPCS: Performed by: INTERNAL MEDICINE

## 2020-10-14 RX ADMIN — OCTREOTIDE ACETATE 25 MCG/HR: 500 INJECTION, SOLUTION INTRAVENOUS; SUBCUTANEOUS at 03:34

## 2020-10-14 ASSESSMENT — PULMONARY FUNCTION TESTS
PIF_VALUE: 26
PIF_VALUE: 26

## 2020-10-14 ASSESSMENT — PAIN SCALES - GENERAL
PAINLEVEL_OUTOF10: 0
PAINLEVEL_OUTOF10: 0

## 2020-10-14 NOTE — FLOWSHEET NOTE
2030:  Assessment initiated. See flowsheets. 2130:  Daughters updated on condition. 2300:  Resting easily. Will continue to lower sedation as tolerated. 0130:  [facility] contacted by this nurse for further transfer instructions. 5:  Daughters Deandre and Thelma notified of transfer this morning to [facility].  0200:  Report to [employer]. 0345:  Transferred to [facility] via [employer]. Upper dentures and  Personal blanket with patient.

## 2020-10-14 NOTE — CONSULTS
1/6 systolic murmur. ABDOMEN:  Distended. Bowel sounds are decreased. EXTREMITIES:  Show muscle wasting. No edema. IMPRESSION:  IAIN on CKD III secondary to hypotension; GI bleeding, GI  bleeding secondary to varices; recent cholangiocarcinoma; diabetes  mellitus type 2; hypertension; hyponatremia; hypoalbuminemia; anemia,  last hemoglobin 6.4 gm. PLAN:  Continue to follow hemoglobin and hematocrit levels for further  need of transfusions, watch platelet count, and albumin as needed to  help blood pressure in addition to pressor agents if as needed. Follow  I and Os and BMPs for need of possible dialysis support with recent  respiratory event in endoscopy lab. Thank you very much.         Wang Chadwick DO    D: 10/13/2020 15:27:13       T: 10/13/2020 15:34:18     [email or web address]  Job#: [id number]     Doc#: [id number]    CC:

## 2020-10-23 LAB
ALBUMIN SERPL-MCNC: 1.9 G/DL (ref 3.5–4.6)
ALP BLD-CCNC: 492 U/L (ref 40–130)
ALT SERPL-CCNC: 37 U/L (ref 0–33)
ANISOCYTOSIS: ABNORMAL
AST SERPL-CCNC: 77 U/L (ref 0–35)
BASOPHILS ABSOLUTE: 0 K/UL (ref 0–0.2)
BASOPHILS RELATIVE PERCENT: 1.5 %
BILIRUB SERPL-MCNC: 0.6 MG/DL (ref 0.2–0.7)
BILIRUBIN DIRECT: 0.4 MG/DL (ref 0–0.4)
BILIRUBIN, INDIRECT: 0.2 MG/DL (ref 0–0.6)
EOSINOPHILS ABSOLUTE: 0 K/UL (ref 0–0.7)
EOSINOPHILS RELATIVE PERCENT: 1.2 %
HCT VFR BLD CALC: 22.3 % (ref 37–47)
HEMOGLOBIN: 7.2 G/DL (ref 12–16)
HYPOCHROMIA: ABNORMAL
INR BLD: 1.2
LYMPHOCYTES ABSOLUTE: 0.7 K/UL (ref 1–4.8)
LYMPHOCYTES RELATIVE PERCENT: 11 %
MACROCYTES: ABNORMAL
MCH RBC QN AUTO: 25.5 PG (ref 27–31.3)
MCHC RBC AUTO-ENTMCNC: 32.1 % (ref 33–37)
MCV RBC AUTO: 79.5 FL (ref 82–100)
MICROCYTES: ABNORMAL
MONOCYTES ABSOLUTE: 0.4 K/UL (ref 0.2–0.8)
MONOCYTES RELATIVE PERCENT: 6.7 %
NEUTROPHILS ABSOLUTE: 5 K/UL (ref 1.4–6.5)
NEUTROPHILS RELATIVE PERCENT: 83 %
PDW BLD-RTO: 22.4 % (ref 11.5–14.5)
PLATELET # BLD: 141 K/UL (ref 130–400)
PLATELET SLIDE REVIEW: NORMAL
POIKILOCYTES: ABNORMAL
PROTHROMBIN TIME: 14.9 SEC (ref 12.3–14.9)
RBC # BLD: 2.81 M/UL (ref 4.2–5.4)
TARGET CELLS: ABNORMAL
TOTAL PROTEIN: 6.1 G/DL (ref 6.3–8)
WBC # BLD: 6 K/UL (ref 4.8–10.8)

## 2020-11-02 ENCOUNTER — HOSPITAL ENCOUNTER (OUTPATIENT)
Dept: INFUSION THERAPY | Age: 75
Setting detail: INFUSION SERIES
Discharge: HOME | End: 2020-11-02
Payer: MEDICARE

## 2020-11-02 VITALS
DIASTOLIC BLOOD PRESSURE: 64 MMHG | RESPIRATION RATE: 18 BRPM | HEART RATE: 84 BPM | SYSTOLIC BLOOD PRESSURE: 139 MMHG | TEMPERATURE: 97.8 F

## 2020-11-02 DIAGNOSIS — D63.1 ERYTHROPOIETIN DEFICIENCY ANEMIA: ICD-10-CM

## 2020-11-02 DIAGNOSIS — N18.30 STAGE 3 CHRONIC KIDNEY DISEASE, UNSPECIFIED WHETHER STAGE 3A OR 3B CKD (HCC): Primary | ICD-10-CM

## 2020-11-02 PROCEDURE — 96372 THER/PROPH/DIAG INJ SC/IM: CPT

## 2020-11-02 PROCEDURE — 6360000002 HC RX W HCPCS: Performed by: INTERNAL MEDICINE

## 2020-11-02 RX ADMIN — EPOETIN ALFA-EPBX 10000 UNITS: 10000 INJECTION, SOLUTION INTRAVENOUS; SUBCUTANEOUS at 14:49

## 2020-11-03 ENCOUNTER — HOSPITAL ENCOUNTER (INPATIENT)
Age: 75
LOS: 6 days | Discharge: HOME HEALTH | DRG: 432 | End: 2020-11-09
Attending: STUDENT IN AN ORGANIZED HEALTH CARE EDUCATION/TRAINING PROGRAM | Admitting: INTERNAL MEDICINE
Payer: MEDICARE

## 2020-11-03 ENCOUNTER — TELEPHONE (OUTPATIENT)
Dept: OTHER | Facility: CLINIC | Age: 75
End: 2020-11-03

## 2020-11-03 PROBLEM — K92.2 GIB (GASTROINTESTINAL BLEEDING): Status: ACTIVE | Noted: 2020-11-03

## 2020-11-03 LAB
ABO/RH: NORMAL
ALBUMIN SERPL-MCNC: 1.9 G/DL (ref 3.5–4.6)
ALP BLD-CCNC: 472 U/L (ref 40–130)
ALT SERPL-CCNC: 44 U/L (ref 0–33)
ANION GAP SERPL CALCULATED.3IONS-SCNC: 8 MEQ/L (ref 9–15)
ANISOCYTOSIS: ABNORMAL
ANTIBODY SCREEN: NORMAL
APTT: 32.8 SEC (ref 24.4–36.8)
AST SERPL-CCNC: 96 U/L (ref 0–35)
BASOPHILS ABSOLUTE: 0.2 K/UL (ref 0–0.2)
BASOPHILS RELATIVE PERCENT: 2 %
BILIRUB SERPL-MCNC: 0.8 MG/DL (ref 0.2–0.7)
BLOOD BANK DISPENSE STATUS: NORMAL
BLOOD BANK DISPENSE STATUS: NORMAL
BLOOD BANK PRODUCT CODE: NORMAL
BLOOD BANK PRODUCT CODE: NORMAL
BPU ID: NORMAL
BPU ID: NORMAL
BUN BLDV-MCNC: 63 MG/DL (ref 8–23)
CALCIUM SERPL-MCNC: 7.4 MG/DL (ref 8.5–9.9)
CHLORIDE BLD-SCNC: 107 MEQ/L (ref 95–107)
CO2: 26 MEQ/L (ref 20–31)
CREAT SERPL-MCNC: 3.38 MG/DL (ref 0.5–0.9)
DESCRIPTION BLOOD BANK: NORMAL
DESCRIPTION BLOOD BANK: NORMAL
EOSINOPHILS ABSOLUTE: 0 K/UL (ref 0–0.7)
EOSINOPHILS RELATIVE PERCENT: 0.5 %
GFR AFRICAN AMERICAN: 16.1
GFR NON-AFRICAN AMERICAN: 13.3
GLOBULIN: 4 G/DL (ref 2.3–3.5)
GLUCOSE BLD-MCNC: 23 MG/DL (ref 60–115)
GLUCOSE BLD-MCNC: 44 MG/DL (ref 60–115)
GLUCOSE BLD-MCNC: 59 MG/DL (ref 60–115)
GLUCOSE BLD-MCNC: 62 MG/DL (ref 70–99)
GLUCOSE BLD-MCNC: 65 MG/DL (ref 60–115)
GLUCOSE BLD-MCNC: 73 MG/DL (ref 60–115)
GLUCOSE BLD-MCNC: 83 MG/DL (ref 60–115)
GLUCOSE BLD-MCNC: 95 MG/DL (ref 60–115)
HCT VFR BLD CALC: 15.1 % (ref 37–47)
HCT VFR BLD CALC: 21.6 % (ref 37–47)
HEMOGLOBIN: 4.8 G/DL (ref 12–16)
HEMOGLOBIN: 6.7 G/DL (ref 12–16)
HYPOCHROMIA: ABNORMAL
INR BLD: 1.2
LIPASE: 64 U/L (ref 12–95)
LYMPHOCYTES ABSOLUTE: 1.2 K/UL (ref 1–4.8)
LYMPHOCYTES RELATIVE PERCENT: 14 %
MCH RBC QN AUTO: 24.9 PG (ref 27–31.3)
MCHC RBC AUTO-ENTMCNC: 32 % (ref 33–37)
MCV RBC AUTO: 77.7 FL (ref 82–100)
MICROCYTES: ABNORMAL
MONOCYTES ABSOLUTE: 0.4 K/UL (ref 0.2–0.8)
MONOCYTES RELATIVE PERCENT: 4.8 %
NEUTROPHILS ABSOLUTE: 6.7 K/UL (ref 1.4–6.5)
NEUTROPHILS RELATIVE PERCENT: 79 %
PDW BLD-RTO: 21.2 % (ref 11.5–14.5)
PERFORMED ON: ABNORMAL
PERFORMED ON: NORMAL
PLATELET # BLD: 204 K/UL (ref 130–400)
PLATELET SLIDE REVIEW: NORMAL
POIKILOCYTES: ABNORMAL
POTASSIUM SERPL-SCNC: 5.3 MEQ/L (ref 3.4–4.9)
PROTHROMBIN TIME: 14.9 SEC (ref 12.3–14.9)
RBC # BLD: 1.94 M/UL (ref 4.2–5.4)
SODIUM BLD-SCNC: 141 MEQ/L (ref 135–144)
TARGET CELLS: ABNORMAL
TOTAL PROTEIN: 5.9 G/DL (ref 6.3–8)
WBC # BLD: 8.5 K/UL (ref 4.8–10.8)

## 2020-11-03 PROCEDURE — 87086 URINE CULTURE/COLONY COUNT: CPT

## 2020-11-03 PROCEDURE — 36430 TRANSFUSION BLD/BLD COMPNT: CPT

## 2020-11-03 PROCEDURE — 6360000002 HC RX W HCPCS: Performed by: STUDENT IN AN ORGANIZED HEALTH CARE EDUCATION/TRAINING PROGRAM

## 2020-11-03 PROCEDURE — 86923 COMPATIBILITY TEST ELECTRIC: CPT

## 2020-11-03 PROCEDURE — 6360000002 HC RX W HCPCS: Performed by: NURSE PRACTITIONER

## 2020-11-03 PROCEDURE — 2580000003 HC RX 258: Performed by: INTERNAL MEDICINE

## 2020-11-03 PROCEDURE — 86901 BLOOD TYPING SEROLOGIC RH(D): CPT

## 2020-11-03 PROCEDURE — 86850 RBC ANTIBODY SCREEN: CPT

## 2020-11-03 PROCEDURE — 2580000003 HC RX 258: Performed by: NURSE PRACTITIONER

## 2020-11-03 PROCEDURE — 6360000002 HC RX W HCPCS: Performed by: INTERNAL MEDICINE

## 2020-11-03 PROCEDURE — 83690 ASSAY OF LIPASE: CPT

## 2020-11-03 PROCEDURE — 80053 COMPREHEN METABOLIC PANEL: CPT

## 2020-11-03 PROCEDURE — 85014 HEMATOCRIT: CPT

## 2020-11-03 PROCEDURE — 99285 EMERGENCY DEPT VISIT HI MDM: CPT

## 2020-11-03 PROCEDURE — 85610 PROTHROMBIN TIME: CPT

## 2020-11-03 PROCEDURE — P9016 RBC LEUKOCYTES REDUCED: HCPCS

## 2020-11-03 PROCEDURE — 2580000003 HC RX 258: Performed by: STUDENT IN AN ORGANIZED HEALTH CARE EDUCATION/TRAINING PROGRAM

## 2020-11-03 PROCEDURE — 85018 HEMOGLOBIN: CPT

## 2020-11-03 PROCEDURE — C9113 INJ PANTOPRAZOLE SODIUM, VIA: HCPCS | Performed by: INTERNAL MEDICINE

## 2020-11-03 PROCEDURE — APPNB60 APP NON BILLABLE TIME 46-60 MINS: Performed by: NURSE PRACTITIONER

## 2020-11-03 PROCEDURE — 36415 COLL VENOUS BLD VENIPUNCTURE: CPT

## 2020-11-03 PROCEDURE — 2000000000 HC ICU R&B

## 2020-11-03 PROCEDURE — 85730 THROMBOPLASTIN TIME PARTIAL: CPT

## 2020-11-03 PROCEDURE — C9113 INJ PANTOPRAZOLE SODIUM, VIA: HCPCS | Performed by: STUDENT IN AN ORGANIZED HEALTH CARE EDUCATION/TRAINING PROGRAM

## 2020-11-03 PROCEDURE — 99222 1ST HOSP IP/OBS MODERATE 55: CPT | Performed by: INTERNAL MEDICINE

## 2020-11-03 PROCEDURE — 86900 BLOOD TYPING SEROLOGIC ABO: CPT

## 2020-11-03 PROCEDURE — 85025 COMPLETE CBC W/AUTO DIFF WBC: CPT

## 2020-11-03 PROCEDURE — 96365 THER/PROPH/DIAG IV INF INIT: CPT

## 2020-11-03 RX ORDER — 0.9 % SODIUM CHLORIDE 0.9 %
20 INTRAVENOUS SOLUTION INTRAVENOUS ONCE
Status: COMPLETED | OUTPATIENT
Start: 2020-11-03 | End: 2020-11-03

## 2020-11-03 RX ORDER — ALBUTEROL SULFATE 2.5 MG/3ML
2.5 SOLUTION RESPIRATORY (INHALATION) EVERY 4 HOURS PRN
Status: DISCONTINUED | OUTPATIENT
Start: 2020-11-03 | End: 2020-11-09 | Stop reason: HOSPADM

## 2020-11-03 RX ORDER — SODIUM CHLORIDE 0.9 % (FLUSH) 0.9 %
10 SYRINGE (ML) INJECTION EVERY 12 HOURS SCHEDULED
Status: DISCONTINUED | OUTPATIENT
Start: 2020-11-03 | End: 2020-11-09 | Stop reason: HOSPADM

## 2020-11-03 RX ORDER — SODIUM CHLORIDE 0.9 % (FLUSH) 0.9 %
10 SYRINGE (ML) INJECTION PRN
Status: DISCONTINUED | OUTPATIENT
Start: 2020-11-03 | End: 2020-11-09 | Stop reason: HOSPADM

## 2020-11-03 RX ORDER — ASPIRIN 81 MG/1
81 TABLET ORAL DAILY
Status: ON HOLD | COMMUNITY
End: 2020-11-09 | Stop reason: HOSPADM

## 2020-11-03 RX ORDER — ACETAMINOPHEN 325 MG/1
650 TABLET ORAL EVERY 6 HOURS PRN
Status: DISCONTINUED | OUTPATIENT
Start: 2020-11-03 | End: 2020-11-09 | Stop reason: HOSPADM

## 2020-11-03 RX ORDER — DEXTROSE MONOHYDRATE 25 G/50ML
12.5 INJECTION, SOLUTION INTRAVENOUS PRN
Status: DISCONTINUED | OUTPATIENT
Start: 2020-11-03 | End: 2020-11-07 | Stop reason: SDUPTHER

## 2020-11-03 RX ORDER — 0.9 % SODIUM CHLORIDE 0.9 %
20 INTRAVENOUS SOLUTION INTRAVENOUS ONCE
Status: DISCONTINUED | OUTPATIENT
Start: 2020-11-03 | End: 2020-11-09 | Stop reason: HOSPADM

## 2020-11-03 RX ORDER — PROMETHAZINE HYDROCHLORIDE 12.5 MG/1
12.5 TABLET ORAL EVERY 6 HOURS PRN
Status: DISCONTINUED | OUTPATIENT
Start: 2020-11-03 | End: 2020-11-09 | Stop reason: HOSPADM

## 2020-11-03 RX ORDER — NICOTINE POLACRILEX 4 MG
15 LOZENGE BUCCAL PRN
Status: DISCONTINUED | OUTPATIENT
Start: 2020-11-03 | End: 2020-11-07 | Stop reason: SDUPTHER

## 2020-11-03 RX ORDER — ACETAMINOPHEN 650 MG/1
650 SUPPOSITORY RECTAL EVERY 6 HOURS PRN
Status: DISCONTINUED | OUTPATIENT
Start: 2020-11-03 | End: 2020-11-09 | Stop reason: HOSPADM

## 2020-11-03 RX ORDER — ONDANSETRON 2 MG/ML
4 INJECTION INTRAMUSCULAR; INTRAVENOUS EVERY 6 HOURS PRN
Status: DISCONTINUED | OUTPATIENT
Start: 2020-11-03 | End: 2020-11-09 | Stop reason: HOSPADM

## 2020-11-03 RX ORDER — 0.9 % SODIUM CHLORIDE 0.9 %
500 INTRAVENOUS SOLUTION INTRAVENOUS ONCE
Status: COMPLETED | OUTPATIENT
Start: 2020-11-03 | End: 2020-11-03

## 2020-11-03 RX ORDER — CEFTRIAXONE 2 G/1
2 INJECTION, POWDER, FOR SOLUTION INTRAMUSCULAR; INTRAVENOUS ONCE
Status: DISCONTINUED | OUTPATIENT
Start: 2020-11-03 | End: 2020-11-03

## 2020-11-03 RX ORDER — DEXTROSE AND SODIUM CHLORIDE 5; .45 G/100ML; G/100ML
INJECTION, SOLUTION INTRAVENOUS CONTINUOUS
Status: DISCONTINUED | OUTPATIENT
Start: 2020-11-03 | End: 2020-11-06

## 2020-11-03 RX ORDER — DEXTROSE MONOHYDRATE 50 MG/ML
100 INJECTION, SOLUTION INTRAVENOUS PRN
Status: DISCONTINUED | OUTPATIENT
Start: 2020-11-03 | End: 2020-11-07 | Stop reason: SDUPTHER

## 2020-11-03 RX ADMIN — SODIUM CHLORIDE 80 MG: 9 INJECTION, SOLUTION INTRAVENOUS at 14:10

## 2020-11-03 RX ADMIN — SODIUM CHLORIDE 500 ML: 9 INJECTION, SOLUTION INTRAVENOUS at 11:04

## 2020-11-03 RX ADMIN — DEXTROSE MONOHYDRATE 12.5 G: 25 INJECTION, SOLUTION INTRAVENOUS at 18:17

## 2020-11-03 RX ADMIN — SODIUM CHLORIDE 20 ML: 9 INJECTION, SOLUTION INTRAVENOUS at 20:27

## 2020-11-03 RX ADMIN — DEXTROSE MONOHYDRATE 100 ML/HR: 50 INJECTION, SOLUTION INTRAVENOUS at 17:46

## 2020-11-03 RX ADMIN — SODIUM CHLORIDE 8 MG/HR: 9 INJECTION, SOLUTION INTRAVENOUS at 15:07

## 2020-11-03 RX ADMIN — DEXTROSE AND SODIUM CHLORIDE: 5; 450 INJECTION, SOLUTION INTRAVENOUS at 17:50

## 2020-11-03 RX ADMIN — DEXTROSE MONOHYDRATE 12.5 G: 25 INJECTION, SOLUTION INTRAVENOUS at 16:37

## 2020-11-03 RX ADMIN — CEFTRIAXONE SODIUM 1 G: 1 INJECTION, POWDER, FOR SOLUTION INTRAMUSCULAR; INTRAVENOUS at 15:30

## 2020-11-03 RX ADMIN — Medication 10 ML: at 20:27

## 2020-11-03 RX ADMIN — OCTREOTIDE ACETATE 50 MCG/HR: 500 INJECTION, SOLUTION INTRAVENOUS; SUBCUTANEOUS at 15:22

## 2020-11-03 RX ADMIN — DEXTROSE MONOHYDRATE 12.5 G: 25 INJECTION, SOLUTION INTRAVENOUS at 16:18

## 2020-11-03 RX ADMIN — SODIUM CHLORIDE 80 MG: 9 INJECTION, SOLUTION INTRAVENOUS at 11:24

## 2020-11-03 ASSESSMENT — PAIN SCALES - GENERAL
PAINLEVEL_OUTOF10: 0

## 2020-11-03 ASSESSMENT — ENCOUNTER SYMPTOMS
SINUS PRESSURE: 0
TROUBLE SWALLOWING: 0
ABDOMINAL PAIN: 0
NAUSEA: 0
VOMITING: 0
COUGH: 0
ABDOMINAL DISTENTION: 0
RECTAL PAIN: 0
CHEST TIGHTNESS: 0
SHORTNESS OF BREATH: 0
DIARRHEA: 0
BLOOD IN STOOL: 1
BACK PAIN: 0

## 2020-11-03 NOTE — ED NOTES
Attempt x2 for second IV. Patient pulled arm. Unable to obtain second IV patient refusing anymore sticks.       Fabio Hobbs RN  11/03/20 9033

## 2020-11-03 NOTE — ED TRIAGE NOTES
Patient arrived from home via [facility] with  at bedside with complaint of GI bleed since last night. Per patient she has no abd pain, nausea, vomiting, sob, or chest pain. Patient does have hx of bleeds in the past with hx of anemia. No distress noted. Patient changed into gown upon arrival. Patient has night gown, coat, and blanket from home. Patient A&OX4.

## 2020-11-03 NOTE — PROGRESS NOTES
GASTRO CONSULT CALLED TO  [facility] VIA [facility] Electronically signed by Rubén Salmeron on 11/3/2020 at 2:25 PM    Kylie Steve  [facility] Electronically signed by Rubén Salmeron on 11/3/2020 at 6:12 PM

## 2020-11-03 NOTE — ED NOTES
Dr. Dylan Keating at bedside for rectal exam. Incontinence of stool noted. Red/dark stool noted. Patient cleaned up. Prolapsed uterus noted which patient stated she always has. Daughter at bedside. Patient cleaned up. Depends placed under patient.       Brandon Goff RN  11/03/20 1100

## 2020-11-03 NOTE — ED NOTES
Patient has eyes closed in bed but is awake.  at bedside. Updated on admission status. No distress noted. Belonging checklist completed.       Lesia Mcgrath RN  11/03/20 1240

## 2020-11-03 NOTE — ED PROVIDER NOTES
[facility] ED  eMERGENCY dEPARTMENT eNCOUnter      Pt Name: Freda Villasenor  MRN: [id number]  [place] 1945  Date of evaluation: 11/3/2020  Provider: Sohail Ayala [address]       Chief Complaint   Patient presents with    GI Problem     dark stools per patient since last night. hx of liver CA. starting chemo next month. HISTORY OF PRESENT ILLNESS   (Location/Symptom, Timing/Onset,Context/Setting, Quality, Duration, Modifying Factors, Severity)  Note limiting factors. Freda Vlilasenor is a 70 y.o. female who presents to the emergency department with complaint of black tarry stools which started last night. Patient denies any fever, chills or cough. Patient states she has a history of GI bleed and had a rubber band ligation done at [facility]. Patient denies vomiting or diarrhea. Patient states she gets iron infusions. Patient denies taking iron oral supplementation, black licorice, or Pepto-Bismol. Patient denies any abdominal pain. Patient denies any shortness of breath. Patient reports being more tired than usual and less physical activity. The history is provided by the patient and the spouse. NursingNotes were reviewed. REVIEW OF SYSTEMS    (2-9 systems for level 4, 10 or more for level 5)     Review of Systems   Constitutional: Positive for activity change and fatigue. Negative for appetite change, chills, fever and unexpected weight change. HENT: Negative for drooling, ear pain, nosebleeds, sinus pressure and trouble swallowing. Respiratory: Negative for cough, chest tightness and shortness of breath. Cardiovascular: Negative for chest pain and leg swelling. Gastrointestinal: Positive for blood in stool. Negative for abdominal distention, abdominal pain, diarrhea, nausea, rectal pain and vomiting. Melena   Endocrine: Negative for polydipsia and polyphagia. Genitourinary: Negative for dysuria, flank pain and frequency. Musculoskeletal: Negative for back pain and myalgias. Skin: Negative for pallor and rash. Neurological: Negative for syncope, weakness and headaches. Hematological: Does not bruise/bleed easily. All other systems reviewed and are negative. Except as noted above the remainder of the review of systems was reviewed and negative. PAST MEDICAL HISTORY     Past Medical History:   Diagnosis Date    Ascites     Cancer [facility])     liver    CKD (chronic kidney disease)     Depression     Diabetes mellitus ([facility] [address]     History of blood transfusion     Hydronephrosis     Hypertension     Pneumonia     Upper GI bleed          SURGICALHISTORY       Past Surgical History:   Procedure Laterality Date    PARACENTESIS Left 10/06/2020    3330 cc by Dr. Mau Lr.  UPPER GASTROINTESTINAL ENDOSCOPY N/A 10/13/2020    EGD ESOPHAGOGASTRODUODENOSCOPY WITH BANDING to esophageal varices sites performed by Ruben Manzano MD at [address] [place] [address]       Previous Medications    ALBUTEROL SULFATE HFA (PROVENTIL HFA) 108 (90 BASE) MCG/ACT INHALER    Inhale 2 puffs into the lungs every 6 hours as needed for Wheezing    AMLODIPINE (NORVASC) 10 MG TABLET    Take 10 mg by mouth daily    HYDRALAZINE (APRESOLINE) 50 MG TABLET    Take 1 tablet by mouth every 8 hours    INSULIN GLARGINE (LANTUS) 100 UNIT/ML INJECTION VIAL    Inject 20 Units into the skin daily (before lunch)    INSULIN LISPRO (HUMALOG) 100 UNIT/ML INJECTION VIAL    Inject 10 Units into the skin 3 times daily (before meals)    LACTULOSE (CHRONULAC) 10 GM/15ML SOLUTION    Take 30 mLs by mouth 2 times daily    TORSEMIDE (DEMADEX) 20 MG TABLET    Take 2 tablets by mouth daily       ALLERGIES     Patient has no known allergies.     FAMILY HISTORY       Family History   Problem Relation Age of Onset    Cancer Mother         stomach    Cancer Father         throat    Arthritis Neg Hx     Asthma Neg Hx     Birth Defects Neg Hx     Depression Neg Hx     Diabetes Neg Hx     Early Death Neg Hx     Hearing Loss Neg Hx     Heart Disease Neg Hx     High Blood Pressure Neg Hx     High Cholesterol Neg Hx     Kidney Disease Neg Hx     Learning Disabilities Neg Hx     Mental Illness Neg Hx     Mental Retardation Neg Hx     Miscarriages / Stillbirths Neg Hx     Stroke Neg Hx     Substance Abuse Neg Hx     Vision Loss Neg Hx     Other Neg Hx           SOCIAL HISTORY       Social History     Socioeconomic History    Marital status:      Spouse name: None    Number of children: None    Years of education: None    Highest education level: None   Occupational History    None   Social Needs    Financial resource strain: None    Food insecurity     Worry: None     Inability: None    Transportation needs     Medical: None     Non-medical: None   Tobacco Use    Smoking status: Never Smoker    Smokeless tobacco: Never Used   Substance and Sexual Activity    Alcohol use: No    Drug use: No    Sexual activity: Yes     Partners: Male   Lifestyle    Physical activity     Days per week: None     Minutes per session: None    Stress: None   Relationships    Social connections     Talks on phone: None     Gets together: None     Attends [employer] service: None     Active member of club or organization: None     Attends meetings of clubs or organizations: None     Relationship status: None    Intimate partner violence     Fear of current or ex partner: None     Emotionally abused: None     Physically abused: None     Forced sexual activity: None   Other Topics Concern    None   Social History Narrative    None       SCREENINGS      @FLOW([id number])@      PHYSICAL EXAM    (up to 7 for level 4, 8 or more for level 5)     ED Triage Vitals [11/03/20 1029]   BP Temp Temp Source Pulse Resp SpO2 Height Weight   (!) 119/50 99 °F (37.2 °C) Oral 79 20 100 % 5' 3\" (1.6 m) 140 lb (63.5 kg)       Physical Exam  Vitals signs and nursing note reviewed. Exam conducted with a chaperone present. Constitutional:       General: She is awake. She is not in acute distress. Appearance: Normal appearance. She is well-developed and normal weight. She is not ill-appearing, toxic-appearing or diaphoretic. Comments: No photophobia. No phonophobia. HENT:      Head: Normocephalic and atraumatic. No Patton's sign. Right Ear: External ear normal.      Left Ear: External ear normal.      Nose: Nose normal. No congestion or rhinorrhea. Mouth/Throat:      Mouth: Mucous membranes are moist.      Pharynx: Oropharynx is clear. No oropharyngeal exudate or posterior oropharyngeal erythema. Eyes:      General: No scleral icterus. Right eye: No foreign body or discharge. Left eye: No discharge. Extraocular Movements: Extraocular movements intact. Conjunctiva/sclera: Conjunctivae normal.      Left eye: No exudate. Pupils: Pupils are equal, round, and reactive to light. Neck:      Musculoskeletal: Normal range of motion and neck supple. No neck rigidity. Vascular: No JVD. Trachea: No tracheal deviation. Comments: No meningismus. Cardiovascular:      Rate and Rhythm: Normal rate and regular rhythm. Pulses: Normal pulses. Heart sounds: Normal heart sounds. Heart sounds not distant. No murmur. No friction rub. No gallop. Pulmonary:      Effort: Pulmonary effort is normal. No respiratory distress. Breath sounds: Normal breath sounds. No stridor. No wheezing, rhonchi or rales. Chest:      Chest wall: No tenderness. Abdominal:      General: Abdomen is flat. Bowel sounds are normal. There is no distension. Palpations: Abdomen is soft. There is no mass. Tenderness: There is no abdominal tenderness. There is no right CVA tenderness, left CVA tenderness, guarding or rebound. Hernia: No hernia is present. Genitourinary:     Exam position: Knee-chest position.       Pubic Area: No rash or pubic lice. Rectum: Guaiac result positive. Comments: Black tarry stool that was guaiac positive  Musculoskeletal: Normal range of motion. General: No swelling, tenderness, deformity or signs of injury. Lymphadenopathy:      Head:      Right side of head: No submental adenopathy. Left side of head: No submental adenopathy. Skin:     General: Skin is warm and dry. Capillary Refill: Capillary refill takes less than 2 seconds. Coloration: Skin is not jaundiced or pale. Findings: No bruising, erythema, lesion or rash. Neurological:      General: No focal deficit present. Mental Status: She is alert and oriented to person, place, and time. Mental status is at baseline. Cranial Nerves: No cranial nerve deficit. Sensory: No sensory deficit. Motor: No weakness. Coordination: Coordination normal.      Deep Tendon Reflexes: Reflexes are normal and symmetric. Psychiatric:         Mood and Affect: Mood normal.         Behavior: Behavior normal. Behavior is cooperative. Thought Content:  Thought content normal.         Judgment: Judgment normal.         DIAGNOSTIC RESULTS     EKG: All EKG's are interpreted by the Emergency Department Physician who either signs or Co-signsthis chart in the absence of a cardiologist.        RADIOLOGY:   Puma Sim such as CT, Ultrasound and MRI are read by the radiologist. Plain radiographic images are visualized and preliminarily interpreted by the emergency physician with the below findings:        Interpretation per the Radiologist below, if available at the time ofthis note:    No orders to display         ED BEDSIDE ULTRASOUND:   Performed by ED Physician - none    LABS:  Labs Reviewed   CBC WITH AUTO DIFFERENTIAL - Abnormal; Notable for the following components:       Result Value    RBC 1.94 (*)     Hemoglobin 4.8 (*)     Hematocrit 15.1 (*)     MCV 77.7 (*)     MCH 24.9 (*)     MCHC 32.0 (*)     RDW 21.2 (*)     Neutrophils Absolute 6.7 (*)     All other components within normal limits    Narrative:     CALL  [facility] tel. [phone],  HGB and HCT results called to and read back by Dr. Clayton Ortega, 11/03/2020 11:21,  by [address] - Abnormal; Notable for the following components:    Potassium 5.3 (*)     Anion Gap 8 (*)     Glucose 62 (*)     BUN 63 (*)     CREATININE 3.38 (*)     GFR Non- 13.3 (*)     GFR  16.1 (*)     Calcium 7.4 (*)     Total Protein 5.9 (*)     Alb 1.9 (*)     Total Bilirubin 0.8 (*)     Alkaline Phosphatase 472 (*)     ALT 44 (*)     AST 96 (*)     Globulin 4.0 (*)     All other components within normal limits   LIPASE   PROTIME-INR   APTT   URINE RT REFLEX TO CULTURE   TYPE AND SCREEN   PREPARE RBC (CROSSMATCH)   PREPARE RBC (CROSSMATCH)       All other labs were within normal range or not returned as of this dictation. EMERGENCY DEPARTMENT COURSE and DIFFERENTIAL DIAGNOSIS/MDM:   Vitals:    Vitals:    11/03/20 1130 11/03/20 1158 11/03/20 1207 11/03/20 1225   BP: (!) 113/59 (!) 115/52 (!) 129/51 (!) 120/51   Pulse: 79 79 79 78   Resp: 19 16 16 16   Temp:   99.7 °F (37.6 °C) 98.8 °F (37.1 °C)   TempSrc:   Oral Oral   SpO2: 97% 100%  100%   Weight:       Height:               MDM  ER physician spoke with Dr. Corine Prado who will follow the patient in consultation. IV Protonix bolus of 80 mg was ordered. 500 cc fluid bolus was ordered. Patient's vital signs are stable. ER physician spoke with Rylan Mccann, who accepted the patient. House supervisor called stating patient's hemoglobin is less than 5 will need to go to the ICU.  1 unit of blood initially has been ordered for transfusion over 2 hours. Dr. Dahlia Lemos reinformed that the patient should go to the ICU and she verbalized understanding of this. CRITICAL CARE TIME   Total Critical Care time was 33 minutes, excluding separately reportableprocedures.   There was a high probability of clinicallysignificant/life threatening deterioration in the patient's condition which required my urgent intervention. CONSULTS:  IP CONSULT TO GI  IP CONSULT TO HOSPITALIST  IP CONSULT TO GI    PROCEDURES:  Unless otherwise noted below, none     Procedures    FINAL IMPRESSION      1. Gastrointestinal hemorrhage with melena    2. Symptomatic anemia          DISPOSITION/PLAN   DISPOSITION Admitted 11/03/2020 12:12:12 PM      PATIENT REFERRED TO:  No follow-up provider specified.     DISCHARGE MEDICATIONS:  New Prescriptions    No medications on file          (Please note that portions of this note were completed with a voice recognition program.  Efforts were made to edit the dictations but occasionally words are mis-transcribed.)    Corinne Berry DO (electronically signed)  Attending Emergency Physician          Corinne Berry DO  11/03/20 9099

## 2020-11-03 NOTE — CONSULTS
Inpatient consult to GI  Consult performed by: Raul Vargas MD  Consult ordered by: Jeni Mansfield DO          Patient Name: Brionna Diamond Date: 11/3/2020 10:22 AM  MR #: [id number]  : 1945    Attending Physician: Harpreet Rodriguez DO  Reason for consult: GIB    History of Presenting Illness:      Reinaldo Carter is a 70 y.o. female on hospital day 0 with a history of hypertension, depression, cholangiocarcinoma. Past surgical history includes recent paracentesis and EGD with EVL. Family history is notable for mother with gastric CA. Social history patient denies nicotine, EtOH or illicit drug use history Obtained From:  patient, electronic medical record  GI consult for GI bleed-patient was admitted to the ICU with complaints of melanotic stool prior to arrival for the past 24 hours, hemoglobin on arrival was 4.8, baseline is around 7, she is known to our service from a recent hospitalization for GI bleeding, with previous history of esophageal variceal bleeding 3 weeks ago which required endoscopic hemostasis with EVL, during that hospitalization patient has an established relationship with  for oncology and was subsequently transferred to [facility] for further evaluation and treatment of her underlying cholangiocarcinoma. History:      Past Medical History:   Diagnosis Date    Ascites     Cancer ([address]     liver    CKD (chronic kidney disease)     Depression     Diabetes mellitus ([address]     History of blood transfusion     Hydronephrosis     Hypertension     Pneumonia     Upper GI bleed      Past Surgical History:   Procedure Laterality Date    PARACENTESIS Left 10/06/2020    3330 cc by Dr. Tavon Win.     UPPER GASTROINTESTINAL ENDOSCOPY N/A 10/13/2020    EGD ESOPHAGOGASTRODUODENOSCOPY WITH BANDING to esophageal varices sites performed by Raul Vargas MD at [facility]     Family History  Family History   Problem Relation Age of Onset    Cancer Mother         stomach    Cancer Father         throat    Arthritis Neg Hx     Asthma Neg Hx     Birth Defects Neg Hx     Depression Neg Hx     Diabetes Neg Hx     Early Death Neg Hx     Hearing Loss Neg Hx     Heart Disease Neg Hx     High Blood Pressure Neg Hx     High Cholesterol Neg Hx     Kidney Disease Neg Hx     Learning Disabilities Neg Hx     Mental Illness Neg Hx     Mental Retardation Neg Hx     Miscarriages / Stillbirths Neg Hx     Stroke Neg Hx     Substance Abuse Neg Hx     Vision Loss Neg Hx     Other Neg Hx      [] Unable to obtain due to ventilated and/ or neurologic status  Social History     Socioeconomic History    Marital status:      Spouse name: Not on file    Number of children: Not on file    Years of education: Not on file    Highest education level: Not on file   Occupational History    Not on file   Social Needs    Financial resource strain: Not on file    Food insecurity     Worry: Not on file     Inability: Not on file    Transportation needs     Medical: Not on file     Non-medical: Not on file   Tobacco Use    Smoking status: Never Smoker    Smokeless tobacco: Never Used   Substance and Sexual Activity    Alcohol use: No    Drug use: No    Sexual activity: Yes     Partners: Male   Lifestyle    Physical activity     Days per week: Not on file     Minutes per session: Not on file    Stress: Not on file   Relationships    Social connections     Talks on phone: Not on file     Gets together: Not on file     Attends Holiness service: Not on file     Active member of club or organization: Not on file     Attends meetings of clubs or organizations: Not on file     Relationship status: Not on file    Intimate partner violence     Fear of current or ex partner: Not on file     Emotionally abused: Not on file     Physically abused: Not on file     Forced sexual activity: Not on file   Other Topics Concern    Not on file   Social History Narrative    Not on file      [] Unable to obtain due to ventilated and/ or neurologic status    Home Medications:      Medications Prior to Admission: insulin lispro (HUMALOG) 100 UNIT/ML injection vial, Inject 10 Units into the skin 3 times daily (before meals)  hydrALAZINE (APRESOLINE) 50 MG tablet, Take 1 tablet by mouth every 8 hours  insulin glargine (LANTUS) 100 UNIT/ML injection vial, Inject 20 Units into the skin daily (before lunch)  albuterol sulfate HFA (PROVENTIL HFA) 108 (90 Base) MCG/ACT inhaler, Inhale 2 puffs into the lungs every 6 hours as needed for Wheezing  amLODIPine (NORVASC) 10 MG tablet, Take 10 mg by mouth daily  aspirin 81 MG EC tablet, Take 81 mg by mouth daily  lactulose (CHRONULAC) 10 GM/15ML solution, Take 30 mLs by mouth 2 times daily  torsemide (DEMADEX) 20 MG tablet, Take 2 tablets by mouth daily    Current Hospital Medications:   Scheduled Meds:   sodium chloride  20 mL Intravenous Once    sodium chloride flush  10 mL Intravenous 2 times per day    cefTRIAXone  2 g Intravenous Once     Continuous Infusions:   pantoprozole (PROTONIX) infusion      [START ON 11/4/2020] octreotide (SANDOSTATIN) infusion      octreotide (SANDOSTATIN) infusion       PRN Meds:.sodium chloride flush, acetaminophen **OR** acetaminophen, promethazine **OR** ondansetron   pantoprozole (PROTONIX) infusion      [START ON 11/4/2020] octreotide (SANDOSTATIN) infusion      octreotide (SANDOSTATIN) infusion        Allergies:   No Known Allergies   Review of Systems:       [x] CV, Resp, Neuro, , and all other systems reviewed and negative other than listed in HPI.         Objective Findings:     Vitals:   Vitals:    11/03/20 1335 11/03/20 1349 11/03/20 1352 11/03/20 1446   BP:    (!) 136/47   Pulse:  76 78 76   Resp:    12   Temp:    99.7 °F (37.6 °C)   TempSrc:    Oral   SpO2:    100%   Weight: 140 lb 3.4 oz (63.6 kg)      Height: 5' 3\" (1.6 m)           Physical Examination:  General: alert  HEENT: Normocephalic, no scleral icterus.   Neck: No dependent reticular and subsegmental atelectatic bilaterally, greater on right. Liver:  Normal in size, shape, and attenuation. Small in size and nodular in contour. Multiple ill-defined and rounded areas of decreased attenuation throughout the liver. Largest area affected is within the right hepatic lobe with low attenuation foci measuring up to 4 x 6 cm. Bile Ducts:  Normal in caliber. Gallbladder:  No stones or wall thickening. Pancreas:  Normal without masses, cysts, ductal dilatation or calcification. Spleen:  Normal in size without masses or calcifications. No splenules. Kidneys:  Normal in size. Moderate right, with mild to moderate left pelvocaliectasis. Renal vascular calcification identified bilaterally with possible superimposed bilateral renal calculi measuring up to 2 mm. 2 cm cyst lateral midpole left kidney. Adrenals:  Normal. Small bowel:  Normal in caliber. Not well visualized secondary to lack of opacification. Appendix:  Not visualized. Colon:  Normal in caliber. Peritoneum:  No free air. Free fluid is identified within the right and anterior perihepatic spaces, subphrenic and perisplenic spaces, mesentery, Morison's pouch and bilateral paracolic gutters, and continuing to extending caudally into the pelvis. Vessels: Aorta normal in course and caliber. Probable bilateral femoral-popliteal bypass grafts. Lymph nodes:  Retroperitoneal:  No enlarged retroperitoneal lymph nodes. Mesenteric:  No enlarged mesenteric lymph nodes. Pelvic: No enlarged pelvic lymph nodes. Ureters: Not well visualized. Bladder: No wall thickening. Reproductive organs: No pelvic masses. Abdominal Wall:  No hernia identified  Diffuse edematous change of the abdominal and pelvic sidewalls. Bones:  No bone lesions. Diffuse disc space narrowing, T6-7 through T11-12 disc spaces, greatest at T7-8 through T9-10 disc spaces. No post operative changes.      Multiple ill-defined and rounded low attenuation foci throughout the right and left hepatic lobes. Malignancy diagnosis of exclusion. Cirrhosis. Anasarca, with large volume ascites. Moderate right and mild to moderate left hydronephrosis with bilateral renal vascular calcification, and possible superimposed bilateral renal calculi measuring up to 2 mm. No etiology of obstruction identified on this noncontrast enhanced study. All CT scans at this facility use dose modulation, iterative reconstruction, and/or weight based dosing when appropriate to reduce radiation dose to as low as reasonably achievable. Ct Head Wo Contrast    Result Date: 10/5/2020  EXAMINATION: CT HEAD WO CONTRAST  DATE AND TIME:10/5/2020 7:36 AM CLINICAL HISTORY: Severe headache.  metabolic encephalopathy  COMPARISON: None TECHNIQUE:Axial CT from skull base to vertex without  contrast..  All CT scans at this facility use dose modulation, iterative reconstruction, and/or weight based dosing when appropriate to reduce radiation dose to as low as reasonably achievable. FINDINGS CSF spaces: Ventricles are normal in size and position. Sulci are appropriate for age. Brain parenchyma: No  parenchymal mass,  mass effect,  signs of acute infarct, or extra-axial fluid. There are mild patchy nonspecific white matter hypodensities that may be related to microvascular ischemic change or  normal aging. Hemorrhage:No acute intracranial hemorrhage. Skull base: The bony calvarium and skull base are normal.   The visualized paranasal sinuses and mastoids are clear. NO ACUTE INTRACRANIAL PATHOLOGY. Xr Chest Portable    Result Date: 10/13/2020  Exam: XR CHEST PORTABLE History:  tube placement Technique: AP portable view of the chest obtained. Comparison: None   Findings: There is an endotracheal tube with the tip 1 cm above the [place]. There is no pneumothorax. There are no infiltrates or consolidations or effusions. There is a slightly low inspiratory volume study. Bones of the thorax appear intact. Low inspiratory volume study. There is a endotracheal tube with tip projecting approximately 1 cm proximal to [place]. Xr Chest Portable    Result Date: 10/5/2020  EXAMINATION: XR CHEST PORTABLE CLINICAL HISTORY: COUGH, SHORTNESS OF BREATH, ALTERED MENTAL STATUS COMPARISONS: None available. FINDINGS: Osseous structures intact. Cardiopericardial silhouette normal. Aorta calcified. Right diaphragm elevated. Pulmonary vasculature indistinct. Ill-defined areas increase opacity at lung bases, greater on right. BILATERAL LOWER LUNG ATELECTASIS/PNEUMONIA VERSUS EDEMA. Us Dup Abd Pel Retro Scrot Complete    Result Date: 10/5/2020  EXAMINATION: Duplex liver Doppler CLINICAL HISTORY: Abnormal liver function studies. Abnormal CT. FINDINGS: Arterial duplex of the liver performed. . The study also evaluated the liver which was extremely heterogeneous in echotexture. There is ascites. There were poorly defined areas of mixed echogenicity in the right lobe of the liver with nondescript borders. A focal mass or confluence of multiple masses within the liver suspected. In conglomeration these measured 6.5 x 8.5 cm in the central right lobe of the liver. Liver tumor suspected either metastatic or primary. Occasionally fatty liver and cirrhosis can have an appearance such as this and therefore confirmation with contrast-enhanced cross-sectional imaging (CT or MRI) recommended to confirm this, if not noted on outside studies. There was flow in the splenic vein to the level of the confluence. The portal vein at this level showed no flow. The vessel itself was small in caliber measuring 8 mm in diameter. No flow in the branches of the portal vein within the liver demonstrated. Arterial flow in the hepatic artery has elevated systolic velocity amplitude. Left hepatic artery systolic velocity is 184 cm/s. Flow in the common hepatic artery is 111 cm per sec.  There is normal phasicity and waveform in the hepatic veins within the liver and in the IVC. The spleen was not evaluated. CHRONIC PORTAL VEIN THROMBOSIS. LIVER MASSES SUSPECTED CONSISTENT WITH MULTICENTRIC HEPATOCELLULAR CARCINOMA VERSUS METASTATIC DISEASE. CONFIRM WITH FURTHER CROSS-SECTIONAL IMAGING AS DISCUSSED IN THE REPORT. Us Guided Paracentesis    1. Status post technically successful ultrasound-guided paracentesis. Aime Brush is a Female of 70 years age, referred for Ultrasound Guided Paracentesis. PROCEDURE: Survey of the abdomen showed large amount of ascites fluid. After obtaining informed consent, the patient was positioned supine on the sonography table. Using ultrasound, the skin over the left hemiabdomen was locally anesthetized with 1% lidocaine. Following that, a Yueh needle was advanced into the fluid pocket using ultrasound visualization. 2330cc, of slightly turbid pink fluid were aspirated and sent for cytology, and pathology. The needle was removed, and hemostasis was obtained with pressure. A Band-Aid was placed. Post procedure images did not demonstrate hemorrhage at the target site. The patient tolerated the procedure well. The patient left the department in good condition. A radiology nurse was in presence monitoring vital signs, assisting throughout the procedure. Xr Hip 2-3 Vw W Pelvis Left    Result Date: 10/5/2020  EXAMINATION: AP pelvis left hip. 3 films CLINICAL HISTORY: Trauma. Posterior left hip pain FINDINGS: AP pelvis and hip and a shoot through lateral left hip performed. The bone is adequately mineralized and grossly intact. No fracture or dislocation. Moderate degenerative changes overlie the hip joints bilaterally. Mild degenerative changes in the lower lumbar spine. Vascular calcifications noted in the pelvis and proximal thighs bilaterally. MODERATE DEGENERATIVE HIP DISEASE. NO FRACTURE OR DISLOCATION.        Impression:   74-year-old -American female admitted with GI bleeding, patient reported 24-hour history of melanotic stools, noted hemoglobin of 4.8 on arrival, platelets 835, INR 1.2. Patient carries a diagnosis of cholangiocarcinoma and follows up with [facility] oncology. Recent history of esophageal variceal bleeding approximately 3 weeks ago requiring endoscopic hemostasis with EVL, no hematemesis. Plan:   1. Continue supportive course of care and ICU treatment  2. Serial H&H trend and transfuse to keep hemoglobin greater than 7.5  3. Continue octreotide, ceftriaxone, and PPI infusion  4. Endoscopic investigation pending clinical course and medical optimization, likely tomorrow  5. Will obtain medical records from recent hospitalization at [facility]    Comments: Thank you for allowing us to participate in the care of this patient. Will continue to follow. Please call if questions or concerns arise. A/P  Agree regarding assessment and plan. Reviewed chart and examined patient. Patient with recent EGD and  banding of large esophageal varices. Noted patient was also found to have large blood clot in the fundus. Patient was subsequently transferred to [facility] for further evaluation. Noted patient was readmitted owing to melena. We will obtain records from [facility] for review. Plan for EGD upon optimization. Noted hemoglobin 4.8 on arrival.  Ongoing resuscitation measures. Continue octreotide drip, SBP prophylaxis. Noted patient was recently diagnosed with  intrahepatic cholangiocarcinoma. Sky Bazan MD  Please note this report has been partially produced using speech recognition software and may cause contain errors related to that system including grammar, punctuation and spelling as well as words and phrases that may seem inappropriate. If there are questions or concerns please feel free to contact me to clarify.

## 2020-11-03 NOTE — H&P
Department of Internal Medicine   History and Physical        HISTORY OF PRESENT ILLNESS:      The patient is a 70 y.o. female with significant past medical history of metastatic cholangiocarcinoma with liver metastases, CKD stage III, hypertension and known esophageal varices, GI bleeding who presents with melanotic stools. Patient's daughter is at bedside provides much of the history. She reports that beginning this morning the patient has had about 4 plum colored large volume stools. She reports one episode of hematemesis earlier today as well. On chart review the patient was recently discharged 10/14 after evaluation for GI bleeding. At that time she underwent an EGD that showed severe large esophageal varices with a large clot in the stomach concerning for gastric variceal bleeding as well. Case was discussed with Dr. Macy North and patient and her family and she was transferred to [address] [place] [address] for further care. Patient was seen and examined in the emergency department. She is nontachycardic. Blood pressure stable. She did have another large melanotic stool. IV Protonix and a type and screen have been ordered. Confirmed with the family that the patient preferred to stay here at [facility].  Given that hemodynamics are stable, patient will be transferred to the medical floor for further care. Past Medical History:        Diagnosis Date    Ascites     Cancer [facility])     liver    CKD (chronic kidney disease)     Depression     Diabetes mellitus ([facility]     History of blood transfusion     Hydronephrosis     Hypertension     Pneumonia     Upper GI bleed      Past Surgical History:        Procedure Laterality Date    PARACENTESIS Left 10/06/2020    3330 cc by Dr. Mau Lr.     UPPER GASTROINTESTINAL ENDOSCOPY N/A 10/13/2020    EGD ESOPHAGOGASTRODUODENOSCOPY WITH BANDING to esophageal varices sites performed by Ruben Manzano MD at [facility]       Medications Prior to Admission: Not in a hospital admission. Allergies:  Patient has no known allergies. Social History:   Denies tobacco, alcohol or illicit drug use    Family History:       Problem Relation Age of Onset    Cancer Mother         stomach    Cancer Father         throat    Arthritis Neg Hx     Asthma Neg Hx     Birth Defects Neg Hx     Depression Neg Hx     Diabetes Neg Hx     Early Death Neg Hx     Hearing Loss Neg Hx     Heart Disease Neg Hx     High Blood Pressure Neg Hx     High Cholesterol Neg Hx     Kidney Disease Neg Hx     Learning Disabilities Neg Hx     Mental Illness Neg Hx     Mental Retardation Neg Hx     Miscarriages / Stillbirths Neg Hx     Stroke Neg Hx     Substance Abuse Neg Hx     Vision Loss Neg Hx     Other Neg Hx      REVIEW OF SYSTEMS:  12 point review of systems is negative unless noted above  PHYSICAL EXAM:    Vitals:  BP (!) 129/51   Pulse 79   Temp 99.7 °F (37.6 °C)   Resp 18   Ht 5' 3\" (1.6 m)   Wt 140 lb (63.5 kg)   SpO2 97%   BMI 24.80 kg/m²     72-year-old -American female. Resting comfortably prior to entering the room. She is frail-appearing. Normocephalic, atraumatic, pupils are equal and reactive to light. Conjunctival pallor is noted  Lungs are clear to auscultation, no wheezing, rhonchi or increased work of breathing  Regular rate and rhythm, no murmur  Abdomen is soft, nontender to palpation  Lower extremities with trace edema bilaterally. No erythema or asymmetry  Patient had a large melanotic stool. She is alert and oriented to person, location and month. No rashes or lesions of the skin on exposed areas.     DATA:  CBC:   Lab Results   Component Value Date    WBC 8.5 11/03/2020    RBC 1.94 11/03/2020    HGB 4.8 11/03/2020    HCT 15.1 11/03/2020    MCV 77.7 11/03/2020    MCH 24.9 11/03/2020    MCHC 32.0 11/03/2020    RDW 21.2 11/03/2020     11/03/2020     CBC with Differential:    Lab Results   Component Value Date    WBC 8.5 11/03/2020 RBC 1.94 11/03/2020    HGB 4.8 11/03/2020    HCT 15.1 11/03/2020     11/03/2020    MCV 77.7 11/03/2020    MCH 24.9 11/03/2020    MCHC 32.0 11/03/2020    RDW 21.2 11/03/2020    NRBC 1 10/07/2020    BANDSPCT 1 09/24/2020    LYMPHOPCT 11.0 10/23/2020    MONOPCT 6.7 10/23/2020    BASOPCT 1.5 10/23/2020    MONOSABS 0.4 10/23/2020    LYMPHSABS 0.7 10/23/2020    EOSABS 0.0 10/23/2020    BASOSABS 0.0 10/23/2020     CMP:    Lab Results   Component Value Date     11/03/2020    K 5.3 11/03/2020    K 4.0 10/09/2020     11/03/2020    CO2 26 11/03/2020    BUN 63 11/03/2020    CREATININE 3.38 11/03/2020    GFRAA 16.1 11/03/2020    LABGLOM 13.3 11/03/2020    GLUCOSE 62 11/03/2020    GLUCOSE 576 07/13/2020    PROT 5.9 11/03/2020    LABALBU 1.9 11/03/2020    LABALBU 2.7 07/13/2020    CALCIUM 7.4 11/03/2020    BILITOT 0.8 11/03/2020    ALKPHOS 472 11/03/2020    AST 96 11/03/2020    ALT 44 11/03/2020     BMP:    Lab Results   Component Value Date     11/03/2020    K 5.3 11/03/2020    K 4.0 10/09/2020     11/03/2020    CO2 26 11/03/2020    BUN 63 11/03/2020    LABALBU 1.9 11/03/2020    LABALBU 2.7 07/13/2020    CREATININE 3.38 11/03/2020    CALCIUM 7.4 11/03/2020    GFRAA 16.1 11/03/2020    LABGLOM 13.3 11/03/2020    GLUCOSE 62 11/03/2020    GLUCOSE 576 07/13/2020     ASSESSMENT AND PLAN:      82-year-old -American female with a past medical history of metastatic cholangiocarcinoma, cirrhosis with complication of esophageal varices presenting with GI bleed    GIB: Hemoglobin is 4.8 on presentation. Some concerning for recurrence of her upper GI bleeding, and known esophageal varices. She will likely need a least 2 units, blood has been ordered.    Hemodynamically stable  -Admit ICU per protocol  - Hold ASA  - NPO  -Trend H&H every 6 hours  -Start Protonix drip  -Gastroenterology consult, Abx per GI     Cholangiocarcinoma, with liver mets    T2DM and Hypoglycemia: D5 PRN  - SS, hypoglycemia protocol    IAIN on CKD stage III: Suspect prerenal d/t low volume status  -Trend BMP  -Strict I's and O's monitor urine output    Hypertension:  - Home medications held       CODE STATUS was confirmed with the patient and her daughter at bedside, she wishes to remain full code.   DVT prophylaxis: SCDs

## 2020-11-03 NOTE — ACP (ADVANCE CARE PLANNING)
machine) if it were available to you? \"     Would the patient desire the use of ventilator (breathing machine)?: Yes      Resuscitation  \"CPR works best to restart the heart when there is a sudden event, like a heart attack, in someone who is otherwise healthy. Unfortunately, CPR does not typically restart the heart for people who have serious health conditions or who are very sick. \"    \"In the event your heart stopped as a result of an underlying serious health condition, would you want attempts to be made to restart your heart (answer \"yes\" for attempt to resuscitate) or would you prefer a natural death (answer \"no\" for do not attempt to resuscitate)? \" yes      NOTE: If the patient has a valid advance directive AND now provides care preference(s) that are inconsistent with that prior directive, advise the patient to consider either: creating a new advance directive that complies with state-specific requirements; or, if that is not possible, orally revoking that prior directive in accordance with state-specific requirements, which must be documented in the EHR. [x] Yes   [] No   Educated Patient / Merly De La Rosa regarding differences between Advance Directives and portable DNR orders.     Length of ACP Conversation in minutes:      Conversation Outcomes:  [x] ACP discussion completed  [x] Existing advance directive reviewed with patient; no changes to patient's previously recorded wishes  [] New Advance Directive completed  [] Portable Do Not Rescitate prepared for Provider review and signature  [] POLST/POST/MOLST/MOST prepared for Provider review and signature      Follow-up plan:    [x] Schedule follow-up conversation to continue planning  [] Referred individual to Provider for additional questions/concerns   [] Advised patient/agent/surrogate to review completed ACP document and update if needed with changes in condition, patient preferences or care setting    [] This note routed to one or more involved

## 2020-11-04 ENCOUNTER — ANESTHESIA (OUTPATIENT)
Dept: ENDOSCOPY | Age: 75
DRG: 432 | End: 2020-11-04
Payer: MEDICARE

## 2020-11-04 ENCOUNTER — ANCILLARY PROCEDURE (OUTPATIENT)
Dept: ENDOSCOPY | Age: 75
DRG: 432 | End: 2020-11-04
Payer: MEDICARE

## 2020-11-04 ENCOUNTER — ANESTHESIA EVENT (OUTPATIENT)
Dept: ENDOSCOPY | Age: 75
DRG: 432 | End: 2020-11-04
Payer: MEDICARE

## 2020-11-04 ENCOUNTER — APPOINTMENT (OUTPATIENT)
Dept: GENERAL RADIOLOGY | Age: 75
DRG: 432 | End: 2020-11-04
Payer: MEDICARE

## 2020-11-04 VITALS — OXYGEN SATURATION: 100 % | SYSTOLIC BLOOD PRESSURE: 117 MMHG | DIASTOLIC BLOOD PRESSURE: 59 MMHG

## 2020-11-04 LAB
ALBUMIN SERPL-MCNC: 1.7 G/DL (ref 3.5–4.6)
ALP BLD-CCNC: 403 U/L (ref 40–130)
ALT SERPL-CCNC: 41 U/L (ref 0–33)
ANION GAP SERPL CALCULATED.3IONS-SCNC: 11 MEQ/L (ref 9–15)
AST SERPL-CCNC: 99 U/L (ref 0–35)
BASE EXCESS ARTERIAL: -1 (ref -3–3)
BASE EXCESS ARTERIAL: -1 (ref -3–3)
BILIRUB SERPL-MCNC: 1.2 MG/DL (ref 0.2–0.7)
BLOOD BANK DISPENSE STATUS: NORMAL
BLOOD BANK DISPENSE STATUS: NORMAL
BLOOD BANK PRODUCT CODE: NORMAL
BLOOD BANK PRODUCT CODE: NORMAL
BPU ID: NORMAL
BPU ID: NORMAL
BUN BLDV-MCNC: 65 MG/DL (ref 8–23)
CALCIUM IONIZED: 1.12 MMOL/L (ref 1.12–1.32)
CALCIUM IONIZED: 1.13 MMOL/L (ref 1.12–1.32)
CALCIUM SERPL-MCNC: 7.5 MG/DL (ref 8.5–9.9)
CHLORIDE BLD-SCNC: 113 MEQ/L (ref 95–107)
CO2: 22 MEQ/L (ref 20–31)
CREAT SERPL-MCNC: 3.08 MG/DL (ref 0.5–0.9)
DESCRIPTION BLOOD BANK: NORMAL
DESCRIPTION BLOOD BANK: NORMAL
GFR AFRICAN AMERICAN: 17.9
GFR AFRICAN AMERICAN: 18
GFR AFRICAN AMERICAN: 20
GFR NON-AFRICAN AMERICAN: 14.8
GFR NON-AFRICAN AMERICAN: 15
GFR NON-AFRICAN AMERICAN: 17
GLOBULIN: 4.1 G/DL (ref 2.3–3.5)
GLUCOSE BLD-MCNC: 108 MG/DL (ref 60–115)
GLUCOSE BLD-MCNC: 120 MG/DL (ref 70–99)
GLUCOSE BLD-MCNC: 151 MG/DL (ref 60–115)
GLUCOSE BLD-MCNC: 151 MG/DL (ref 60–115)
GLUCOSE BLD-MCNC: 166 MG/DL (ref 60–115)
GLUCOSE BLD-MCNC: 183 MG/DL (ref 60–115)
GLUCOSE BLD-MCNC: 205 MG/DL (ref 60–115)
GLUCOSE BLD-MCNC: 79 MG/DL (ref 60–115)
GLUCOSE BLD-MCNC: 86 MG/DL (ref 60–115)
HCO3 ARTERIAL: 22.5 MMOL/L (ref 21–29)
HCO3 ARTERIAL: 24.1 MMOL/L (ref 21–29)
HCT VFR BLD CALC: 24 % (ref 37–47)
HCT VFR BLD CALC: 24.2 % (ref 37–47)
HCT VFR BLD CALC: 25.4 % (ref 37–47)
HCT VFR BLD CALC: 26.9 % (ref 37–47)
HCT VFR BLD CALC: 29.4 % (ref 37–47)
HEMOGLOBIN: 7.9 G/DL (ref 12–16)
HEMOGLOBIN: 7.9 G/DL (ref 12–16)
HEMOGLOBIN: 8 GM/DL (ref 12–16)
HEMOGLOBIN: 8.4 G/DL (ref 12–16)
HEMOGLOBIN: 8.5 GM/DL (ref 12–16)
HEMOGLOBIN: 8.8 G/DL (ref 12–16)
HEMOGLOBIN: 9.9 G/DL (ref 12–16)
INR BLD: 1.1
IRON SATURATION: 117 % (ref 11–46)
IRON: 96 UG/DL (ref 37–145)
LACTATE: 0.97 MMOL/L (ref 0.4–2)
LACTATE: 1.35 MMOL/L (ref 0.4–2)
MCH RBC QN AUTO: 27.9 PG (ref 27–31.3)
MCHC RBC AUTO-ENTMCNC: 32.9 % (ref 33–37)
MCV RBC AUTO: 84.7 FL (ref 82–100)
O2 SAT, ARTERIAL: 86 % (ref 93–100)
O2 SAT, ARTERIAL: 99 % (ref 93–100)
PCO2 ARTERIAL: 32 MM HG (ref 35–45)
PCO2 ARTERIAL: 38 MM HG (ref 35–45)
PDW BLD-RTO: 17.6 % (ref 11.5–14.5)
PERFORMED ON: ABNORMAL
PERFORMED ON: NORMAL
PH ARTERIAL: 7.41 (ref 7.35–7.45)
PH ARTERIAL: 7.46 (ref 7.35–7.45)
PLATELET # BLD: 163 K/UL (ref 130–400)
PO2 ARTERIAL: 149 MM HG (ref 75–108)
PO2 ARTERIAL: 48 MM HG (ref 75–108)
POC CHLORIDE: 112 MEQ/L (ref 99–110)
POC CHLORIDE: 113 MEQ/L (ref 99–110)
POC CREATININE: 2.8 MG/DL (ref 0.6–1.2)
POC CREATININE: 3 MG/DL (ref 0.6–1.2)
POC FIO2: 40
POC FIO2: 80
POC HEMATOCRIT: 24 % (ref 36–48)
POC HEMATOCRIT: 25 % (ref 36–48)
POC POTASSIUM: 4.3 MEQ/L (ref 3.5–5.1)
POC POTASSIUM: 4.4 MEQ/L (ref 3.5–5.1)
POC SAMPLE TYPE: ABNORMAL
POC SAMPLE TYPE: ABNORMAL
POC SODIUM: 145 MEQ/L (ref 136–145)
POC SODIUM: 146 MEQ/L (ref 136–145)
POTASSIUM REFLEX MAGNESIUM: 4.6 MEQ/L (ref 3.4–4.9)
PROTHROMBIN TIME: 14.6 SEC (ref 12.3–14.9)
RBC # BLD: 2.83 M/UL (ref 4.2–5.4)
SODIUM BLD-SCNC: 146 MEQ/L (ref 135–144)
TCO2 ARTERIAL: 24 (ref 22–29)
TCO2 ARTERIAL: 25 (ref 22–29)
TOTAL IRON BINDING CAPACITY: 82 UG/DL (ref 178–450)
TOTAL PROTEIN: 5.8 G/DL (ref 6.3–8)
WBC # BLD: 7.6 K/UL (ref 4.8–10.8)

## 2020-11-04 PROCEDURE — 84132 ASSAY OF SERUM POTASSIUM: CPT

## 2020-11-04 PROCEDURE — 6360000002 HC RX W HCPCS: Performed by: INTERNAL MEDICINE

## 2020-11-04 PROCEDURE — 71045 X-RAY EXAM CHEST 1 VIEW: CPT

## 2020-11-04 PROCEDURE — 2580000003 HC RX 258: Performed by: NURSE PRACTITIONER

## 2020-11-04 PROCEDURE — 2580000003 HC RX 258: Performed by: INTERNAL MEDICINE

## 2020-11-04 PROCEDURE — 06L38CZ OCCLUSION OF ESOPHAGEAL VEIN WITH EXTRALUMINAL DEVICE, VIA NATURAL OR ARTIFICIAL OPENING ENDOSCOPIC: ICD-10-PCS | Performed by: INTERNAL MEDICINE

## 2020-11-04 PROCEDURE — 99233 SBSQ HOSP IP/OBS HIGH 50: CPT | Performed by: NURSE PRACTITIONER

## 2020-11-04 PROCEDURE — 2580000003 HC RX 258

## 2020-11-04 PROCEDURE — 36415 COLL VENOUS BLD VENIPUNCTURE: CPT

## 2020-11-04 PROCEDURE — C9113 INJ PANTOPRAZOLE SODIUM, VIA: HCPCS | Performed by: INTERNAL MEDICINE

## 2020-11-04 PROCEDURE — 82803 BLOOD GASES ANY COMBINATION: CPT

## 2020-11-04 PROCEDURE — 99223 1ST HOSP IP/OBS HIGH 75: CPT | Performed by: INTERNAL MEDICINE

## 2020-11-04 PROCEDURE — 0BH17EZ INSERTION OF ENDOTRACHEAL AIRWAY INTO TRACHEA, VIA NATURAL OR ARTIFICIAL OPENING: ICD-10-PCS | Performed by: INTERNAL MEDICINE

## 2020-11-04 PROCEDURE — 5A1935Z RESPIRATORY VENTILATION, LESS THAN 24 CONSECUTIVE HOURS: ICD-10-PCS | Performed by: INTERNAL MEDICINE

## 2020-11-04 PROCEDURE — 83605 ASSAY OF LACTIC ACID: CPT

## 2020-11-04 PROCEDURE — 82330 ASSAY OF CALCIUM: CPT

## 2020-11-04 PROCEDURE — 6360000002 HC RX W HCPCS

## 2020-11-04 PROCEDURE — 3700000000 HC ANESTHESIA ATTENDED CARE: Performed by: INTERNAL MEDICINE

## 2020-11-04 PROCEDURE — 85610 PROTHROMBIN TIME: CPT

## 2020-11-04 PROCEDURE — 2500000003 HC RX 250 WO HCPCS

## 2020-11-04 PROCEDURE — 85027 COMPLETE CBC AUTOMATED: CPT

## 2020-11-04 PROCEDURE — 43244 EGD VARICES LIGATION: CPT | Performed by: INTERNAL MEDICINE

## 2020-11-04 PROCEDURE — 84295 ASSAY OF SERUM SODIUM: CPT

## 2020-11-04 PROCEDURE — 82435 ASSAY OF BLOOD CHLORIDE: CPT

## 2020-11-04 PROCEDURE — 6360000002 HC RX W HCPCS: Performed by: NURSE PRACTITIONER

## 2020-11-04 PROCEDURE — 3609017100 HC EGD: Performed by: INTERNAL MEDICINE

## 2020-11-04 PROCEDURE — 83540 ASSAY OF IRON: CPT

## 2020-11-04 PROCEDURE — 3700000001 HC ADD 15 MINUTES (ANESTHESIA): Performed by: INTERNAL MEDICINE

## 2020-11-04 PROCEDURE — 85014 HEMATOCRIT: CPT

## 2020-11-04 PROCEDURE — 6370000000 HC RX 637 (ALT 250 FOR IP): Performed by: INTERNAL MEDICINE

## 2020-11-04 PROCEDURE — 82565 ASSAY OF CREATININE: CPT

## 2020-11-04 PROCEDURE — 2000000000 HC ICU R&B

## 2020-11-04 PROCEDURE — 80053 COMPREHEN METABOLIC PANEL: CPT

## 2020-11-04 PROCEDURE — 85018 HEMOGLOBIN: CPT

## 2020-11-04 PROCEDURE — 83550 IRON BINDING TEST: CPT

## 2020-11-04 PROCEDURE — 94002 VENT MGMT INPAT INIT DAY: CPT

## 2020-11-04 PROCEDURE — 36600 WITHDRAWAL OF ARTERIAL BLOOD: CPT

## 2020-11-04 PROCEDURE — 2720000010 HC SURG SUPPLY STERILE: Performed by: INTERNAL MEDICINE

## 2020-11-04 PROCEDURE — 2709999900 HC NON-CHARGEABLE SUPPLY: Performed by: INTERNAL MEDICINE

## 2020-11-04 RX ORDER — 0.9 % SODIUM CHLORIDE 0.9 %
20 INTRAVENOUS SOLUTION INTRAVENOUS ONCE
Status: COMPLETED | OUTPATIENT
Start: 2020-11-04 | End: 2020-11-04

## 2020-11-04 RX ORDER — CHLORHEXIDINE GLUCONATE 0.12 MG/ML
15 RINSE ORAL 2 TIMES DAILY
Status: DISCONTINUED | OUTPATIENT
Start: 2020-11-04 | End: 2020-11-05

## 2020-11-04 RX ORDER — LIDOCAINE HYDROCHLORIDE 20 MG/ML
INJECTION, SOLUTION INFILTRATION; PERINEURAL PRN
Status: DISCONTINUED | OUTPATIENT
Start: 2020-11-04 | End: 2020-11-04 | Stop reason: SDUPTHER

## 2020-11-04 RX ORDER — ONDANSETRON 2 MG/ML
4 INJECTION INTRAMUSCULAR; INTRAVENOUS
Status: ACTIVE | OUTPATIENT
Start: 2020-11-04 | End: 2020-11-04

## 2020-11-04 RX ORDER — SUCCINYLCHOLINE/SOD CL,ISO/PF 100 MG/5ML
SYRINGE (ML) INTRAVENOUS PRN
Status: DISCONTINUED | OUTPATIENT
Start: 2020-11-04 | End: 2020-11-04 | Stop reason: SDUPTHER

## 2020-11-04 RX ORDER — ROCURONIUM BROMIDE 10 MG/ML
INJECTION, SOLUTION INTRAVENOUS PRN
Status: DISCONTINUED | OUTPATIENT
Start: 2020-11-04 | End: 2020-11-04 | Stop reason: SDUPTHER

## 2020-11-04 RX ORDER — PROPOFOL 10 MG/ML
INJECTION, EMULSION INTRAVENOUS PRN
Status: DISCONTINUED | OUTPATIENT
Start: 2020-11-04 | End: 2020-11-04 | Stop reason: SDUPTHER

## 2020-11-04 RX ORDER — SODIUM CHLORIDE 9 MG/ML
10 INJECTION INTRAVENOUS 2 TIMES DAILY
Status: DISCONTINUED | OUTPATIENT
Start: 2020-11-04 | End: 2020-11-09 | Stop reason: HOSPADM

## 2020-11-04 RX ORDER — SODIUM CHLORIDE 0.9 % (FLUSH) 0.9 %
10 SYRINGE (ML) INJECTION PRN
Status: DISCONTINUED | OUTPATIENT
Start: 2020-11-04 | End: 2020-11-09 | Stop reason: HOSPADM

## 2020-11-04 RX ORDER — 0.9 % SODIUM CHLORIDE 0.9 %
500 INTRAVENOUS SOLUTION INTRAVENOUS ONCE
Status: COMPLETED | OUTPATIENT
Start: 2020-11-04 | End: 2020-11-04

## 2020-11-04 RX ORDER — SODIUM CHLORIDE 9 MG/ML
INJECTION, SOLUTION INTRAVENOUS
Status: COMPLETED
Start: 2020-11-04 | End: 2020-11-04

## 2020-11-04 RX ORDER — SODIUM CHLORIDE 9 MG/ML
INJECTION, SOLUTION INTRAVENOUS CONTINUOUS
Status: DISCONTINUED | OUTPATIENT
Start: 2020-11-04 | End: 2020-11-05

## 2020-11-04 RX ORDER — PROPOFOL 10 MG/ML
10 INJECTION, EMULSION INTRAVENOUS
Status: DISCONTINUED | OUTPATIENT
Start: 2020-11-04 | End: 2020-11-05

## 2020-11-04 RX ORDER — PANTOPRAZOLE SODIUM 40 MG/10ML
40 INJECTION, POWDER, LYOPHILIZED, FOR SOLUTION INTRAVENOUS 2 TIMES DAILY
Status: DISCONTINUED | OUTPATIENT
Start: 2020-11-04 | End: 2020-11-09 | Stop reason: HOSPADM

## 2020-11-04 RX ORDER — LIDOCAINE HYDROCHLORIDE 10 MG/ML
1 INJECTION, SOLUTION EPIDURAL; INFILTRATION; INTRACAUDAL; PERINEURAL
Status: ACTIVE | OUTPATIENT
Start: 2020-11-04 | End: 2020-11-04

## 2020-11-04 RX ORDER — SODIUM CHLORIDE 0.9 % (FLUSH) 0.9 %
10 SYRINGE (ML) INJECTION EVERY 12 HOURS SCHEDULED
Status: DISCONTINUED | OUTPATIENT
Start: 2020-11-04 | End: 2020-11-09 | Stop reason: HOSPADM

## 2020-11-04 RX ORDER — ONDANSETRON 2 MG/ML
INJECTION INTRAMUSCULAR; INTRAVENOUS PRN
Status: DISCONTINUED | OUTPATIENT
Start: 2020-11-04 | End: 2020-11-04 | Stop reason: SDUPTHER

## 2020-11-04 RX ORDER — SODIUM CHLORIDE 9 MG/ML
INJECTION, SOLUTION INTRAVENOUS CONTINUOUS PRN
Status: DISCONTINUED | OUTPATIENT
Start: 2020-11-04 | End: 2020-11-04 | Stop reason: SDUPTHER

## 2020-11-04 RX ADMIN — SODIUM CHLORIDE: 9 INJECTION, SOLUTION INTRAVENOUS at 16:22

## 2020-11-04 RX ADMIN — PROPOFOL 45 MCG/KG/MIN: 10 INJECTION, EMULSION INTRAVENOUS at 19:27

## 2020-11-04 RX ADMIN — FENTANYL CITRATE 25 MCG/HR: 50 INJECTION, SOLUTION INTRAMUSCULAR; INTRAVENOUS at 18:33

## 2020-11-04 RX ADMIN — Medication 10 ML: at 20:10

## 2020-11-04 RX ADMIN — Medication 100 MG: at 12:28

## 2020-11-04 RX ADMIN — INSULIN LISPRO 2 UNITS: 100 INJECTION, SOLUTION INTRAVENOUS; SUBCUTANEOUS at 20:11

## 2020-11-04 RX ADMIN — PROPOFOL 100 MG: 10 INJECTION, EMULSION INTRAVENOUS at 12:20

## 2020-11-04 RX ADMIN — Medication 500 ML: at 12:01

## 2020-11-04 RX ADMIN — ONDANSETRON 4 MG: 2 INJECTION INTRAMUSCULAR; INTRAVENOUS at 12:26

## 2020-11-04 RX ADMIN — SODIUM CHLORIDE: 9 INJECTION, SOLUTION INTRAVENOUS at 12:49

## 2020-11-04 RX ADMIN — Medication 10 ML: at 21:10

## 2020-11-04 RX ADMIN — SODIUM CHLORIDE 8 MG/HR: 9 INJECTION, SOLUTION INTRAVENOUS at 11:02

## 2020-11-04 RX ADMIN — SODIUM CHLORIDE 500 ML: 9 INJECTION, SOLUTION INTRAVENOUS at 12:01

## 2020-11-04 RX ADMIN — OCTREOTIDE ACETATE 50 MCG/HR: 1000 INJECTION, SOLUTION INTRAVENOUS; SUBCUTANEOUS at 00:55

## 2020-11-04 RX ADMIN — PANTOPRAZOLE SODIUM 40 MG: 40 INJECTION, POWDER, FOR SOLUTION INTRAVENOUS at 20:09

## 2020-11-04 RX ADMIN — Medication 10 ML: at 20:09

## 2020-11-04 RX ADMIN — OCTREOTIDE ACETATE 50 MCG/HR: 1000 INJECTION, SOLUTION INTRAVENOUS; SUBCUTANEOUS at 11:01

## 2020-11-04 RX ADMIN — SODIUM CHLORIDE 8 MG/HR: 9 INJECTION, SOLUTION INTRAVENOUS at 03:40

## 2020-11-04 RX ADMIN — ROCURONIUM BROMIDE 50 MG: 10 SOLUTION INTRAVENOUS at 12:36

## 2020-11-04 RX ADMIN — PROPOFOL 100 MG: 10 INJECTION, EMULSION INTRAVENOUS at 12:28

## 2020-11-04 RX ADMIN — PROPOFOL 10 MCG/KG/MIN: 10 INJECTION, EMULSION INTRAVENOUS at 13:39

## 2020-11-04 RX ADMIN — DEXTROSE AND SODIUM CHLORIDE: 5; 450 INJECTION, SOLUTION INTRAVENOUS at 07:46

## 2020-11-04 RX ADMIN — CEFTRIAXONE SODIUM 1 G: 1 INJECTION, POWDER, FOR SOLUTION INTRAMUSCULAR; INTRAVENOUS at 17:37

## 2020-11-04 RX ADMIN — LIDOCAINE HYDROCHLORIDE 40 MG: 20 INJECTION, SOLUTION INFILTRATION; PERINEURAL at 12:20

## 2020-11-04 RX ADMIN — SODIUM CHLORIDE: 9 INJECTION, SOLUTION INTRAVENOUS at 12:20

## 2020-11-04 RX ADMIN — OCTREOTIDE ACETATE 50 MCG/HR: 1000 INJECTION, SOLUTION INTRAVENOUS; SUBCUTANEOUS at 20:09

## 2020-11-04 RX ADMIN — Medication 10 ML: at 07:55

## 2020-11-04 RX ADMIN — SODIUM CHLORIDE 20 ML: 9 INJECTION, SOLUTION INTRAVENOUS at 14:30

## 2020-11-04 RX ADMIN — CHLORHEXIDINE GLUCONATE 15 ML: 1.2 RINSE ORAL at 20:09

## 2020-11-04 ASSESSMENT — PULMONARY FUNCTION TESTS
PIF_VALUE: 35
PIF_VALUE: 22
PIF_VALUE: 34
PIF_VALUE: 35
PIF_VALUE: 1
PIF_VALUE: 0
PIF_VALUE: 19
PIF_VALUE: 12
PIF_VALUE: 20
PIF_VALUE: 26
PIF_VALUE: 21
PIF_VALUE: 34
PIF_VALUE: 41
PIF_VALUE: 0
PIF_VALUE: 0
PIF_VALUE: 33
PIF_VALUE: 35
PIF_VALUE: 1
PIF_VALUE: 34
PIF_VALUE: 1
PIF_VALUE: 32
PIF_VALUE: 34
PIF_VALUE: 19
PIF_VALUE: 35
PIF_VALUE: 36
PIF_VALUE: 0
PIF_VALUE: 22
PIF_VALUE: 32
PIF_VALUE: 24
PIF_VALUE: 35
PIF_VALUE: 35
PIF_VALUE: 1
PIF_VALUE: 35
PIF_VALUE: 1
PIF_VALUE: 0
PIF_VALUE: 35
PIF_VALUE: 27
PIF_VALUE: 1
PIF_VALUE: 35
PIF_VALUE: 0
PIF_VALUE: 35
PIF_VALUE: 33
PIF_VALUE: 31
PIF_VALUE: 33
PIF_VALUE: 34
PIF_VALUE: 35
PIF_VALUE: 0
PIF_VALUE: 35
PIF_VALUE: 1
PIF_VALUE: 0
PIF_VALUE: 1
PIF_VALUE: 35
PIF_VALUE: 33
PIF_VALUE: 35
PIF_VALUE: 34
PIF_VALUE: 2
PIF_VALUE: 32
PIF_VALUE: 39
PIF_VALUE: 0
PIF_VALUE: 0
PIF_VALUE: 33
PIF_VALUE: 1
PIF_VALUE: 0
PIF_VALUE: 34
PIF_VALUE: 22
PIF_VALUE: 30
PIF_VALUE: 35
PIF_VALUE: 19
PIF_VALUE: 35
PIF_VALUE: 1
PIF_VALUE: 35
PIF_VALUE: 0
PIF_VALUE: 34
PIF_VALUE: 34
PIF_VALUE: 0
PIF_VALUE: 33
PIF_VALUE: 34
PIF_VALUE: 34
PIF_VALUE: 1
PIF_VALUE: 35
PIF_VALUE: 1
PIF_VALUE: 35

## 2020-11-04 ASSESSMENT — PAIN SCALES - GENERAL
PAINLEVEL_OUTOF10: 0

## 2020-11-04 NOTE — PROGRESS NOTES
D5.45NS off for procedure. NS 500ml to #20 to rt forearm site. Pantoprazole 80 mg IVPB infusing at 8 mg/h (10ml/hr) and Octreotide at 50 mcg/hr (10 ml/hr) tp #22 right hand site.

## 2020-11-04 NOTE — FLOWSHEET NOTE
Pt brought back from oscopy intubated. 1 unit of RBC's currently infusing. Pt attached to monitor and vent per RT. VSS at this time. New orders received.

## 2020-11-04 NOTE — CONSULTS
Pulmonary and Critical Care Medicine  Consult Note  Encounter Date: 2020 11:26 AM    Ms. Chai Dowling is a 70 y.o. female  : 1945  Requesting Provider: Marcia Quiles DO    Reason for request: Gi bleed             HISTORY OF PRESENT ILLNESS:    Patient is 70 y.o. presents with GI bleed, she was admitted yesterday, with black tarry stool, no hematemesis, no fever or chills, main complaint is itching which is chronic and related to underlying liver disease, no abdominal pain, no shortness of breath. Patient has history of cholangiocarcinoma, with multiple liver masses, she follows up with oncology at [facility]. Past Medical History:        Diagnosis Date    Ascites     Cancer [facility])     liver    CKD (chronic kidney disease)     Depression     Diabetes mellitus ([facility] [address]     History of blood transfusion     Hydronephrosis     Hypertension     Pneumonia     Upper GI bleed        Past Surgical History:        Procedure Laterality Date    PARACENTESIS Left 10/06/2020    3330 cc by Dr. Cristina Samuels.  UPPER GASTROINTESTINAL ENDOSCOPY N/A 10/13/2020    EGD ESOPHAGOGASTRODUODENOSCOPY WITH BANDING to esophageal varices sites performed by Ronnie Peralta MD at [facility] [address] History:     reports that she has never smoked. She has never used smokeless tobacco. She reports that she does not drink alcohol or use drugs.     Family History:       Problem Relation Age of Onset    Cancer Mother         stomach    Cancer Father         throat    Arthritis Neg Hx     Asthma Neg Hx     Birth Defects Neg Hx     Depression Neg Hx     Diabetes Neg Hx     Early Death Neg Hx     Hearing Loss Neg Hx     Heart Disease Neg Hx     High Blood Pressure Neg Hx     High Cholesterol Neg Hx     Kidney Disease Neg Hx     Learning Disabilities Neg Hx     Mental Illness Neg Hx     Mental Retardation Neg Hx     Miscarriages / Stillbirths Neg Hx     Stroke Neg Hx     Substance Abuse Neg Hx     Vision Loss Neg Hx     Other Neg Hx        Allergies:  Patient has no known allergies. MEDICATIONS during current hospitalization:    Continuous Infusions:   pantoprozole (PROTONIX) infusion 8 mg/hr (11/04/20 1102)    octreotide (SANDOSTATIN) infusion 50 mcg/hr (11/04/20 1101)    dextrose Stopped (11/03/20 1750)    dextrose 5 % and 0.45 % NaCl 75 mL/hr at 11/04/20 0746       Scheduled Meds:   sodium chloride  20 mL Intravenous Once    sodium chloride flush  10 mL Intravenous 2 times per day    cefTRIAXone (ROCEPHIN) IV  1 g Intravenous Q24H    insulin lispro  0-6 Units Subcutaneous Q4H       PRN Meds:ondansetron, sodium chloride flush, acetaminophen **OR** acetaminophen, promethazine **OR** ondansetron, albuterol, glucose, dextrose, glucagon (rDNA), dextrose        REVIEW OF SYSTEMS:  ROS: 10 organs review of system is done including general, psychological, ENT, hematological, endocrine, respiratory, cardiovascular, gastrointestinal, musculoskeletal, neurological,  allergy and Immunology is done and is otherwise negative. PHYSICAL EXAM:    Vitals:  BP (!) 125/50   Pulse 77   Temp 98.3 °F (36.8 °C)   Resp 11   Ht 5' 3\" (1.6 m)   Wt 141 lb 12.1 oz (64.3 kg)   SpO2 100%   BMI 25.11 kg/m²     General: alert, cooperative, no distress  Head: normocephalic, atraumatic  Eyes:No gross abnormalities. ENT:  MMM no lesions  Neck:  supple and no masses  Chest : clear to auscultation bilaterally- no wheezes, rales or rhonchi, normal air movement, no respiratory distress  Heart[de-identified] Heart sounds are normal.  Regular rate and rhythm without murmur, gallop or rub. ABD: Distended, nontender, soft no guarding or rebound  Musculoskeletal : no cyanosis, no clubbing and 2 + edema-  bilateral lower extremity  Neuro:  Grossly normal  Skin: No rashes or nodules noted.   Lymph node:  no cervical nodes  Urology: No Hanks   Psychiatric: appropriate        Data Review  Recent Labs     11/03/20  1045 11/03/20  1516 11/04/20  0110 11/04/20  0528   WBC 8.5  --   --  7.6   HGB 4.8* 6.7* 7.9* 7.9*   HCT 15.1* 21.6* 24.2* 24.0*     --   --  163      Recent Labs     11/03/20  1045 11/04/20  0906    146*   K 5.3* 4.6    113*   CO2 26 22   BUN 63* 65*   CREATININE 3.38* 3.08*   GLUCOSE 62* 120*       MV Settings: ABGs: No results for input(s): PHART, UYB5STJ, PO2ART, SXU1ECQ, BEART, V1WJQDDI, ZKB4DNF in the last 72 hours.   O2 Device: None (Room air)  Lab Results   Component Value Date    LACTA 1.6 10/05/2020    LACTA 1.7 10/04/2020       Radiology  I personally reviewed imaging studies and no infiltrate        Assessment, plan:   Patient is at risk due to    · Acute blood loss anemia, secondary to GI bleed likely upper status post transfusion 3 units packed RBC currently no active bleed  · GI bleed, most probably brisk variceal bleed  · Itching secondary to cholangiocarcinoma  · Cholangiocarcinoma  · Acute on chronic renal failure likely secondary to #1    Recommendation  · Patient scheduled for endoscopy today  · Continue to monitor hemoglobin  · Gentle hydration  · Watch volume status and avoid overload  · Continue Rocephin  · Continue octreotide and Protonix  · Monitor renal function and urine output  · Monitor blood sugar target 1 40-1 80  · Watch in ICU post procedure if remains stable will consider transfer to floor           Thank you for consultation    Electronically signed by Michelle Vasquez MD, [facility],  on 11/4/2020 at 11:26 AM

## 2020-11-04 NOTE — PROGRESS NOTES
and month. No rashes or lesions of the skin on exposed areas. Labs:   Recent Labs     11/03/20  1045 11/03/20  1856 11/04/20  0110 11/04/20  0528   WBC 8.5  --   --  7.6   HGB 4.8* 6.7* 7.9* 7.9*   HCT 15.1* 21.6* 24.2* 24.0*     --   --  163     Recent Labs     11/03/20  1045 11/04/20  0906    146*   K 5.3* 4.6    113*   CO2 26 22   BUN 63* 65*   CREATININE 3.38* 3.08*   CALCIUM 7.4* 7.5*     Recent Labs     11/03/20  1045 11/04/20  0906   AST 96* 99*   ALT 44* 41*   BILITOT 0.8* 1.2*   ALKPHOS 472* 403*     Recent Labs     11/03/20  1045   INR 1.2     No results for input(s): CKTOTAL, TROPONINI in the last 72 hours. Urinalysis:      Lab Results   Component Value Date    NITRU Negative 10/04/2020    WBCUA 0-2 10/04/2020    WBCUA 2 07/13/2020    BACTERIA Negative 10/04/2020    RBCUA 20-50 10/04/2020    RBCUA 9 07/13/2020    BLOODU MODERATE 10/04/2020    SPECGRAV 1.012 10/04/2020    GLUCOSEU Negative 10/04/2020       Radiology:  No orders to display           Assessment/Plan:    Active Hospital Problems    Diagnosis Date Noted    GIB (gastrointestinal bleeding) [K92.2] 11/03/2020        72-year-old -American female with a past medical history of metastatic cholangiocarcinoma, cirrhosis with complication of esophageal varices presenting with GI bleed     GIB: Hemoglobin is 4.8 on presentation. Some concerning for recurrence of her upper GI bleeding, and known esophageal varices. S/p transfusion with improvement.  Hemodynamically stable  - Hold ASA  - NPO  -Protonix, Octreotide drip, IV rocephin  -Gastroenterology consult, EGD today     Cholangiocarcinoma, with liver mets     T2DM and Hypoglycemia: D5 PRN  - SS, hypoglycemia protocol     IAIN on CKD stage III: Suspect prerenal d/t low volume status  -Trend BMP  -Strict I's and O's monitor urine output     Hypertension:  - Home medications held          Diet: Diet NPO Effective Now Exceptions are: Ice Chips    Code Status: Full Code    PT/OT Eval           Electronically signed by Kandra Hodgkin, DO on 11/4/2020 at 10:37 AM

## 2020-11-04 NOTE — ANESTHESIA PRE PROCEDURE
Department of Anesthesiology  Preprocedure Note       Name:  Levern Denver   Age:  70 y.o.  :  1945                                          MRN:  [id number]         Date:  2020      Surgeon: Trina Becerra):  Asha Hunt MD    Procedure: Procedure(s):  EGD ESOPHAGOGASTRODUODENOSCOPY    Medications prior to admission:   Prior to Admission medications    Medication Sig Start Date End Date Taking?  Authorizing Provider   insulin lispro (HUMALOG) 100 UNIT/ML injection vial Inject 10 Units into the skin 3 times daily (before meals) 10/10/20  Yes Katiana Wong MD   hydrALAZINE (APRESOLINE) 50 MG tablet Take 1 tablet by mouth every 8 hours 10/9/20  Yes Katiana Wong MD   insulin glargine (LANTUS) 100 UNIT/ML injection vial Inject 20 Units into the skin daily (before lunch) 10/9/20  Yes Katiana Wong MD   albuterol sulfate HFA (PROVENTIL HFA) 108 (90 Base) MCG/ACT inhaler Inhale 2 puffs into the lungs every 6 hours as needed for Wheezing 10/9/20  Yes Katiana Wong MD   amLODIPine (NORVASC) 10 MG tablet Take 10 mg by mouth daily   Yes Historical Provider, MD   aspirin 81 MG EC tablet Take 81 mg by mouth daily    Historical Provider, MD   lactulose (CHRONULAC) 10 GM/15ML solution Take 30 mLs by mouth 2 times daily 10/9/20   Katiana Wong MD   torsemide (DEMADEX) 20 MG tablet Take 2 tablets by mouth daily 10/10/20   Katiana Wong MD       Current medications:    Current Facility-Administered Medications   Medication Dose Route Frequency Provider Last Rate Last Dose    0.9 % sodium chloride bolus  20 mL Intravenous Once Laurita Niece, DO        sodium chloride flush 0.9 % injection 10 mL  10 mL Intravenous 2 times per day Laurita Niece, DO   10 mL at 20 0755    sodium chloride flush 0.9 % injection 10 mL  10 mL Intravenous PRN Laurita Niece, DO        acetaminophen (TYLENOL) tablet 650 mg  650 mg Oral Q6H PRN Laurita Scott, DO        Or    acetaminophen (TYLENOL) suppository 650 mg  650 mg Rectal Q6H PRN Aayush James Kameron,         promethazine (PHENERGAN) tablet 12.5 mg  12.5 mg Oral Q6H PRN Ladena Meño, DO        Or    ondansetron (ZOFRAN) injection 4 mg  4 mg Intravenous Q6H PRN Ladena Meño, DO        pantoprazole (PROTONIX) 80 mg in sodium chloride 0.9 % 100 mL infusion  8 mg/hr Intravenous Continuous Ladena Meño, DO 10 mL/hr at 11/04/20 0340 8 mg/hr at 11/04/20 0340    octreotide (SANDOSTATIN) 500 mcg in sodium chloride 0.9 % 100 mL infusion  50 mcg/hr Intravenous Continuous [facility]ilvinae DARY Pelaez - CNP 10 mL/hr at 11/04/20 0055 50 mcg/hr at 11/04/20 0055    albuterol (PROVENTIL) nebulizer solution 2.5 mg  2.5 mg Nebulization Q4H PRN Ladena Meño, DO        cefTRIAXone (ROCEPHIN) 1 g IVPB in 50 mL D5W minibag  1 g Intravenous Q24H Ana Pelaez APRN - CNP   Stopped at 11/03/20 1600    glucose (GLUTOSE) 40 % oral gel 15 g  15 g Oral PRN [facility], [facility]        dextrose 50 % IV solution  12.5 g Intravenous PRN [facility]   12.5 g at 11/03/20 1817    glucagon (rDNA) injection 1 mg  1 mg Intramuscular PRN Ladena Meño, DO        dextrose 5 % solution  100 mL/hr Intravenous PRN Ladena Meño, DO   Stopped at 11/03/20 1750    insulin lispro (HUMALOG) injection vial 0-6 Units  0-6 Units Subcutaneous Q4H [facility] Meño, DO   Stopped at 11/03/20 1615    dextrose 5 % and 0.45 % sodium chloride infusion   Intravenous Continuous [facility], DO 75 mL/hr at 11/04/20 0746         Allergies:  No Known Allergies    Problem List:    Patient Active Problem List   Diagnosis Code    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    Chronic kidney disease, stage III (moderate) N18.30    Weakness R53.1    Uncontrolled type 2 diabetes mellitus with hyperglycemia (HCC) E11.65    Pneumonia of right lower lobe due to infectious organism J18.9    Hydronephrosis N13.30    Upper GI bleed K92.2    HTN (hypertension) I10    Ascites R18.8    Hematemesis K92.0    Secondary esophageal varices with bleeding (HCC) I85.11    GIB (gastrointestinal bleeding) K92.2       Past Medical History:        Diagnosis Date    Ascites     Cancer [facility])     liver    CKD (chronic kidney disease)     Depression     Diabetes mellitus ([address]     History of blood transfusion     Hydronephrosis     Hypertension     Pneumonia     Upper GI bleed        Past Surgical History:        Procedure Laterality Date    PARACENTESIS Left 10/06/2020    3330 cc by Dr. Nory Norwood.  UPPER GASTROINTESTINAL ENDOSCOPY N/A 10/13/2020    EGD ESOPHAGOGASTRODUODENOSCOPY WITH BANDING to esophageal varices sites performed by Leo Solorio MD at [facility] [address] History:    Social History     Tobacco Use    Smoking status: Never Smoker    Smokeless tobacco: Never Used   Substance Use Topics    Alcohol use: No                                Counseling given: Not Answered      Vital Signs (Current):   Vitals:    11/04/20 0700 11/04/20 0800 11/04/20 0900 11/04/20 1000   BP: (!) 144/61 (!) 126/39 (!) 129/56 (!) 125/50   Pulse: 75 70 77 77   Resp: 18 12 15 11   Temp:  36.8 °C (98.3 °F)     TempSrc:       SpO2: 100% 95% 96% 100%   Weight:       Height:                                                  BP Readings from Last 3 Encounters:   11/04/20 (!) 125/50   11/02/20 139/64   10/14/20 (!) 145/57       NPO Status:                                                                                 BMI:   Wt Readings from Last 3 Encounters:   11/04/20 141 lb 12.1 oz (64.3 kg)   10/13/20 128 lb (58.1 kg)   10/12/20 128 lb (58.1 kg)     Body mass index is 25.11 kg/m².     CBC:   Lab Results   Component Value Date    WBC 7.6 11/04/2020    RBC 2.83 11/04/2020    HGB 7.9 11/04/2020    HCT 24.0 11/04/2020    MCV 84.7 11/04/2020    RDW 17.6 11/04/2020     11/04/2020       CMP:   Lab Results   Component Value Date     11/04/2020    K 4.6 11/04/2020     11/04/2020    CO2 22 11/04/2020    BUN 65 11/04/2020    CREATININE 3.08 11/04/2020    GFRAA 17.9 11/04/2020

## 2020-11-04 NOTE — PROGRESS NOTES
Gastroenterology Progress Note    Piotr Perez is a 70 y.o. female patient. Hospitalization Day:1    Chief C/O: anemia and melena    SUBJECTIVE: Patient was seen and examined in the intensive care unit,  hemodynamically stable, hemoglobin is 7.9 after packed red blood cells, no further melena, has been on Protonix and octreotide infusions. ROS:  Gastrointestinal ROS: no abdominal pain, change in bowel habits, or black or bloody stools    Physical    VITALS:  BP (!) 126/39   Pulse 70   Temp 98.3 °F (36.8 °C)   Resp 12   Ht 5' 3\" (1.6 m)   Wt 141 lb 12.1 oz (64.3 kg)   SpO2 95%   BMI 25.11 kg/m²   TEMPERATURE:  Current - Temp: 98.3 °F (36.8 °C); Max - Temp  Av.5 °F (36.9 °C)  Min: 97.9 °F (36.6 °C)  Max: 99.7 °F (37.6 °C)    General:  Alert and oriented,  No apparent distress  Skin- without jaundice  Eyes: anicteric sclera  Cardiac: RRR, Nl s1s2, without murmurs  Lungs CTA Bilaterally, normal effort  Abdomen soft, ND, NT, no HSM, Bowel sounds normal  Ext: without edema  Neuro: no asterixis     Data    Data Review:    Recent Labs     20  1045 20  1856 20  0110 20  0528   WBC 8.5  --   --  7.6   HGB 4.8* 6.7* 7.9* 7.9*   HCT 15.1* 21.6* 24.2* 24.0*   MCV 77.7*  --   --  84.7     --   --  163     Recent Labs     20  1045      K 5.3*      CO2 26   BUN 63*   CREATININE 3.38*     Recent Labs     20  1045   AST 96*   ALT 44*   BILITOT 0.8*   ALKPHOS 472*     Recent Labs     20  1045   LIPASE 64     Recent Labs     20  1045   PROTIME 14.9   INR 1.2           ASSESSMENT:  62-year-old -American female admitted with melena and symptomatic anemia hemoglobin was 4.8, after packed red blood cells hemoglobin is now 7.9, hemodynamically stable, for EGD today. PLAN :  1.  Continue supportive course of care and ICU treatment  2. Serial H&H, trend and transfuse to keep hemoglobin greater than 7.5  3.  N.p.o. for EGD today  4.   Continue octreotide, ceftriaxone, and PPI infusion    ADDENDUM 1400- S/P EGD notable for, 4 columns of large esophageal varices. Oozing  from a distal esophageal varix noted . Large clot was noted in this stomach obstructing views as well as at the GE junction. Fresh and old blood noted in the stomach. Given the active bleeding, patient was intubated for airway protection. Scope was withdrawn and reintroduced after placing band ligator. A total of 5 bands were deployed with last band placement at 30 cm. 2 additional bands could not  be successfully deployed owing to scarring from previous banding. Variceal collapse proximally noted. Distal mucosa demonstrated scarring and ulceration from previous banding.  -Continue octreotide drip, ok to d/c protonix  -Continue serial H&H and transfuse accordingly  Patient was previously considered for TIPS and transferred to [facility] during prior hospitalization 3 weeks ago,  however patient does have intrahepatic cholangiocarcinoma that may technically preclude TIPS as the lack of window of intervention, if further bleeding will need to consider transfer to [facility] for evaluation for TIPS procedure. Thank you for allowing me to participate in the care of your patient. Please feel free to contact me with any concerns.     eTx Layne, DARY - CNP

## 2020-11-04 NOTE — FLOWSHEET NOTE
Spiritual Care Services     Summary of Visit:   visited with the patient  before rounding,  shared his concerns for his wife and that they have a strong candace, his wife is a Adah Gear, I followed up with the visit after rounding and spoke to the patient and prayed with the patient,  was present during the visit,     Spiritual Assessment/Intervention/Outcomes:    Encounter Summary  Services provided to[de-identified] (P) Patient, Family  Referral/Consult From[de-identified] (P) Rounding  Support System: (P) Spouse, Family members  Continue Visiting: (P) No  Complexity of Encounter: (P) Moderate  Length of Encounter: (P) 30 minutes  Spiritual Assessment Completed: (P) Yes  Routine  Type: (P) Initial     Spiritual/Tenriism  Type: (P) Spiritual support  Assessment: (P) Concerns with suffering, Approachable, Calm  Intervention: (P) Prayer, Nurtured hope, Active listening, Sustaining presence/ Ministry of presence  Outcome: (P) Comfort, Receptive        Advance Directives (For Healthcare)  Pre-existing DNR Comfort Care/DNR Arrest/DNI Order: No  Healthcare Directive: No, patient does not have an advance directive for healthcare treatment  Information on Healthcare Directives Requested: No  Patient Requests Assistance: No           Values / Beliefs  Do you have any ethnic, cultural, sacramental, or spiritual Hoahaoism needs you would like us to be aware of while you are in the hospital?: No    Care Plan:        [id number] [address]   Electronically signed by Mary Beth Correia on 11/4/20 at 10:32 AM EST     To reach a  for emotional and spiritual support, place an [facility] consult request.   If a  is needed immediately, dial 0 and ask to page the on-call .

## 2020-11-04 NOTE — PROGRESS NOTES
1915 handoff preformed   1930 H/H cam back 7.9/34.9 dr Sheng ron served ordered 1 unit of RBC tp be given   2000 assessments preformed as charted patient had no complaints. 2034 transfusion started per hospital guidelines this RN observed Pt for first 15 minutes no adverse reactions noted see flow sheet for more information   0000 assessments preformed as charted patient had no complaints. 0005 blood transfusion finished no adverse reactions   0100 H/H came back post infusion at 0.0/93.7 Dr Sheng ron served no new orders at this time. 0400 assessments preformed as charted patient had no complaints.   Patient care provided as charted   Handoff given to RN at bedside  Electronically signed by Marshal Loo RN on 11/4/2020 at 7:25 AM

## 2020-11-04 NOTE — PROGRESS NOTES
Large blood clot with bleeding noted in stomach. Endoscope removed; patient intubated per anesthesia and procedure resumed.

## 2020-11-05 LAB
ALBUMIN SERPL-MCNC: 1.5 G/DL (ref 3.5–4.6)
ALP BLD-CCNC: 352 U/L (ref 40–130)
ALT SERPL-CCNC: 32 U/L (ref 0–33)
ANION GAP SERPL CALCULATED.3IONS-SCNC: 11 MEQ/L (ref 9–15)
AST SERPL-CCNC: 70 U/L (ref 0–35)
BASE EXCESS ARTERIAL: -2 (ref -3–3)
BASE EXCESS ARTERIAL: -3 (ref -3–3)
BASOPHILS ABSOLUTE: 0.1 K/UL (ref 0–0.2)
BASOPHILS RELATIVE PERCENT: 1.2 %
BILIRUB SERPL-MCNC: 0.9 MG/DL (ref 0.2–0.7)
BUN BLDV-MCNC: 64 MG/DL (ref 8–23)
CALCIUM IONIZED: 1.14 MMOL/L (ref 1.12–1.32)
CALCIUM SERPL-MCNC: 7.6 MG/DL (ref 8.5–9.9)
CHLORIDE BLD-SCNC: 114 MEQ/L (ref 95–107)
CO2: 21 MEQ/L (ref 20–31)
CREAT SERPL-MCNC: 2.73 MG/DL (ref 0.5–0.9)
EOSINOPHILS ABSOLUTE: 0.2 K/UL (ref 0–0.7)
EOSINOPHILS RELATIVE PERCENT: 2.4 %
GFR AFRICAN AMERICAN: 18
GFR AFRICAN AMERICAN: 20.6
GFR NON-AFRICAN AMERICAN: 15
GFR NON-AFRICAN AMERICAN: 17
GLOBULIN: 3.8 G/DL (ref 2.3–3.5)
GLUCOSE BLD-MCNC: 100 MG/DL (ref 60–115)
GLUCOSE BLD-MCNC: 107 MG/DL (ref 60–115)
GLUCOSE BLD-MCNC: 116 MG/DL (ref 60–115)
GLUCOSE BLD-MCNC: 131 MG/DL (ref 70–99)
GLUCOSE BLD-MCNC: 143 MG/DL (ref 60–115)
GLUCOSE BLD-MCNC: 144 MG/DL (ref 60–115)
GLUCOSE BLD-MCNC: 168 MG/DL (ref 60–115)
HCO3 ARTERIAL: 22.1 MMOL/L (ref 21–29)
HCO3 ARTERIAL: 23.1 MMOL/L (ref 21–29)
HCT VFR BLD CALC: 27.5 % (ref 37–47)
HCT VFR BLD CALC: 30.6 % (ref 37–47)
HCT VFR BLD CALC: 31.1 % (ref 37–47)
HCT VFR BLD CALC: 35.1 % (ref 37–47)
HEMOGLOBIN: 10.1 G/DL (ref 12–16)
HEMOGLOBIN: 11 G/DL (ref 12–16)
HEMOGLOBIN: 8.3 GM/DL (ref 12–16)
HEMOGLOBIN: 8.8 G/DL (ref 12–16)
HEMOGLOBIN: 9.9 G/DL (ref 12–16)
INR BLD: 1.2
LACTATE: 1.04 MMOL/L (ref 0.4–2)
LACTATE: 1.11 MMOL/L (ref 0.4–2)
LYMPHOCYTES ABSOLUTE: 1.2 K/UL (ref 1–4.8)
LYMPHOCYTES RELATIVE PERCENT: 15.4 %
MCH RBC QN AUTO: 27.9 PG (ref 27–31.3)
MCHC RBC AUTO-ENTMCNC: 32.1 % (ref 33–37)
MCV RBC AUTO: 86.9 FL (ref 82–100)
MONOCYTES ABSOLUTE: 0.7 K/UL (ref 0.2–0.8)
MONOCYTES RELATIVE PERCENT: 9.5 %
NEUTROPHILS ABSOLUTE: 5.4 K/UL (ref 1.4–6.5)
NEUTROPHILS RELATIVE PERCENT: 71.5 %
O2 SAT, ARTERIAL: 92 % (ref 93–100)
O2 SAT, ARTERIAL: 95 % (ref 93–100)
PCO2 ARTERIAL: 36 MM HG (ref 35–45)
PCO2 ARTERIAL: 38 MM HG (ref 35–45)
PDW BLD-RTO: 17 % (ref 11.5–14.5)
PERFORMED ON: ABNORMAL
PERFORMED ON: NORMAL
PERFORMED ON: NORMAL
PH ARTERIAL: 7.37 (ref 7.35–7.45)
PH ARTERIAL: 7.42 (ref 7.35–7.45)
PLATELET # BLD: 150 K/UL (ref 130–400)
PO2 ARTERIAL: 66 MM HG (ref 75–108)
PO2 ARTERIAL: 75 MM HG (ref 75–108)
POC CHLORIDE: 117 MEQ/L (ref 99–110)
POC CREATININE: 3 MG/DL (ref 0.6–1.2)
POC FIO2: 50
POC FIO2: 50
POC HEMATOCRIT: 24 % (ref 36–48)
POC POTASSIUM: 4 MEQ/L (ref 3.5–5.1)
POC SAMPLE TYPE: ABNORMAL
POC SAMPLE TYPE: ABNORMAL
POC SODIUM: 148 MEQ/L (ref 136–145)
POTASSIUM REFLEX MAGNESIUM: 4.4 MEQ/L (ref 3.4–4.9)
PROTHROMBIN TIME: 15 SEC (ref 12.3–14.9)
RBC # BLD: 3.16 M/UL (ref 4.2–5.4)
SODIUM BLD-SCNC: 146 MEQ/L (ref 135–144)
TCO2 ARTERIAL: 23 (ref 22–29)
TCO2 ARTERIAL: 24 (ref 22–29)
TOTAL PROTEIN: 5.3 G/DL (ref 6.3–8)
WBC # BLD: 7.5 K/UL (ref 4.8–10.8)

## 2020-11-05 PROCEDURE — C9113 INJ PANTOPRAZOLE SODIUM, VIA: HCPCS | Performed by: INTERNAL MEDICINE

## 2020-11-05 PROCEDURE — 80053 COMPREHEN METABOLIC PANEL: CPT

## 2020-11-05 PROCEDURE — 6360000002 HC RX W HCPCS: Performed by: INTERNAL MEDICINE

## 2020-11-05 PROCEDURE — 2580000003 HC RX 258: Performed by: INTERNAL MEDICINE

## 2020-11-05 PROCEDURE — 94003 VENT MGMT INPAT SUBQ DAY: CPT

## 2020-11-05 PROCEDURE — 6360000002 HC RX W HCPCS: Performed by: NURSE PRACTITIONER

## 2020-11-05 PROCEDURE — 99233 SBSQ HOSP IP/OBS HIGH 50: CPT | Performed by: NURSE PRACTITIONER

## 2020-11-05 PROCEDURE — 82803 BLOOD GASES ANY COMBINATION: CPT

## 2020-11-05 PROCEDURE — 85610 PROTHROMBIN TIME: CPT

## 2020-11-05 PROCEDURE — 6370000000 HC RX 637 (ALT 250 FOR IP): Performed by: INTERNAL MEDICINE

## 2020-11-05 PROCEDURE — 85025 COMPLETE CBC W/AUTO DIFF WBC: CPT

## 2020-11-05 PROCEDURE — 84295 ASSAY OF SERUM SODIUM: CPT

## 2020-11-05 PROCEDURE — 2580000003 HC RX 258: Performed by: NURSE PRACTITIONER

## 2020-11-05 PROCEDURE — 85018 HEMOGLOBIN: CPT

## 2020-11-05 PROCEDURE — 82330 ASSAY OF CALCIUM: CPT

## 2020-11-05 PROCEDURE — 82435 ASSAY OF BLOOD CHLORIDE: CPT

## 2020-11-05 PROCEDURE — 82565 ASSAY OF CREATININE: CPT

## 2020-11-05 PROCEDURE — 84132 ASSAY OF SERUM POTASSIUM: CPT

## 2020-11-05 PROCEDURE — 83605 ASSAY OF LACTIC ACID: CPT

## 2020-11-05 PROCEDURE — 99291 CRITICAL CARE FIRST HOUR: CPT | Performed by: INTERNAL MEDICINE

## 2020-11-05 PROCEDURE — 2700000000 HC OXYGEN THERAPY PER DAY

## 2020-11-05 PROCEDURE — 85014 HEMATOCRIT: CPT

## 2020-11-05 PROCEDURE — 2000000000 HC ICU R&B

## 2020-11-05 PROCEDURE — 37799 UNLISTED PX VASCULAR SURGERY: CPT

## 2020-11-05 PROCEDURE — 36415 COLL VENOUS BLD VENIPUNCTURE: CPT

## 2020-11-05 PROCEDURE — 99221 1ST HOSP IP/OBS SF/LOW 40: CPT | Performed by: NURSE PRACTITIONER

## 2020-11-05 RX ORDER — SODIUM CHLORIDE 0.9 % (FLUSH) 0.9 %
10 SYRINGE (ML) INJECTION EVERY 12 HOURS SCHEDULED
Status: DISCONTINUED | OUTPATIENT
Start: 2020-11-05 | End: 2020-11-09 | Stop reason: HOSPADM

## 2020-11-05 RX ORDER — SODIUM CHLORIDE 0.9 % (FLUSH) 0.9 %
10 SYRINGE (ML) INJECTION PRN
Status: DISCONTINUED | OUTPATIENT
Start: 2020-11-05 | End: 2020-11-09 | Stop reason: HOSPADM

## 2020-11-05 RX ORDER — LIDOCAINE HYDROCHLORIDE 20 MG/ML
5 INJECTION, SOLUTION INFILTRATION; PERINEURAL ONCE
Status: DISCONTINUED | OUTPATIENT
Start: 2020-11-05 | End: 2020-11-09 | Stop reason: HOSPADM

## 2020-11-05 RX ORDER — FUROSEMIDE 10 MG/ML
40 INJECTION INTRAMUSCULAR; INTRAVENOUS ONCE
Status: COMPLETED | OUTPATIENT
Start: 2020-11-05 | End: 2020-11-05

## 2020-11-05 RX ORDER — SODIUM CHLORIDE 9 MG/ML
250 INJECTION, SOLUTION INTRAVENOUS ONCE
Status: DISCONTINUED | OUTPATIENT
Start: 2020-11-05 | End: 2020-11-09 | Stop reason: HOSPADM

## 2020-11-05 RX ADMIN — OCTREOTIDE ACETATE 50 MCG/HR: 1000 INJECTION, SOLUTION INTRAVENOUS; SUBCUTANEOUS at 07:37

## 2020-11-05 RX ADMIN — Medication 10 ML: at 08:51

## 2020-11-05 RX ADMIN — PROPOFOL 50 MCG/KG/MIN: 10 INJECTION, EMULSION INTRAVENOUS at 05:53

## 2020-11-05 RX ADMIN — Medication 10 ML: at 08:52

## 2020-11-05 RX ADMIN — FUROSEMIDE 40 MG: 10 INJECTION, SOLUTION INTRAMUSCULAR; INTRAVENOUS at 12:22

## 2020-11-05 RX ADMIN — Medication 10 ML: at 19:29

## 2020-11-05 RX ADMIN — OCTREOTIDE ACETATE 50 MCG/HR: 1000 INJECTION, SOLUTION INTRAVENOUS; SUBCUTANEOUS at 19:26

## 2020-11-05 RX ADMIN — CEFTRIAXONE SODIUM 1 G: 1 INJECTION, POWDER, FOR SOLUTION INTRAMUSCULAR; INTRAVENOUS at 18:45

## 2020-11-05 RX ADMIN — DEXTROSE AND SODIUM CHLORIDE: 5; 450 INJECTION, SOLUTION INTRAVENOUS at 12:22

## 2020-11-05 RX ADMIN — SODIUM CHLORIDE: 9 INJECTION, SOLUTION INTRAVENOUS at 01:25

## 2020-11-05 RX ADMIN — Medication 10 ML: at 19:28

## 2020-11-05 RX ADMIN — CHLORHEXIDINE GLUCONATE 15 ML: 1.2 RINSE ORAL at 08:51

## 2020-11-05 RX ADMIN — Medication 10 ML: at 19:34

## 2020-11-05 RX ADMIN — PANTOPRAZOLE SODIUM 40 MG: 40 INJECTION, POWDER, FOR SOLUTION INTRAVENOUS at 08:51

## 2020-11-05 RX ADMIN — PROPOFOL 50 MCG/KG/MIN: 10 INJECTION, EMULSION INTRAVENOUS at 00:36

## 2020-11-05 RX ADMIN — PANTOPRAZOLE SODIUM 40 MG: 40 INJECTION, POWDER, FOR SOLUTION INTRAVENOUS at 19:37

## 2020-11-05 ASSESSMENT — PAIN SCALES - GENERAL
PAINLEVEL_OUTOF10: 0

## 2020-11-05 ASSESSMENT — PULMONARY FUNCTION TESTS
PIF_VALUE: 21
PIF_VALUE: 21
PIF_VALUE: 22
PIF_VALUE: 21
PIF_VALUE: 20
PIF_VALUE: 22

## 2020-11-05 NOTE — PLAN OF CARE
Nutrition Problem #1: Severe malnutrition, In context of chronic illness  Intervention: Food and/or Nutrient Delivery: Continue NPO(If to remain NPO, recommend TPN, if able to use gut, begin TF)  Nutritional Goals: intiation of nutrition support

## 2020-11-05 NOTE — PROGRESS NOTES
Patient in bed resting with eyes closed. Report at bedside from Eloise Hancock. RASS -4 SEDATION WEANED, but slowly as we are waiting to hear extubation plan from GI. GI CNP in and said to keep sedation on and plan to leave intubated today.  at bedside and updated poor historian for patient. Dr. Mauro Lambert talked to GI and they are okay with extubating. Sedation decreased and RT called to put on CPAP mode. Extubated at [id number], 4L NC on.  at bedside.

## 2020-11-05 NOTE — PROGRESS NOTES
Comprehensive Nutrition Assessment    Type and Reason for Visit:  Initial, Consult(TF order and manage)    Nutrition Recommendations/Plan:   Continue NPO  If TPN indicated, recommend Custom 3:1 TPN (175 g dextrose, 75 g AA, no lipids while on propofol) in 1200 ml, with additives/electrolytes per physician, and d/c peripheral IVF   This will deliver ~ 905 kcals ( + propofol kcals) 75 g protein  If able to begin EN, Low Calorie High Protein ( Vital HP) @ 20 ml/hr, to increase to a goal rate of 42 ml/hr ( 1 liter per day)  Water flush 50 ml, every 4 hrs while on IVF   This will deliver ~ 1000 kcals ( +propofol), 87 g protein, 1036 ml free water  Monitor for changes in propofol rate for adjustments to EN goal rate    Nutrition Assessment:  Severe malnutriton related to liver Ca, nutrition support recommendations provided    Malnutrition Assessment:  Malnutrition Status:  Severe malnutrition    Context:  Chronic Illness     Findings of the 6 clinical characteristics of malnutrition:  Energy Intake:  7 - 75% or less estimated energy requirements for 1 month or longer  Weight Loss:  Unable to assess     Body Fat Loss:  1 - Mild body fat loss Buccal region   Muscle Mass Loss:  7 - Severe muscle mass loss Temples (temporalis)  Fluid Accumulation:  No significant fluid accumulation Ascites   Strength:  Not Performed    Estimated Daily Nutrient Needs:  Energy (kcal):  3497-4548 kcals ( 22-25 kcal/kg UBW); Weight Used for Energy Requirements:  Usual(59 kg)     Protein (g):  ~89 g protein ( 1.5 g/kg UBW) monitor for NH3 levels; Weight Used for Protein Requirements:  Usual(59 kg)        Fluid (ml/day):  ~1475; Method Used for Fluid Requirements:  1 ml/kcal      Nutrition Related Findings:  intubated 11/4, Pt hospitalized much of 10/20, then SNF, GIB, esophageal varices requiring banding, paracentesis.  admitted with recurrent GIB, known, liver Ca, with asites, CKD III, per rounds to start TPN today, unless GI feels EN can start after their evaluation, OG in place, propofol @ 15.4, + fentanyl, IVF = .9 NS @ 125 ml/hr, 2+ BLE edema/+ascites, Labs noted ( BUN = 65, creat =2.73, gluc , Na= 1467, no NH3), visual muscle wasting, Noted to have confusion PTA      Wounds:  None       Current Nutrition Therapies:    Diet NPO Effective Now Exceptions are: Ice Chips    Anthropometric Measures:  · Height: 5' 3\" (160 cm)  · Current Body Weight: 141 lb (64 kg)(+ ascited, 2+ BLE edema)   · Admission Body Weight: 140 lb (63.5 kg)    · Usual Body Weight: 126 lb (57.2 kg)((12/19), 128# ( 10/20))     · Ideal Body Weight: 115 lbs; % Ideal Body Weight   UTD due to fluid status  · BMI: 25  · BMI Categories: Normal Weight (BMI 22.0 to 24.9) age over 65(inaccurate d/t fluid status)       Nutrition Diagnosis:   · Severe malnutrition, In context of chronic illness related to altered GI function, catabolic illness, cognitive or neurological impairment as evidenced by NPO or clear liquid status due to medical condition, intubation, poor intake prior to admission, mild loss of subcutaneous fat, severe muscle loss, GI abnormality      Nutrition Interventions:   Food and/or Nutrient Delivery:  Continue NPO(If to remain NPO, recommend TPN, if able to use gut, begin TF)  Nutrition Education/Counseling:  No recommendation at this time   Coordination of Nutrition Care:  Continue to monitor while inpatient    Goals:  intiation of nutrition support       Nutrition Monitoring and Evaluation:     Food/Nutrient Intake Outcomes:  Enteral Nutrition Intake/Tolerance, Parenteral Nutrition Intake/Tolerance  Physical Signs/Symptoms Outcomes:  Biochemical Data, GI Status, Fluid Status or Edema, Hemodynamic Status, Weight     Discharge Planning:     Too soon to determine     Electronically signed by Eb Hull, SOHAIL, LD on 11/5/20 at 10:17 AM EST

## 2020-11-05 NOTE — PROGRESS NOTES
Hospitalist Progress Note      PCP: Demetri Lieberman MD    Date of Admission: 11/3/2020    Chief Complaint:      Subjective: :  Patient seen and examined at bedside. EGD yesterday showed esophageal varices patient had multiple areas that were banded. There was a large clot, fresh and old blood noted in the stomach. Due to the active bleeding patient remained intubated following the EGD procedure. Medications:  Reviewed     Infusion Medications    sodium chloride      octreotide (SANDOSTATIN) infusion 50 mcg/hr (11/05/20 0737)    dextrose Stopped (11/03/20 1750)    dextrose 5 % and 0.45 % NaCl 75 mL/hr at 11/05/20 1222     Scheduled Medications    lidocaine  5 mL Intradermal Once    sodium chloride flush  10 mL Intravenous 2 times per day    sodium chloride flush  10 mL Intravenous 2 times per day    pantoprazole  40 mg Intravenous BID    And    sodium chloride (PF)  10 mL Intravenous BID    insulin lispro  0-6 Units Subcutaneous Q4H    sodium chloride  20 mL Intravenous Once    sodium chloride flush  10 mL Intravenous 2 times per day    cefTRIAXone (ROCEPHIN) IV  1 g Intravenous Q24H     PRN Meds: sodium chloride flush, sodium chloride flush, sodium chloride flush, acetaminophen **OR** acetaminophen, promethazine **OR** ondansetron, albuterol, glucose, dextrose, glucagon (rDNA), dextrose      Intake/Output Summary (Last 24 hours) at 11/5/2020 1417  Last data filed at 11/5/2020 0600  Gross per 24 hour   Intake 2974 ml   Output --   Net 2974 ml       Exam:    BP (!) 142/68   Pulse (!) 49   Temp 98.4 °F (36.9 °C) (Oral)   Resp 16   Ht 5' 3\" (1.6 m)   Wt 141 lb (64 kg)   SpO2 100%   BMI 24.98 kg/m²     81-year-old -American female. Intubated and sedated  She is frail-appearing.   Normocephalic, atraumatic,   Lungs are clear to auscultation, no wheezing, rhonchi or increased work of breathing  Regular rate and rhythm, no murmur  Abdomen is ascitic, soft, nontender to Diagnosis Date Noted    GIB (gastrointestinal bleeding) [K92.2] 11/03/2020        68-year-old -American female with a past medical history of metastatic cholangiocarcinoma, cirrhosis with complication of esophageal varices presenting with GI bleed     GIB/ Acute blood loss anemia 2/2 to Variceal bleeding: Hemoglobin is 4.8 on presentation. She has a history variceal bleeding. S/p transfusion with improvement. Hemodynamically stable  - EGD with banding of multiple varices and new and old blood in the stomach. Pt remains intubated following the procedure.    - Hold ASA  - NPO  -Protonix, Octreotide drip, IV rocephin  -Gastroenterology following     Cholangiocarcinoma, with liver mets     T2DM and Hypoglycemia: D5 PRN  - SS, hypoglycemia protocol     IAIN on CKD stage III: Suspect prerenal d/t low volume status  -Trend BMP  -Strict I's and O's monitor urine output     Hypertension:  - Home medications held          Diet: Diet NPO Effective Now Exceptions are: Ice Chips    Code Status: Full Code    PT/OT Eval           Electronically signed by Alcira Hopper DO on 11/5/2020 at 2:17 PM

## 2020-11-05 NOTE — PROGRESS NOTES
Pulmonary & Critical Care Medicine ICU Progress Note  Chief complaint : resp failure     Subjunctive/24 hour events :   Patient seen and examined during multidisciplinary rounds with RN, charge nurse, RT, pharmacy, dietitian, and social service.    No fever , smear BM,  , does not have a palm, she is on vent, no issues of hematemesis or bleed at night, no vomiting,      Social History     Tobacco Use    Smoking status: Never Smoker    Smokeless tobacco: Never Used   Substance Use Topics    Alcohol use: No         Problem Relation Age of Onset    Cancer Mother         stomach    Cancer Father         throat    Arthritis Neg Hx     Asthma Neg Hx     Birth Defects Neg Hx     Depression Neg Hx     Diabetes Neg Hx     Early Death Neg Hx     Hearing Loss Neg Hx     Heart Disease Neg Hx     High Blood Pressure Neg Hx     High Cholesterol Neg Hx     Kidney Disease Neg Hx     Learning Disabilities Neg Hx     Mental Illness Neg Hx     Mental Retardation Neg Hx     Miscarriages / Stillbirths Neg Hx     Stroke Neg Hx     Substance Abuse Neg Hx     Vision Loss Neg Hx     Other Neg Hx        Recent Labs     11/04/20  1738 11/05/20  0955   PHART 7.411 7.415   MQR7IYR 38 36   PO2ART 149* 75*       MV Settings:  Vent Mode: AC/VC Rate Set: 16 bmp/Vt Ordered: 350 mL/ /FiO2 : 50 %           IV:   sodium chloride      sodium chloride 125 mL/hr at 11/05/20 0125    propofol 40 mcg/kg/min (11/05/20 0812)    fentaNYL (SUBLIMAZE) infusion 25 mcg/hr (11/04/20 1833)    octreotide (SANDOSTATIN) infusion 50 mcg/hr (11/05/20 0737)    dextrose Stopped (11/03/20 1750)    dextrose 5 % and 0.45 % NaCl Stopped (11/04/20 1421)       Vitals:  BP (!) 118/49   Pulse (!) 48   Temp 98.4 °F (36.9 °C) (Oral)   Resp 16   Ht 5' 3\" (1.6 m)   Wt 141 lb (64 kg)   SpO2 100%   BMI 24.98 kg/m²    Tmax:        Intake/Output Summary (Last 24 hours) at 11/5/2020 1125  Last data filed at 11/5/2020 0600  Gross per 24 hour Intake 3774 ml   Output 400 ml   Net 3374 ml       EXAM:  General: On vent, unresponsive  Head: normocephalic, atraumatic  Eyes:No gross abnormalities. ENT:  MMM no lesions, ET   tube in  Neck:  supple and no masses  Chest : clear to auscultation bilaterally- no wheezes, rales or rhonchi, normal air movement, no respiratory distress  Heart[de-identified] Heart sounds are normal.  Regular rate and rhythm without murmur, gallop or rub. ABD:  symmetric, soft, non-tender, no guarding or rebound, slightly distended  Musculoskeletal : no cyanosis, no clubbing and no edema  Neuro:  Sedated on vent  Skin: No rashes or nodules noted. Lymph node:  no cervical nodes  Urology: No Hanks   Psychiatric: Calm    Medications:  Scheduled Meds:   lidocaine  5 mL Intradermal Once    sodium chloride flush  10 mL Intravenous 2 times per day    sodium chloride flush  10 mL Intravenous 2 times per day    pantoprazole  40 mg Intravenous BID    And    sodium chloride (PF)  10 mL Intravenous BID    chlorhexidine  15 mL Mouth/Throat BID    famotidine (PEPCID) injection  20 mg Intravenous Daily    insulin lispro  0-6 Units Subcutaneous Q4H    sodium chloride  20 mL Intravenous Once    sodium chloride flush  10 mL Intravenous 2 times per day    cefTRIAXone (ROCEPHIN) IV  1 g Intravenous Q24H       PRN Meds:  sodium chloride flush, sodium chloride flush, sodium chloride flush, acetaminophen **OR** acetaminophen, promethazine **OR** ondansetron, albuterol, glucose, dextrose, glucagon (rDNA), dextrose    Results: reviewed by me   CBC:   Recent Labs     11/03/20  1045  11/04/20  0528  11/04/20  1627  11/04/20  2331 11/05/20  0511 11/05/20  0955   WBC 8.5  --  7.6  --   --   --   --  7.5  --    HGB 4.8*   < > 7.9*   < > 9.9*   < > 8.4* 8.8* 8.3*   HCT 15.1*   < > 24.0*   < > 29.4*  --  25.4* 27.5*  --    MCV 77.7*  --  84.7  --   --   --   --  86.9  --      --  163  --   --   --   --  150  --     < > = values in this interval not displayed. BMP:   Recent Labs     11/03/20  1045 11/04/20  0906  11/04/20  1738 11/05/20  0511 11/05/20  0955    146*  --   --  146*  --    K 5.3* 4.6  --   --  4.4  --     113*  --   --  114*  --    CO2 26 22  --   --  21  --    BUN 63* 65*  --   --  64*  --    CREATININE 3.38* 3.08*   < > 3.0* 2.73* 3.0*    < > = values in this interval not displayed. LIVER PROFILE:   Recent Labs     11/03/20  1045 11/04/20  0906 11/05/20  0511   AST 96* 99* 70*   ALT 44* 41* 32   LIPASE 64  --   --    BILITOT 0.8* 1.2* 0.9*   ALKPHOS 472* 403* 352*     PT/INR:   Recent Labs     11/03/20  1045 11/04/20  1627 11/05/20  0511   PROTIME 14.9 14.6 15.0*   INR 1.2 1.1 1.2     APTT:   Recent Labs     11/03/20  1045   APTT 32.8     UA:No results for input(s): NITRITE, COLORU, PHUR, LABCAST, WBCUA, RBCUA, MUCUS, TRICHOMONAS, YEAST, BACTERIA, CLARITYU, SPECGRAV, LEUKOCYTESUR, UROBILINOGEN, BILIRUBINUR, BLOODU, GLUCOSEU, AMORPHOUS in the last 72 hours. Invalid input(s): [place]    Cultures:  Negative so far  Films:  CXR reviewed by me and it showed congestion with left-sided effusion      Assessment:   This is a critically ill patient at risk of deterioration / death , needing close ICU monitoring and intervention due to below noted problems     · Respiratory insufficiency secondary to upper GI bleed  · Acute blood loss anemia secondary to upper GI bleed due to brisk variceal bleed  · Pleural effusion secondary to volume overload  · Cholangiocarcinoma  · Acute on chronic renal failure secondary to above      Recommendation  · Vent support lung protective strategy  · head of the bed 30°  · Wean off sedation and breathing trial today  · Watch for ICU delirium: TV on, natural light, avoid benzos, pain control, early mobility, and family engagement  · PUD prophylaxis  · DVT prophylaxis  · Discussed with GI, no further plans, okay to extubate if patient able to pass breathing trial  · We will give Lasix 40 mg IV x1  · Monitor urine output might need Hanks catheter  · Bladder scan  · Blood sugar target 140-180  · N.p.o. for now, discussed with GI okay to start clear liquid tomorrow  · Cancel PICC line since we can to be starting oral diet tomorrow        Due to the immediate potential for life-threatening deterioration due to respiratory insufficiency and acute blood loss anemia I spent 39  minutes providing critical care.  This time is excluding time spent performing procedures.           Electronically signed by Wesley Pedraza MD,  FCCP ,on 11/5/2020 at 11:25 AM

## 2020-11-05 NOTE — PROGRESS NOTES
Gastroenterology Progress Note    Marco Benitez is a 70 y.o. female patient. Hospitalization Day:2    Chief C/O: Esophageal varices, anemia and melena    SUBJECTIVE: Seen and examined in the intensive care unit, intubated and sedated post EGD  notable for, 4 columns of large esophageal varices. Oozing from a distal esophageal varix noted . Large clot was noted in this stomach obstructing views as well as at the GE junction. Fresh and old blood noted in the stomach, A total of 5 bands were deployed with last band placement at 30 cm. 2 additional bands could not  be successfully deployed owing to scarring from previous banding. Variceal collapse proximally noted. Care discussed with nursing, patient has had no further melena overnight has been maintained on octreotide infusion. ROS:  Gastrointestinal ROS: no abdominal pain, change in bowel habits, or black or bloody stools    Physical    VITALS:  BP (!) 118/49   Pulse (!) 48   Temp 98.4 °F (36.9 °C) (Oral)   Resp 16   Ht 5' 3\" (1.6 m)   Wt 141 lb (64 kg)   SpO2 100%   BMI 24.98 kg/m²   TEMPERATURE:  Current - Temp: 98.4 °F (36.9 °C); Max - Temp  Av.1 °F (36.7 °C)  Min: 97.6 °F (36.4 °C)  Max: 99.3 °F (37.4 °C)    General:  Alert and oriented,  No apparent distress  Skin- without jaundice  Eyes: anicteric sclera  Cardiac: RRR, Nl s1s2, without murmurs  Lungs CTA Bilaterally, normal effort  Abdomen soft, ND, NT, no HSM, Bowel sounds normal  Ext: without edema  Neuro: no asterixis     Data    Data Review:    Recent Labs     20  1045  20  0528  20  1627 20  1738 20  2331 20  0511   WBC 8.5  --  7.6  --   --   --   --  7.5   HGB 4.8*   < > 7.9*   < > 9.9* 8.5* 8.4* 8.8*   HCT 15.1*   < > 24.0*   < > 29.4*  --  25.4* 27.5*   MCV 77.7*  --  84.7  --   --   --   --  86.9     --  163  --   --   --   --  150    < > = values in this interval not displayed.      Recent Labs     20  1045 20  0906 11/04/20  1412 11/04/20  1738 11/05/20  0511    146*  --   --  146*   K 5.3* 4.6  --   --  4.4    113*  --   --  114*   CO2 26 22  --   --  21   BUN 63* 65*  --   --  64*   CREATININE 3.38* 3.08* 2.8* 3.0* 2.73*     Recent Labs     11/03/20  1045 11/04/20  0906 11/05/20  0511   AST 96* 99* 70*   ALT 44* 41* 32   BILITOT 0.8* 1.2* 0.9*   ALKPHOS 472* 403* 352*     Recent Labs     11/03/20  1045   LIPASE 64     Recent Labs     11/03/20  1045 11/04/20  1627 11/05/20  0511   PROTIME 14.9 14.6 15.0*   INR 1.2 1.1 1.2           ASSESSMENT:  82-year-old female admitted with GI bleeding, hemoglobin was 4.8, post EGD with EVL for large oozing varices, patient required intubation, remains intubated and sedated on octreotide, is hemodynamically stable    PLAN :  1.  Continue supportive course of care and ICU treatment, ok to wean to extubate from a GI perspective  2. Serial H&H trend and transfuse to keep hemoglobin greater than 7.5  3. Continue octreotide and ceftriaxone. Thank you for allowing me to participate in the care of your patient. Please feel free to contact me with any concerns.     DARY Contreras - CNP

## 2020-11-05 NOTE — CONSULTS
Palliative Care Consult Note  Patient: Evangelina Grigsby  Gender: female  YOB: 1945  Unit/Bed: IC03/IC03-01  CodeStatus: Full Code  Inpatient Treatment Team: Treatment Team: Attending Provider: Shelley Naidu DO; Consulting Physician: Papito Ortiz MD; Utilization Reviewer: Lydia Tapia RN; Surgeon: Papito Ortiz MD; Respiratory Therapist (Day): Danna Fleming RCP; : Chau Mackenzie; Registered Nurse: Shanti Marie RN; : Davis Sue  Admit Date:  11/3/2020    Chief Complaint: intubated    History of Presenting Illness:      Evangelina Grigsby is a 70 y.o. 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 Patient presented to the E.D on 11/3 with complaints of black tarry stool x 1 day. She has recently had a rubber band ligation done at [facility]. Patient was hospitalized multiple times in the last month for various reasons including Gi bleed. She was recently discharged 10/14 after evaluation for Gi bleed. She underwent EGD that showed large esophageal varices with a large clot in the stomach. Upon presentation to the E. D.patient Hgb was 4. 8. Patient was consulted by Gi and was started on Protonix drip and atb. Patient is currently intubated. She is NPO. Consulted dietitian. Plan is to start on clear liquid diet tomorrow as per GI. No fever, emesis or any melena.  is at bedside. Palliative care discussed goals of care with two daughters. Both daughters confirmed that the oldest daughter Rehana(lives in [place]) - is the primary decision maker, Howie Magaña -patient's brother-second and Deandre-youngest daughter  the third. Advised daughters to provide HCPOA papers to be able to scan in the system. Discussed in detail with Josee Saúl goals of care and currently the plan is to continue aggressive treatment. Patient to remain Full Code.  Palliative care to follow sodium chloride flush 0.9 % injection 10 mL  10 mL Intravenous PRN Valentin Mahajan MD        0.9 % sodium chloride infusion  250 mL Intravenous Once Valentin Mahajan MD        sodium chloride flush 0.9 % injection 10 mL  10 mL Intravenous 2 times per day Rhonda Haque MD   10 mL at 11/05/20 0852    sodium chloride flush 0.9 % injection 10 mL  10 mL Intravenous PRN Rhonda Haque MD        propofol injection  10 mcg/kg/min Intravenous Titrated Valentin Mahajan MD   Stopped at 11/05/20 1207    pantoprazole (PROTONIX) injection 40 mg  40 mg Intravenous BID Hich[facility], MD   40 mg at 11/05/20 0851    And    sodium chloride (PF) 0.9 % injection 10 mL  10 mL Intravenous BID Rhonda Haque MD   10 mL at 11/05/20 0851    chlorhexidine (PERIDEX) 0.12 % solution 15 mL  15 mL Mouth/Throat BID Valentin Mahajan MD   15 mL at 11/05/20 0851    fentaNYL (SUBLIMAZE) 1,000 mcg in sodium chloride 0.9 % 100 mL infusion  25 mcg/hr Intravenous Continuous Valentin Mahajan MD   Stopped at 11/05/20 1140    insulin lispro (HUMALOG) injection vial 0-6 Units  0-6 Units Subcutaneous Q4H Quynh Rodriguez DO   Stopped at 11/05/20 0040    0.9 % sodium chloride bolus  20 mL Intravenous Once Quynh Rodriguez DO        sodium chloride flush 0.9 % injection 10 mL  10 mL Intravenous 2 times per day Quynh Rodriguez DO   10 mL at 11/04/20 2110    sodium chloride flush 0.9 % injection 10 mL  10 mL Intravenous PRN Quynh Rodriguez DO        acetaminophen (TYLENOL) tablet 650 mg  650 mg Oral Q6H PRN Quynh Rodriguez DO        Or    acetaminophen (TYLENOL) suppository 650 mg  650 mg Rectal Q6H PRN Quynh Rodriguez DO        promethazine (PHENERGAN) tablet 12.5 mg  12.5 mg Oral Q6H PRN Quynh Rodriguez DO        Or    ondansetron (ZOFRAN) injection 4 mg  4 mg Intravenous Q6H PRN Quynh Rodriguez DO        octreotide (SANDOSTATIN) 500 mcg in sodium chloride 0.9 % 100 mL infusion  50 mcg/hr Intravenous Continuous DARY Perez CNP 10 mL/hr at 11/05/20 0737 50 mcg/hr at 11/05/20 0737    albuterol (PROVENTIL) nebulizer solution 2.5 mg  2.5 mg Nebulization Q4H PRN [place] Addington, DO        cefTRIAXone (ROCEPHIN) 1 g IVPB in 50 mL D5W minibag  1 g Intravenous Q24H Jessie Pool APRN - CNP   Stopped at 11/04/20 1827    glucose (GLUTOSE) 40 % oral gel 15 g  15 g Oral PRN [place] Addington, DO        dextrose 50 % IV solution  12.5 g Intravenous PRN [place] Addington, DO   12.5 g at 11/03/20 1817    glucagon (rDNA) injection 1 mg  1 mg Intramuscular PRN [place] Addington, DO        dextrose 5 % solution  100 mL/hr Intravenous PRN [place] Addington, DO   Stopped at 11/03/20 1750    dextrose 5 % and 0.45 % sodium chloride infusion   Intravenous Continuous [place] Addington, DO 75 mL/hr at 11/05/20 1222         History: PMHx:  Past Medical History:   Diagnosis Date    Ascites     [facility])     liver    CKD (chronic kidney disease)     Depression     [facility]     History of blood transfusion     Hydronephrosis     Hypertension     Pneumonia     Upper GI bleed        PSHx:  Past Surgical History:   Procedure Laterality Date    PARACENTESIS Left 10/06/2020    3330 cc by Dr. Norm Haskins.     UPPER GASTROINTESTINAL ENDOSCOPY N/A 10/13/2020    EGD ESOPHAGOGASTRODUODENOSCOPY WITH BANDING to esophageal varices sites performed by Vangie Lopez MD at [address] [place] [address] N/A 11/4/2020    EGD ESOPHAGOGASTRODUODENOSCOPY performed by Vangie Lopez MD at [facility] Hx:  Social History     Socioeconomic History    Marital status:      Spouse name: None    Number of children: None    Years of education: None    Highest education level: None   Occupational History    None   Social Needs    Financial resource strain: None    Food insecurity     Worry: None     Inability: None    Transportation needs     Medical: None     Non-medical: None   Tobacco Use    Smoking status: Never Smoker    Smokeless No tobacco: Never Used   Substance and Sexual Activity    Alcohol use: No    Drug use: No    Sexual activity: Yes     Partners: Male   Lifestyle    Physical activity     Days per week: None     Minutes per session: None    Stress: None   Relationships    Social connections     Talks on phone: None     Gets together: None     Attends [employer] service: None     Active member of club or organization: None     Attends meetings of clubs or organizations: None     Relationship status: None    Intimate partner violence     Fear of current or ex partner: None     Emotionally abused: None     Physically abused: None     Forced sexual activity: None   Other Topics Concern    None   Social History Narrative    None       Family Hx:  Family History   Problem Relation Age of Onset    Cancer Mother         stomach    Cancer Father         throat    Arthritis Neg Hx     Asthma Neg Hx     Birth Defects Neg Hx     Depression Neg Hx     Diabetes Neg Hx     Early Death Neg Hx     Hearing Loss Neg Hx     Heart Disease Neg Hx     High Blood Pressure Neg Hx     High Cholesterol Neg Hx     Kidney Disease Neg Hx     Learning Disabilities Neg Hx     Mental Illness Neg Hx     Mental Retardation Neg Hx     Miscarriages / Stillbirths Neg Hx     Stroke Neg Hx     Substance Abuse Neg Hx     Vision Loss Neg Hx     Other Neg Hx        LABS: Reviewed     CBC:  Lab Results   Component Value Date    WBC 7.5 11/05/2020    RBC 3.16 11/05/2020    HGB 9.9 11/05/2020    HCT 30.6 11/05/2020    MCV 86.9 11/05/2020    MCH 27.9 11/05/2020    MCHC 32.1 11/05/2020    RDW 17.0 11/05/2020     11/05/2020     CBC with Differential:   Lab Results   Component Value Date    WBC 7.5 11/05/2020    RBC 3.16 11/05/2020    HGB 9.9 11/05/2020    HCT 30.6 11/05/2020     11/05/2020    MCV 86.9 11/05/2020    MCH 27.9 11/05/2020    MCHC 32.1 11/05/2020    RDW 17.0 11/05/2020    NRBC 1 10/07/2020    BANDSPCT 1 09/24/2020    LYMPHOPCT 15.4 11/05/2020    MONOPCT 9.5 11/05/2020    BASOPCT 1.2 11/05/2020    MONOSABS 0.7 11/05/2020    LYMPHSABS 1.2 11/05/2020    EOSABS 0.2 11/05/2020    BASOSABS 0.1 11/05/2020     CMP:    Lab Results   Component Value Date     11/05/2020    K 4.4 11/05/2020     11/05/2020    CO2 21 11/05/2020    BUN 64 11/05/2020    CREATININE 3.0 11/05/2020    CREATININE 2.73 11/05/2020    GFRAA 18 11/05/2020    LABGLOM 15 11/05/2020    GLUCOSE 131 11/05/2020    GLUCOSE 576 07/13/2020    PROT 5.3 11/05/2020    LABALBU 1.5 11/05/2020    LABALBU 2.7 07/13/2020    CALCIUM 7.6 11/05/2020    BILITOT 0.9 11/05/2020    ALKPHOS 352 11/05/2020    AST 70 11/05/2020    ALT 32 11/05/2020     BMP:    Lab Results   Component Value Date     11/05/2020    K 4.4 11/05/2020     11/05/2020    CO2 21 11/05/2020    BUN 64 11/05/2020    LABALBU 1.5 11/05/2020    LABALBU 2.7 07/13/2020    CREATININE 3.0 11/05/2020    CREATININE 2.73 11/05/2020    CALCIUM 7.6 11/05/2020    GFRAA 18 11/05/2020    LABGLOM 15 11/05/2020    GLUCOSE 131 11/05/2020    GLUCOSE 576 07/13/2020     TSH: No results found for: TSH  Vitamin B12 and Folate: No components found for: FOLIC,  N31  Urinalysis:   Lab Results   Component Value Date    NITRU Negative 10/04/2020    WBCUA 0-2 10/04/2020    WBCUA 2 07/13/2020    BACTERIA Negative 10/04/2020    RBCUA 20-50 10/04/2020    RBCUA 9 07/13/2020    BLOODU MODERATE 10/04/2020    SPECGRAV 1.012 10/04/2020    GLUCOSEU Negative 10/04/2020           FUNCTIONAL ADL´S:     Independent: [  ] Eating, [   ] Dressing, [   ] Transferring, [   ] Tura Camp, [   ] Mckayla Crest, [   ] Continence  Dependent   : [ x ] Eating, [ x ] Dressing, [ x  ] Transferring, [ x  ] Toileting, [ x  ] Bathing, [  x ] Continence  W. assistant : [  ] Eating, [   ] Dressing, [   ] Transferring, [   ] Tura Camp, [   ] Mckayla Escobedo, [   ] Continence    Radiology: Reviewed      Xr Chest Portable    Result Date: 11/4/2020  Exam: XR CHEST PORTABLE History: intubation Technique: AP portable view of the chest obtained. Comparison: Chest x-ray from October 13, 2020 Findings: The patient is intubated with the tip of the endotracheal tube and the right mainstem bronchus. .  There is no pneumothorax. The cardiomediastinal silhouette is within normal limits. There are mild increased pulmonary markings in the right lung base. There is opacity in the left inferior hemithorax representing effusion with underlying atelectasis or infiltrate. Bones of the thorax appear intact. Status post intubation with the tip of the endotracheal tube in the right mainstem bronchus. Recommend retracting 2 to 3 cm. There is a left pleural effusion with underlying atelectasis versus infiltrate and mild atelectasis versus infiltrate in the right lung base. Assessment and plan:  1. Acute Gi bleed secondary to esophageal varices  -consulted GI  -NPO  -protonix drip,ocreotide drip and atb-ceftriaxone as per Gi recommendation  -received blood transfusion  -EGD on 11/4-4 columns of large esophageal varices, large clot in stomach with active bleeding  -keep Hgb >7.5  2. Cholangiocarcinoma with liver mets  -active with oncology at [facility]  3. IAIN on CKDIII  -monitor renal function and urine output   -strict I&O  -avoid nephrotoxic medication  4. RLL infiltrate  -CXR today  5. Palliative care Encounter  -discussed goals of care with daughter Brynn Miner  -currently the plan to seek aggressive treatment  -continue with Full Code  -in agreement to be followed by palliative care as outpatient for symptom management  -follow up with palliative care tomorrow  -plan is to go home with Hossein Saha  Discussed goals of care with patient. Explained in extensive detail nuances between full code, DNR CCA and DNR CC. Primary decision maker has made the decision to be: Full Code      -Goals of Care Discussion:  Disease process and goals of treatment were discussed in basic terms.  Andre Murdock We discussed the palliative care philosophy in light of those goals. We discussed all care options contingent on treatment response and QOL. Much active listening, presence, and emotional support were given.          Electronically signed by DARY Renee NP on 11/5/2020 at 1:06 PM

## 2020-11-05 NOTE — FLOWSHEET NOTE
1900- Hand off report salma Glover    Assessment complete. Pt resting. 0000- assessment complete. bathed complete linen change. 0400- assessment complete    Pt turned Q 2 hrs through night.

## 2020-11-06 ENCOUNTER — APPOINTMENT (OUTPATIENT)
Dept: GENERAL RADIOLOGY | Age: 75
DRG: 432 | End: 2020-11-06
Payer: MEDICARE

## 2020-11-06 LAB
ALBUMIN SERPL-MCNC: 1.6 G/DL (ref 3.5–4.6)
ALP BLD-CCNC: 324 U/L (ref 40–130)
ALT SERPL-CCNC: 29 U/L (ref 0–33)
ANION GAP SERPL CALCULATED.3IONS-SCNC: 10 MEQ/L (ref 9–15)
AST SERPL-CCNC: 62 U/L (ref 0–35)
BACTERIA: NEGATIVE /HPF
BASOPHILS ABSOLUTE: 0.1 K/UL (ref 0–0.2)
BASOPHILS RELATIVE PERCENT: 1 %
BILIRUB SERPL-MCNC: 0.8 MG/DL (ref 0.2–0.7)
BILIRUBIN URINE: NEGATIVE
BLOOD, URINE: NEGATIVE
BUN BLDV-MCNC: 66 MG/DL (ref 8–23)
CALCIUM SERPL-MCNC: 7.2 MG/DL (ref 8.5–9.9)
CHLORIDE BLD-SCNC: 115 MEQ/L (ref 95–107)
CLARITY: CLEAR
CO2: 21 MEQ/L (ref 20–31)
COLOR: YELLOW
CREAT SERPL-MCNC: 3.01 MG/DL (ref 0.5–0.9)
EOSINOPHILS ABSOLUTE: 0.1 K/UL (ref 0–0.7)
EOSINOPHILS RELATIVE PERCENT: 1.6 %
EPITHELIAL CELLS, UA: ABNORMAL /HPF (ref 0–5)
GFR AFRICAN AMERICAN: 18.4
GFR NON-AFRICAN AMERICAN: 15.2
GLOBULIN: 4 G/DL (ref 2.3–3.5)
GLUCOSE BLD-MCNC: 130 MG/DL (ref 60–115)
GLUCOSE BLD-MCNC: 136 MG/DL (ref 60–115)
GLUCOSE BLD-MCNC: 150 MG/DL (ref 70–99)
GLUCOSE BLD-MCNC: 153 MG/DL (ref 60–115)
GLUCOSE BLD-MCNC: 156 MG/DL (ref 60–115)
GLUCOSE BLD-MCNC: 260 MG/DL (ref 60–115)
GLUCOSE BLD-MCNC: 332 MG/DL (ref 60–115)
GLUCOSE URINE: NEGATIVE MG/DL
HCT VFR BLD CALC: 27.4 % (ref 37–47)
HCT VFR BLD CALC: 28.9 % (ref 37–47)
HCT VFR BLD CALC: 29.8 % (ref 37–47)
HCT VFR BLD CALC: 31.1 % (ref 37–47)
HEMOGLOBIN: 8.9 G/DL (ref 12–16)
HEMOGLOBIN: 9.5 G/DL (ref 12–16)
HEMOGLOBIN: 9.6 G/DL (ref 12–16)
HEMOGLOBIN: 9.7 G/DL (ref 12–16)
HYALINE CASTS: ABNORMAL /HPF (ref 0–5)
INR BLD: 1.1
KETONES, URINE: NEGATIVE MG/DL
LEUKOCYTE ESTERASE, URINE: ABNORMAL
LYMPHOCYTES ABSOLUTE: 1.4 K/UL (ref 1–4.8)
LYMPHOCYTES RELATIVE PERCENT: 19.3 %
MCH RBC QN AUTO: 28.4 PG (ref 27–31.3)
MCHC RBC AUTO-ENTMCNC: 32.7 % (ref 33–37)
MCV RBC AUTO: 87.1 FL (ref 82–100)
MONOCYTES ABSOLUTE: 0.6 K/UL (ref 0.2–0.8)
MONOCYTES RELATIVE PERCENT: 8.5 %
NEUTROPHILS ABSOLUTE: 4.9 K/UL (ref 1.4–6.5)
NEUTROPHILS RELATIVE PERCENT: 69.6 %
NITRITE, URINE: NEGATIVE
PDW BLD-RTO: 17.7 % (ref 11.5–14.5)
PERFORMED ON: ABNORMAL
PH UA: 5 (ref 5–9)
PLATELET # BLD: 159 K/UL (ref 130–400)
POTASSIUM REFLEX MAGNESIUM: 3.9 MEQ/L (ref 3.4–4.9)
PROTEIN UA: 30 MG/DL
PROTHROMBIN TIME: 14.5 SEC (ref 12.3–14.9)
RBC # BLD: 3.14 M/UL (ref 4.2–5.4)
RBC UA: ABNORMAL /HPF (ref 0–5)
SODIUM BLD-SCNC: 146 MEQ/L (ref 135–144)
SPECIFIC GRAVITY UA: 1.01 (ref 1–1.03)
TOTAL PROTEIN: 5.6 G/DL (ref 6.3–8)
URINE REFLEX TO CULTURE: YES
UROBILINOGEN, URINE: 0.2 E.U./DL
WBC # BLD: 7 K/UL (ref 4.8–10.8)
WBC UA: >100 /HPF (ref 0–5)
YEAST: PRESENT /HPF

## 2020-11-06 PROCEDURE — C9113 INJ PANTOPRAZOLE SODIUM, VIA: HCPCS | Performed by: INTERNAL MEDICINE

## 2020-11-06 PROCEDURE — 2580000003 HC RX 258: Performed by: INTERNAL MEDICINE

## 2020-11-06 PROCEDURE — 85014 HEMATOCRIT: CPT

## 2020-11-06 PROCEDURE — 85610 PROTHROMBIN TIME: CPT

## 2020-11-06 PROCEDURE — 81001 URINALYSIS AUTO W/SCOPE: CPT

## 2020-11-06 PROCEDURE — 6360000002 HC RX W HCPCS: Performed by: INTERNAL MEDICINE

## 2020-11-06 PROCEDURE — 99232 SBSQ HOSP IP/OBS MODERATE 35: CPT | Performed by: NURSE PRACTITIONER

## 2020-11-06 PROCEDURE — 99233 SBSQ HOSP IP/OBS HIGH 50: CPT | Performed by: INTERNAL MEDICINE

## 2020-11-06 PROCEDURE — 6360000002 HC RX W HCPCS: Performed by: NURSE PRACTITIONER

## 2020-11-06 PROCEDURE — 71045 X-RAY EXAM CHEST 1 VIEW: CPT

## 2020-11-06 PROCEDURE — 85018 HEMOGLOBIN: CPT

## 2020-11-06 PROCEDURE — 2580000003 HC RX 258: Performed by: NURSE PRACTITIONER

## 2020-11-06 PROCEDURE — 1210000000 HC MED SURG R&B

## 2020-11-06 PROCEDURE — 80053 COMPREHEN METABOLIC PANEL: CPT

## 2020-11-06 PROCEDURE — 94664 DEMO&/EVAL PT USE INHALER: CPT

## 2020-11-06 PROCEDURE — 36415 COLL VENOUS BLD VENIPUNCTURE: CPT

## 2020-11-06 PROCEDURE — 85025 COMPLETE CBC W/AUTO DIFF WBC: CPT

## 2020-11-06 RX ORDER — FUROSEMIDE 10 MG/ML
40 INJECTION INTRAMUSCULAR; INTRAVENOUS ONCE
Status: COMPLETED | OUTPATIENT
Start: 2020-11-06 | End: 2020-11-06

## 2020-11-06 RX ORDER — TORSEMIDE 20 MG/1
40 TABLET ORAL DAILY
Status: DISCONTINUED | OUTPATIENT
Start: 2020-11-07 | End: 2020-11-09 | Stop reason: HOSPADM

## 2020-11-06 RX ADMIN — Medication 10 ML: at 20:26

## 2020-11-06 RX ADMIN — Medication 10 ML: at 07:48

## 2020-11-06 RX ADMIN — CEFTRIAXONE SODIUM 1 G: 1 INJECTION, POWDER, FOR SOLUTION INTRAMUSCULAR; INTRAVENOUS at 15:03

## 2020-11-06 RX ADMIN — Medication 10 ML: at 07:47

## 2020-11-06 RX ADMIN — OCTREOTIDE ACETATE 50 MCG/HR: 1000 INJECTION, SOLUTION INTRAVENOUS; SUBCUTANEOUS at 05:54

## 2020-11-06 RX ADMIN — Medication 10 ML: at 07:50

## 2020-11-06 RX ADMIN — FUROSEMIDE 40 MG: 10 INJECTION, SOLUTION INTRAMUSCULAR; INTRAVENOUS at 09:48

## 2020-11-06 RX ADMIN — PANTOPRAZOLE SODIUM 40 MG: 40 INJECTION, POWDER, FOR SOLUTION INTRAVENOUS at 07:49

## 2020-11-06 RX ADMIN — DEXTROSE AND SODIUM CHLORIDE: 5; 450 INJECTION, SOLUTION INTRAVENOUS at 01:22

## 2020-11-06 RX ADMIN — PANTOPRAZOLE SODIUM 40 MG: 40 INJECTION, POWDER, FOR SOLUTION INTRAVENOUS at 20:26

## 2020-11-06 ASSESSMENT — ENCOUNTER SYMPTOMS
SHORTNESS OF BREATH: 0
CHEST TIGHTNESS: 0
WHEEZING: 0
SINUS PAIN: 0
ABDOMINAL PAIN: 0
TROUBLE SWALLOWING: 0
NAUSEA: 0
COUGH: 0
BACK PAIN: 0
CONSTIPATION: 0
SORE THROAT: 0
VOMITING: 0
DIARRHEA: 0

## 2020-11-06 ASSESSMENT — PAIN SCALES - GENERAL
PAINLEVEL_OUTOF10: 0
PAINLEVEL_OUTOF10: 0

## 2020-11-06 NOTE — PROGRESS NOTES
This RN received report from off going nurse. Patient and family aware that patient is transferring to [facility] room 495. Patient had incontinent episode of urine. Flavia care provided and linens changed. Gave report to Shasta VALENTIN on 4 w.     8344- transport arrived to take patient to 4w. Tele monitor capture confirmed with monitor room tech. Patient's humalog sent to 4 w via tube station. Patient's belongings including top dentures and bonnet , blanket and bath robe sent with the patient's  .

## 2020-11-06 NOTE — PROGRESS NOTES
Gastroenterology Progress Note    Matilde Nobles is a 70 y.o. female patient. Hospitalization Day:3    Chief C/O: GIB    SUBJECTIVE: Patient was seen and examined in the intensive care unit, hemodynamically stable, extubated on RA, no further melena, hemoglobin is 8.9, on octreotide infusion. ROS:  Gastrointestinal ROS: no abdominal pain, change in bowel habits, or black or bloody stools    Physical    VITALS:  BP (!) 130/56   Pulse 68   Temp 97.9 °F (36.6 °C) (Oral)   Resp 10   Ht 5' 3\" (1.6 m)   Wt 141 lb (64 kg)   SpO2 100%   BMI 24.98 kg/m²   TEMPERATURE:  Current - Temp: 97.9 °F (36.6 °C); Max - Temp  Av °F (36.7 °C)  Min: 97.7 °F (36.5 °C)  Max: 98.4 °F (36.9 °C)    General:  Alert and oriented,  No apparent distress  Skin- without jaundice  Eyes: anicteric sclera  Cardiac: RRR, Nl s1s2, without murmurs  Lungs CTA Bilaterally, normal effort  Abdomen soft, ND, NT, no HSM, Bowel sounds normal  Ext: without edema  Neuro: no asterixis     Data    Data Review:    Recent Labs     20  0528  20  1734 20  2305 20  05   WBC 7.6  --  7.5  --   --   --  7.0   HGB 7.9*   < > 8.8*   < > 11.0* 10.1* 8.9*   HCT 24.0*   < > 27.5*   < > 35.1* 31.1* 27.4*   MCV 84.7  --  86.9  --   --   --  87.1     --  150  --   --   --  159    < > = values in this interval not displayed. Recent Labs     20  0906  20  0511 20  0955 20  05   *  --  146*  --  146*   K 4.6  --  4.4  --  3.9   *  --  114*  --  115*   CO2 22  --  21  --  21   BUN 65*  --  64*  --  66*   CREATININE 3.08*   < > 2.73* 3.0* 3.01*    < > = values in this interval not displayed. Recent Labs     20  0906 20  0520   AST 99* 70* 62*   ALT 41* 32 29   BILITOT 1.2* 0.9* 0.8*   ALKPHOS 403* 352* 324*     No results for input(s): LIPASE, AMYLASE in the last 72 hours.   Recent Labs     20  1627 20  0520   PROTIME 14.6 15.0* 14.5   INR 1.1 1.2 1.1         ASSESSMENT:  74-year-old female admitted with GI bleeding, hemoglobin was 4.8 on arrival, today 8.9, post EGD notable for large oozing esophageal varices requiring banding. No overt bleeding at this time,   Hemodynamically stable. PLAN :  1.  Okay for clear liquid diet  2. Continue octreotide and ceftriaxone  3.   Serial H&H, trend and transfuse to keep hemoglobin greater than 7.5    After 5 pm today Dr Isaak Crabtree is covering hospital call for GI/hepatology, please feel free to reach out to the answering service for any related questions or concerns      Michael Duke, APRN - CNP

## 2020-11-06 NOTE — PROGRESS NOTES
[facility] EMERGENCY [facility] AT [place] Respiratory Therapy Evaluation   Current Order:  Albuterol q4prn      Home Regimen: none      Ordering Physician: paulette  Re-evaluation Date:  --     Diagnosis: gi bleed      Patient Status: Stable / Unstable + Physician notified    The following MDI Criteria must be met in order to convert aerosol to MDI with spacer. If unable to meet, MDI will be converted to aerosol:  []  Patient able to demonstrate the ability to use MDI effectively  []  Patient alert and cooperative  []  Patient able to take deep breath with 5-10 second hold  []  Medication(s) available in this delivery method   []  Peak flow greater than or equal to 200 ml/min            Current Order Substituted To  (same drug, same frequency)   Aerosol to MDI [] Albuterol Sulfate 0.083% unit dose by aerosol Albuterol Sulfate MDI 2 puffs by inhalation with spacer    [] Levalbuterol 1.25 mg unit dose by aerosol Levalbuterol MDI 2 puffs by inhalation with spacer    [] Levalbuterol 0.63 mg unit dose by aerosol Levalbuterol MDI 2 puffs by inhalation with spacer    [] Ipratropium Bromide 0.02% unit dose by aerosol Ipratropium Bromide MDI 2 puffs by inhalation with spacer    [] Duoneb (Ipratropium + Albuterol) unit dose by aerosol Ipratropium MDI + Albuterol MDI 2 puffs by inhalation w/spacer   MDI to Aerosol [] Albuterol Sulfate MDI Albuterol Sulfate 0.083% unit dose by aerosol    [] Levalbuterol MDI 2 puffs by inhalation Levalbuterol 1.25 mg unit dose by aerosol    [] Ipratropium Bromide MDI by inhalation Ipratropium Bromide 0.02% unit dose by aerosol    [] Combivent (Ipratropium + Albuterol) MDI by inhalation Duoneb (Ipratropium + Albuterol) unit dose by aerosol   Treatment Assessment [Frequency/Schedule]:  Change frequency to: ________albuterol q4prn_________________________________________per Protocol, P&T, MEC      Points 0 1 2 3 4   Pulmonary Status  Non-Smoker  [x]   Smoking history   < 20 pack years  []   Smoking history  ?  20 pack years  []   Pulmonary Disorder  (acute or chronic)  []   Severe or Chronic w/ Exacerbation  []     Surgical Status No [x]   Surgeries     General []   Surgery Lower []   Abdominal Thoracic or []   Upper Abdominal Thoracic with  PulmonaryDisorder  []     Chest X-ray Clear/Not  Ordered     []  Chronic Changes  Results Pending  []  Infiltrates, atelectasis, pleural effusion, or edema  []  Infiltrates in more than one lobe []  Infiltrate + Atelectasis, &/or pleural effusion  [x]    Respiratory Pattern Regular,  RR = 12-20 [x]  Increased,  RR = 21-25 []  EDWARDS, irregular,  or RR = 26-30 []  Decreased FEV1  or RR = 31-35 []  Severe SOB, use  of accessory muscles, or RR ? 35  []    Mental Status Alert, oriented,  Cooperative [x]  Confused but Follows commands []  Lethargic or unable to follow commands []  Obtunded  []  Comatose  []    Breath Sounds Clear to  auscultation  [x]  Decreased unilaterally or  in bases only []  Decreased  bilaterally  []  Crackles or intermittent wheezes []  Wheezes []    Cough Strong, Spontan., & nonproductive [x]  Strong,  spontaneous, &  productive []  Weak,  Nonproductive []  Weak, productive or  with wheezes []  No spontaneous  cough or may require suctioning []    Level of Activity Ambulatory []  Ambulatory w/ Assist  [x]  Non-ambulatory []  Paraplegic []  Quadriplegic []    Total    Score:__5____     Triage Score:____5____      Tri       Triage:     1. (>20) Freq: Q3    2. (16-20) Freq: Q4   3. (11-15) Freq: QID & Albuterol Q2 PRN    4. (6-10) Freq: TID & Albuterol Q2 PRN    5. (0-5) Freq Q4prn

## 2020-11-06 NOTE — PROGRESS NOTES
Pulmonary & Critical Care Medicine ICU Progress Note  Chief complaint : resp failure     Subjunctive/24 hour events :   Patient seen and examined during multidisciplinary rounds with RN, charge nurse, RT, pharmacy, dietitian, and social service. Extubated yesterday, no pulmonary issues, currently on room air, she denies chest pain or coughing, denies abdominal pain, no hematemesis, no active GI bleed, no fever overnight, urine output 600 cc, no bowel movement.       Social History     Tobacco Use    Smoking status: Never Smoker    Smokeless tobacco: Never Used   Substance Use Topics    Alcohol use: No         Problem Relation Age of Onset    Cancer Mother         stomach    Cancer Father         throat    Arthritis Neg Hx     Asthma Neg Hx     Birth Defects Neg Hx     Depression Neg Hx     Diabetes Neg Hx     Early Death Neg Hx     Hearing Loss Neg Hx     Heart Disease Neg Hx     High Blood Pressure Neg Hx     High Cholesterol Neg Hx     Kidney Disease Neg Hx     Learning Disabilities Neg Hx     Mental Illness Neg Hx     Mental Retardation Neg Hx     Miscarriages / Stillbirths Neg Hx     Stroke Neg Hx     Substance Abuse Neg Hx     Vision Loss Neg Hx     Other Neg Hx        Recent Labs     11/05/20  0955 11/05/20  1247   PHART 7.415 7.373   [id number] 36 38   PO2ART 75* 66*       MV Settings:  Vent Mode: (S) CPAP Rate Set: 16 bmp/Vt Ordered: 350 mL/ /FiO2 : 50 %           IV:   sodium chloride      octreotide (SANDOSTATIN) infusion 50 mcg/hr (11/06/20 0554)    dextrose Stopped (11/03/20 1750)       Vitals:  BP (!) 150/42   Pulse 69   Temp 97.9 °F (36.6 °C) (Oral)   Resp 15   Ht 5' 3\" (1.6 m)   Wt 141 lb (64 kg)   SpO2 100%   BMI 24.98 kg/m²    Tmax:        Intake/Output Summary (Last 24 hours) at 11/6/2020 1022  Last data filed at 11/6/2020 0616  Gross per 24 hour   Intake 2221 ml   Output 900 ml   Net 1321 ml       EXAM:  General: Awake and alert, no distress  Head: normocephalic, atraumatic  Eyes:No gross abnormalities. ENT:  MMM no lesions,    Neck:  supple and no masses  Chest : Good air movement, no wheezing, minimal rales at the bases, nontender, tympanic  Heart[de-identified] Heart sounds are normal.  Regular rate and rhythm without murmur, gallop or rub. ABD:  symmetric, soft, non-tender, no guarding or rebound, distended similar to yesterday  Musculoskeletal : no cyanosis, no clubbing and no edema  Neuro: Awake and alert, weak no focal neuro deficit  Skin: No rashes or nodules noted. Lymph node:  no cervical nodes  Urology: No Hanks   Psychiatric: Calm    Medications:  Scheduled Meds:   lidocaine  5 mL Intradermal Once    sodium chloride flush  10 mL Intravenous 2 times per day    sodium chloride flush  10 mL Intravenous 2 times per day    pantoprazole  40 mg Intravenous BID    And    sodium chloride (PF)  10 mL Intravenous BID    insulin lispro  0-6 Units Subcutaneous Q4H    sodium chloride  20 mL Intravenous Once    sodium chloride flush  10 mL Intravenous 2 times per day    cefTRIAXone (ROCEPHIN) IV  1 g Intravenous Q24H       PRN Meds:  sodium chloride flush, sodium chloride flush, sodium chloride flush, acetaminophen **OR** acetaminophen, promethazine **OR** ondansetron, albuterol, glucose, dextrose, glucagon (rDNA), dextrose    Results: reviewed by me   CBC:   Recent Labs     11/04/20  0528  11/05/20  0511  11/05/20  1734 11/05/20  2305 11/06/20  0526   WBC 7.6  --  7.5  --   --   --  7.0   HGB 7.9*   < > 8.8*   < > 11.0* 10.1* 8.9*   HCT 24.0*   < > 27.5*   < > 35.1* 31.1* 27.4*   MCV 84.7  --  86.9  --   --   --  87.1     --  150  --   --   --  159    < > = values in this interval not displayed.      BMP:   Recent Labs     11/04/20  0906  11/05/20  0511 11/05/20  0955 11/06/20  0526   *  --  146*  --  146*   K 4.6  --  4.4  --  3.9   *  --  114*  --  115*   CO2 22  --  21  --  21   BUN 65*  --  64*  --  66*   CREATININE 3.08*   < > 2.73* 3.0* 3.01*    < > = values in this interval not displayed. LIVER PROFILE:   Recent Labs     11/03/20  1045 11/04/20  0906 11/05/20  0511 11/06/20  0526   AST 96* 99* 70* 62*   ALT 44* 41* 32 29   LIPASE 64  --   --   --    BILITOT 0.8* 1.2* 0.9* 0.8*   ALKPHOS 472* 403* 352* 324*     PT/INR:   Recent Labs     11/04/20  1627 11/05/20  0511 11/06/20  0526   PROTIME 14.6 15.0* 14.5   INR 1.1 1.2 1.1     APTT:   Recent Labs     11/03/20  1045   APTT 32.8     UA:No results for input(s): NITRITE, COLORU, PHUR, LABCAST, WBCUA, RBCUA, MUCUS, TRICHOMONAS, YEAST, BACTERIA, CLARITYU, SPECGRAV, LEUKOCYTESUR, UROBILINOGEN, BILIRUBINUR, BLOODU, GLUCOSEU, AMORPHOUS in the last 72 hours. Invalid input(s): Germán Marshall    Cultures:  Negative so far  Films:  CXR reviewed by me and it showed congested, increased compared to yesterday    Assessment: This is a critically ill patient at risk of deterioration / death , needing close ICU monitoring and intervention due to below noted problems     · Respiratory insufficiency secondary to upper GI bleed  · Volume overload with pulmonary congestion/edema  · Acute blood loss anemia secondary to upper GI bleed due to brisk variceal bleed  · Pleural effusion secondary to volume overload  · Cholangiocarcinoma  · Acute on chronic renal failure secondary to above      Recommendation  · Lasix 40 mg IV x1  · Monitor urine output and electrolytes,  · DC IV fluid  · Start clear liquid diet, I discussed with GI yesterday cleared patient for oral intake  · head of the bed 30°  ·  TV on, natural light, avoid benzos, pain control, early mobility, and family engagement  · PUD prophylaxis  · DVT prophylaxis, SCDs currently not on subcu heparin due to GI bleed  · Bladder scans as needed  · Blood sugar target 140-180  · Transfer to floor today  · Continue octreotide        I spent 30 min with this patient, greater the 50% of this time was spent in counseling and/or coordinating of care.             Electronically signed by Peter Diana MD,  FCCP ,on 11/6/2020 at 10:22 AM

## 2020-11-06 NOTE — PROGRESS NOTES
Palliative Care Consult Note  Patient: Susie Pagan  Gender: female  YOB: 1945  Unit/Bed: IC03/IC03-01  CodeStatus: Full Code  Inpatient Treatment Team: Treatment Team: Attending Provider: Mariann Herring DO; Consulting Physician: Srinivas Greer MD; Utilization Reviewer: Cuauhtemoc Carmona RN; Surgeon: Srinivas Greer MD; : Chastity Mustafa; : BRIAN Blackmon; Registered Nurse: Machelle Sol RN; Consulting Physician: Ernestina Rust MD; Registered Nurse: Fredrick Jean Baptiste RN  Admit Date:  11/3/2020    Chief Complaint: follow up    History of Presenting Illness:      Susie Pagan is a 70 y.o. 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 Patient presented to the E.D on 11/3 with complaints of black tarry stool x 1 day. She has recently had a rubber band ligation done at [address] [place] [address]. Patient was hospitalized multiple times in the last month for various reasons including Gi bleed. She was recently discharged 10/14 after evaluation for Gi bleed. She underwent EGD that showed large esophageal varices with a large clot in the stomach. Upon presentation to the E. D.patient Hgb was 4. 8. Patient was consulted by Gi and was started on Protonix drip and atb. Patient was extubated yesterday. Consulted dietitian. Plan is to start on clear liquid diet today as per GI. No fever, emesis or any melena.  is at bedside. Alert but confused. Palliative care discussed goals of care with two daughters. Patient to remain Full Code. Palliative care contacted daughter today and plan is to get POA papers from mother's house today and fax it to palliative care. Fax number was provided to daughter. Patient is in process to be transferred on the 4th floor today. Plan for patient to be discharged home with [place] [phone] and with outpatient palliative care services.       Review of Systems:       Review of Systems Constitutional: Negative for chills, fatigue, fever and unexpected weight change. HENT: Negative for congestion, mouth sores, sinus pain, sore throat and trouble swallowing. Respiratory: Negative for cough, chest tightness, shortness of breath and wheezing. Cardiovascular: Negative for chest pain, palpitations and leg swelling. Gastrointestinal: Negative for abdominal pain, constipation, diarrhea, nausea and vomiting. Endocrine: Negative for polydipsia, polyphagia and polyuria. Genitourinary: Negative for dysuria, flank pain, frequency and urgency. Musculoskeletal: Positive for gait problem. Negative for arthralgias, back pain, joint swelling and myalgias. Skin: Negative. Neurological: Positive for weakness. Negative for tremors, seizures, speech difficulty, numbness and headaches. Psychiatric/Behavioral: Positive for confusion. Negative for agitation, sleep disturbance and suicidal ideas. The patient is not nervous/anxious. Physical Examination:       BP (!) 130/56   Pulse 68   Temp 97.9 °F (36.6 °C) (Oral)   Resp 10   Ht 5' 3\" (1.6 m)   Wt 141 lb (64 kg)   SpO2 100%   BMI 24.98 kg/m²    Physical Exam  Constitutional:       Appearance: She is well-developed. HENT:      Head: Normocephalic and atraumatic. Eyes:      Pupils: Pupils are equal, round, and reactive to light. Neck:      Musculoskeletal: Normal range of motion and neck supple. Cardiovascular:      Rate and Rhythm: Normal rate and regular rhythm. Heart sounds: No murmur. No friction rub. No gallop. Pulmonary:      Effort: Pulmonary effort is normal.      Breath sounds: Normal breath sounds. No wheezing or rales. Chest:      Chest wall: No tenderness. Abdominal:      General: Bowel sounds are normal. There is distension. Palpations: Abdomen is soft. Tenderness: There is no abdominal tenderness. There is no guarding. Musculoskeletal: Normal range of motion.          General: No tenderness or deformity. Skin:     General: Skin is warm and dry. Findings: No erythema or rash. Neurological:      Mental Status: She is alert and oriented to person, place, and time. Motor: Weakness present.       Coordination: Coordination abnormal.      Gait: Gait abnormal.         Allergies:      No Known Allergies    Medications:      Current Facility-Administered Medications   Medication Dose Route Frequency Provider Last Rate Last Dose    lidocaine 2 % injection 5 mL  5 mL Intradermal Once Abel Stephenson MD        sodium chloride flush 0.9 % injection 10 mL  10 mL Intravenous 2 times per day Abel Stephenson MD   10 mL at 11/06/20 0747    sodium chloride flush 0.9 % injection 10 mL  10 mL Intravenous PRN Abel Stephenson MD        0.9 % sodium chloride infusion  250 mL Intravenous Once Abel Stephenson MD        sodium chloride flush 0.9 % injection 10 mL  10 mL Intravenous 2 times per day Roscoe Nolan MD   10 mL at 11/06/20 0748    sodium chloride flush 0.9 % injection 10 mL  10 mL Intravenous PRN Roscoe Nolan MD        pantoprazole (PROTONIX) injection 40 mg  40 mg Intravenous BID Braulio Harrison MD   40 mg at 11/06/20 0749    And    sodium chloride (PF) 0.9 % injection 10 mL  10 mL Intravenous BID Braulio Harrison MD   10 mL at 11/06/20 0750    insulin lispro (HUMALOG) injection vial 0-6 Units  0-6 Units Subcutaneous Q4H Kimberli Holguin, DO   1 Units at 11/06/20 0509    0.9 % sodium chloride bolus  20 mL Intravenous Once Kimberli Holguin, DO        sodium chloride flush 0.9 % injection 10 mL  10 mL Intravenous 2 times per day Kimberli Holguin, DO   10 mL at 11/04/20 2110    sodium chloride flush 0.9 % injection 10 mL  10 mL Intravenous PRN Kimberli Solaress, DO        acetaminophen (TYLENOL) tablet 650 mg  650 mg Oral Q6H PRN Kimberli Solaress, DO        Or    acetaminophen (TYLENOL) suppository 650 mg  650 mg Rectal Q6H PRN Kimberli Solaress, DO        promethazine (PHENERGAN) tablet 12.5 mg  12.5 mg Oral Q6H PRN Ladena Ludington, DO        Or    ondansetron (ZOFRAN) injection 4 mg  4 mg Intravenous Q6H PRN Ladena Ludington, DO        octreotide (SANDOSTATIN) 500 mcg in sodium chloride 0.9 % 100 mL infusion  50 mcg/hr Intravenous Continuous Karalee Benigno, APRN - CNP 10 mL/hr at 11/06/20 1033 50 mcg/hr at 11/06/20 1033    albuterol (PROVENTIL) nebulizer solution 2.5 mg  2.5 mg Nebulization Q4H PRN Ladena Meño, DO        cefTRIAXone (ROCEPHIN) 1 g IVPB in 50 mL D5W minibag  1 g Intravenous Q24H Karalee Benigno, APRN - CNP   Stopped at 11/05/20 2000    glucose (GLUTOSE) 40 % oral gel 15 g  15 g Oral PRN Ladena Ludington, DO        dextrose 50 % IV solution  12.5 g Intravenous PRN Ladena Ludington, DO   12.5 g at 11/03/20 1817    glucagon (rDNA) injection 1 mg  1 mg Intramuscular PRN Ladena Ludington, DO        dextrose 5 % solution  100 mL/hr Intravenous PRN Ladena Meño, DO   Stopped at 11/03/20 1750       History: PMHx:  Past Medical History:   Diagnosis Date    Ascites     Cancer [facility])     liver    CKD (chronic kidney disease)     Depression     Diabetes mellitus ([facility]     History of blood transfusion     Hydronephrosis     Hypertension     Pneumonia     Upper GI bleed        PSHx:  Past Surgical History:   Procedure Laterality Date    PARACENTESIS Left 10/06/2020    3330 cc by Dr. Nory Norwood.     UPPER GASTROINTESTINAL ENDOSCOPY N/A 10/13/2020    EGD ESOPHAGOGASTRODUODENOSCOPY WITH BANDING to esophageal varices sites performed by Leo Solorio MD at [address] [place] [address] N/A 11/4/2020    EGD ESOPHAGOGASTRODUODENOSCOPY performed by Leo Solorio MD at [facility] in [place] Hx:  Social History     Socioeconomic History    Marital status:      Spouse name: None    Number of children: None    Years of education: None    Highest education level: None   Occupational History    None   Social Needs    Financial resource strain: None    Food insecurity Worry: None     Inability: None    Transportation needs     Medical: None     Non-medical: None   Tobacco Use    Smoking status: Never Smoker    Smokeless tobacco: Never Used   Substance and Sexual Activity    Alcohol use: No    Drug use: No    Sexual activity: Yes     Partners: Male   Lifestyle    Physical activity     Days per week: None     Minutes per session: None    Stress: None   Relationships    Social connections     Talks on phone: None     Gets together: None     Attends Hinduism service: None     Active member of club or organization: None     Attends meetings of clubs or organizations: None     Relationship status: None    Intimate partner violence     Fear of current or ex partner: None     Emotionally abused: None     Physically abused: None     Forced sexual activity: None   Other Topics Concern    None   Social History Narrative    None       Family Hx:  Family History   Problem Relation Age of Onset    Cancer Mother         stomach    Cancer Father         throat    Arthritis Neg Hx     Asthma Neg Hx     Birth Defects Neg Hx     Depression Neg Hx     Diabetes Neg Hx     Early Death Neg Hx     Hearing Loss Neg Hx     Heart Disease Neg Hx     High Blood Pressure Neg Hx     High Cholesterol Neg Hx     Kidney Disease Neg Hx     Learning Disabilities Neg Hx     Mental Illness Neg Hx     Mental Retardation Neg Hx     Miscarriages / Stillbirths Neg Hx     Stroke Neg Hx     Substance Abuse Neg Hx     Vision Loss Neg Hx     Other Neg Hx        LABS: Reviewed     CBC:  Lab Results   Component Value Date    WBC 7.0 11/06/2020    RBC 3.14 11/06/2020    HGB 8.9 11/06/2020    HCT 27.4 11/06/2020    MCV 87.1 11/06/2020    MCH 28.4 11/06/2020    MCHC 32.7 11/06/2020    RDW 17.7 11/06/2020     11/06/2020     CBC with Differential:   Lab Results   Component Value Date    WBC 7.0 11/06/2020    RBC 3.14 11/06/2020    HGB 8.9 11/06/2020    HCT 27.4 11/06/2020     11/06/2020    MCV 87.1 11/06/2020    MCH 28.4 11/06/2020    MCHC 32.7 11/06/2020    RDW 17.7 11/06/2020    NRBC 1 10/07/2020    BANDSPCT 1 09/24/2020    LYMPHOPCT 19.3 11/06/2020    MONOPCT 8.5 11/06/2020    BASOPCT 1.0 11/06/2020    MONOSABS 0.6 11/06/2020    LYMPHSABS 1.4 11/06/2020    EOSABS 0.1 11/06/2020    BASOSABS 0.1 11/06/2020     CMP:    Lab Results   Component Value Date     11/06/2020    K 3.9 11/06/2020     11/06/2020    CO2 21 11/06/2020    BUN 66 11/06/2020    CREATININE 3.01 11/06/2020    GFRAA 18.4 11/06/2020    LABGLOM 15.2 11/06/2020    GLUCOSE 150 11/06/2020    GLUCOSE 576 07/13/2020    PROT 5.6 11/06/2020    LABALBU 1.6 11/06/2020    LABALBU 2.7 07/13/2020    CALCIUM 7.2 11/06/2020    BILITOT 0.8 11/06/2020    ALKPHOS 324 11/06/2020    AST 62 11/06/2020    ALT 29 11/06/2020     BMP:    Lab Results   Component Value Date     11/06/2020    K 3.9 11/06/2020     11/06/2020    CO2 21 11/06/2020    BUN 66 11/06/2020    LABALBU 1.6 11/06/2020    LABALBU 2.7 07/13/2020    CREATININE 3.01 11/06/2020    CALCIUM 7.2 11/06/2020    GFRAA 18.4 11/06/2020    LABGLOM 15.2 11/06/2020    GLUCOSE 150 11/06/2020    GLUCOSE 576 07/13/2020     TSH: No results found for: TSH  Vitamin B12 and Folate: No components found for: FOLIC,  P04  Urinalysis:   Lab Results   Component Value Date    NITRU Negative 10/04/2020    WBCUA 0-2 10/04/2020    WBCUA 2 07/13/2020    BACTERIA Negative 10/04/2020    RBCUA 20-50 10/04/2020    RBCUA 9 07/13/2020    BLOODU MODERATE 10/04/2020    SPECGRAV 1.012 10/04/2020    GLUCOSEU Negative 10/04/2020           FUNCTIONAL ADL´S:     Independent: [  ] Eating, [   ] Dressing, [   ] Transferring, [   ] Champ Marilin, [   ] Mg Chuy, [   ] Continence  Dependent   : [  ] Eating, [   ] Dressing, [   ] Transferring, [   ] Champ Randaack, [   ] Bathing, [   ] Continence  W. assistant : [  ] Eating, [   ] Dressing, [   ] Transferring, [   ] Champ Marilin, [   ] Mg Chuy, [   ] Continence    Radiology: Reviewed      Xr Chest Portable    Result Date: 11/6/2020  EXAMINATION: XR CHEST PORTABLE CLINICAL HISTORY: RESPIRATORY FAILURE COMPARISONS: OCTOBER 4, 2020 FINDINGS: Osseous structures intact. Cardiopericardial silhouette normal. Thickening minor fissure. Ill-defined areas increase opacity predominantly right mid and right lower lung, and left lower lobe. Blunting costophrenic angles. BILATERAL PLEURAL EFFUSION WITH BILATERAL LOWER LUNG ATELECTASIS/PNEUMONIA. Xr Chest Portable    Result Date: 11/4/2020  Exam: XR CHEST PORTABLE History:  intubation Technique: AP portable view of the chest obtained. Comparison: Chest x-ray from October 13, 2020 Findings: The patient is intubated with the tip of the endotracheal tube and the right mainstem bronchus. .  There is no pneumothorax. The cardiomediastinal silhouette is within normal limits. There are mild increased pulmonary markings in the right lung base. There is opacity in the left inferior hemithorax representing effusion with underlying atelectasis or infiltrate. Bones of the thorax appear intact. Status post intubation with the tip of the endotracheal tube in the right mainstem bronchus. Recommend retracting 2 to 3 cm. There is a left pleural effusion with underlying atelectasis versus infiltrate and mild atelectasis versus infiltrate in the right lung base. Assessment and plan:  1. Acute Gi bleed secondary to esophageal varices  -consulted GI  -starting clear liquid diet today  -protonix drip,ocreotide drip and atb-ceftriaxone as per Gi recommendation  -received blood transfusion  -EGD on 11/4-4 columns of large esophageal varices, large clot in stomach with active bleeding  -keep Hgb >7.5  2. Cholangiocarcinoma with liver mets  -active with oncology at [facility]  3. IAIN on CKDIII  -monitor renal function and urine output   -strict I&O  -avoid nephrotoxic medication  4. Bilateral pleural effusion secondary to volume overload  -CXR today  -lasix 40mg IVx 1 today  5. Palliative care Encounter  -discussed goals of care with daughter Kiarra Journey  -currently the plan to seek aggressive treatment  -continue with Full Code  -in agreement to be followed by palliative care as outpatient for symptom management  -plan is to go home with [facility] to sign off from palliative care  -POA papers to be faxed to palliative care today and to be scanned into the system once received from daughter.         Electronically signed by DARY Cee NP on 11/6/2020 at 11:47 AM

## 2020-11-07 LAB
ALBUMIN SERPL-MCNC: 1.9 G/DL (ref 3.5–4.6)
ALP BLD-CCNC: 380 U/L (ref 40–130)
ALT SERPL-CCNC: 30 U/L (ref 0–33)
ANION GAP SERPL CALCULATED.3IONS-SCNC: 12 MEQ/L (ref 9–15)
AST SERPL-CCNC: 53 U/L (ref 0–35)
BASOPHILS ABSOLUTE: 0.1 K/UL (ref 0–0.2)
BASOPHILS RELATIVE PERCENT: 1.3 %
BILIRUB SERPL-MCNC: 0.8 MG/DL (ref 0.2–0.7)
BLOOD BANK DISPENSE STATUS: NORMAL
BLOOD BANK DISPENSE STATUS: NORMAL
BLOOD BANK PRODUCT CODE: NORMAL
BLOOD BANK PRODUCT CODE: NORMAL
BPU ID: NORMAL
BPU ID: NORMAL
BUN BLDV-MCNC: 61 MG/DL (ref 8–23)
CALCIUM SERPL-MCNC: 7.6 MG/DL (ref 8.5–9.9)
CHLORIDE BLD-SCNC: 115 MEQ/L (ref 95–107)
CO2: 20 MEQ/L (ref 20–31)
CREAT SERPL-MCNC: 2.73 MG/DL (ref 0.5–0.9)
DESCRIPTION BLOOD BANK: NORMAL
DESCRIPTION BLOOD BANK: NORMAL
EOSINOPHILS ABSOLUTE: 0.1 K/UL (ref 0–0.7)
EOSINOPHILS RELATIVE PERCENT: 1.4 %
GFR AFRICAN AMERICAN: 20.6
GFR NON-AFRICAN AMERICAN: 17
GLOBULIN: 4.3 G/DL (ref 2.3–3.5)
GLUCOSE BLD-MCNC: 133 MG/DL (ref 60–115)
GLUCOSE BLD-MCNC: 232 MG/DL (ref 60–115)
GLUCOSE BLD-MCNC: 234 MG/DL (ref 70–99)
GLUCOSE BLD-MCNC: 259 MG/DL (ref 60–115)
GLUCOSE BLD-MCNC: 409 MG/DL (ref 60–115)
HCT VFR BLD CALC: 30.1 % (ref 37–47)
HCT VFR BLD CALC: 32 % (ref 37–47)
HCT VFR BLD CALC: 32.3 % (ref 37–47)
HEMOGLOBIN: 10.1 G/DL (ref 12–16)
HEMOGLOBIN: 10.3 G/DL (ref 12–16)
HEMOGLOBIN: 9.8 G/DL (ref 12–16)
INR BLD: 1.1
LYMPHOCYTES ABSOLUTE: 1.1 K/UL (ref 1–4.8)
LYMPHOCYTES RELATIVE PERCENT: 16.9 %
MCH RBC QN AUTO: 28.6 PG (ref 27–31.3)
MCHC RBC AUTO-ENTMCNC: 32.5 % (ref 33–37)
MCV RBC AUTO: 87.8 FL (ref 82–100)
MONOCYTES ABSOLUTE: 0.6 K/UL (ref 0.2–0.8)
MONOCYTES RELATIVE PERCENT: 8.8 %
NEUTROPHILS ABSOLUTE: 4.9 K/UL (ref 1.4–6.5)
NEUTROPHILS RELATIVE PERCENT: 71.6 %
PDW BLD-RTO: 18.5 % (ref 11.5–14.5)
PERFORMED ON: ABNORMAL
PLATELET # BLD: 165 K/UL (ref 130–400)
POTASSIUM REFLEX MAGNESIUM: 4 MEQ/L (ref 3.4–4.9)
PROTHROMBIN TIME: 14.2 SEC (ref 12.3–14.9)
RBC # BLD: 3.43 M/UL (ref 4.2–5.4)
SODIUM BLD-SCNC: 147 MEQ/L (ref 135–144)
TOTAL PROTEIN: 6.2 G/DL (ref 6.3–8)
URINE CULTURE, ROUTINE: NORMAL
WBC # BLD: 6.8 K/UL (ref 4.8–10.8)

## 2020-11-07 PROCEDURE — C9113 INJ PANTOPRAZOLE SODIUM, VIA: HCPCS | Performed by: INTERNAL MEDICINE

## 2020-11-07 PROCEDURE — 6370000000 HC RX 637 (ALT 250 FOR IP): Performed by: INTERNAL MEDICINE

## 2020-11-07 PROCEDURE — 85025 COMPLETE CBC W/AUTO DIFF WBC: CPT

## 2020-11-07 PROCEDURE — 85610 PROTHROMBIN TIME: CPT

## 2020-11-07 PROCEDURE — 80053 COMPREHEN METABOLIC PANEL: CPT

## 2020-11-07 PROCEDURE — 85014 HEMATOCRIT: CPT

## 2020-11-07 PROCEDURE — 2580000003 HC RX 258: Performed by: INTERNAL MEDICINE

## 2020-11-07 PROCEDURE — 6360000002 HC RX W HCPCS: Performed by: NURSE PRACTITIONER

## 2020-11-07 PROCEDURE — 36415 COLL VENOUS BLD VENIPUNCTURE: CPT

## 2020-11-07 PROCEDURE — 6360000002 HC RX W HCPCS: Performed by: INTERNAL MEDICINE

## 2020-11-07 PROCEDURE — 2580000003 HC RX 258: Performed by: NURSE PRACTITIONER

## 2020-11-07 PROCEDURE — 1210000000 HC MED SURG R&B

## 2020-11-07 PROCEDURE — 85018 HEMOGLOBIN: CPT

## 2020-11-07 RX ORDER — DEXTROSE MONOHYDRATE 25 G/50ML
12.5 INJECTION, SOLUTION INTRAVENOUS PRN
Status: DISCONTINUED | OUTPATIENT
Start: 2020-11-07 | End: 2020-11-09 | Stop reason: HOSPADM

## 2020-11-07 RX ORDER — NICOTINE POLACRILEX 4 MG
15 LOZENGE BUCCAL PRN
Status: DISCONTINUED | OUTPATIENT
Start: 2020-11-07 | End: 2020-11-09 | Stop reason: HOSPADM

## 2020-11-07 RX ORDER — INSULIN GLARGINE 100 [IU]/ML
15 INJECTION, SOLUTION SUBCUTANEOUS NIGHTLY
Status: DISCONTINUED | OUTPATIENT
Start: 2020-11-07 | End: 2020-11-08

## 2020-11-07 RX ORDER — DEXTROSE MONOHYDRATE 50 MG/ML
INJECTION, SOLUTION INTRAVENOUS CONTINUOUS
Status: ACTIVE | OUTPATIENT
Start: 2020-11-07 | End: 2020-11-07

## 2020-11-07 RX ORDER — DEXTROSE MONOHYDRATE 50 MG/ML
100 INJECTION, SOLUTION INTRAVENOUS PRN
Status: DISCONTINUED | OUTPATIENT
Start: 2020-11-07 | End: 2020-11-09 | Stop reason: HOSPADM

## 2020-11-07 RX ORDER — HYDRALAZINE HYDROCHLORIDE 50 MG/1
50 TABLET, FILM COATED ORAL EVERY 8 HOURS SCHEDULED
Status: DISCONTINUED | OUTPATIENT
Start: 2020-11-07 | End: 2020-11-09 | Stop reason: HOSPADM

## 2020-11-07 RX ADMIN — Medication 10 ML: at 23:10

## 2020-11-07 RX ADMIN — INSULIN GLARGINE 15 UNITS: 100 INJECTION, SOLUTION SUBCUTANEOUS at 23:08

## 2020-11-07 RX ADMIN — TORSEMIDE 40 MG: 20 TABLET ORAL at 08:47

## 2020-11-07 RX ADMIN — INSULIN LISPRO 10 UNITS: 100 INJECTION, SOLUTION INTRAVENOUS; SUBCUTANEOUS at 12:11

## 2020-11-07 RX ADMIN — OCTREOTIDE ACETATE 50 MCG/HR: 1000 INJECTION, SOLUTION INTRAVENOUS; SUBCUTANEOUS at 17:36

## 2020-11-07 RX ADMIN — HYDRALAZINE HYDROCHLORIDE 50 MG: 50 TABLET, FILM COATED ORAL at 16:16

## 2020-11-07 RX ADMIN — INSULIN LISPRO 10 UNITS: 100 INJECTION, SOLUTION INTRAVENOUS; SUBCUTANEOUS at 17:03

## 2020-11-07 RX ADMIN — OCTREOTIDE ACETATE 50 MCG/HR: 1000 INJECTION, SOLUTION INTRAVENOUS; SUBCUTANEOUS at 04:52

## 2020-11-07 RX ADMIN — DEXTROSE MONOHYDRATE: 50 INJECTION, SOLUTION INTRAVENOUS at 11:33

## 2020-11-07 RX ADMIN — Medication 10 ML: at 11:37

## 2020-11-07 RX ADMIN — CEFTRIAXONE SODIUM 1 G: 1 INJECTION, POWDER, FOR SOLUTION INTRAMUSCULAR; INTRAVENOUS at 16:08

## 2020-11-07 RX ADMIN — Medication 10 ML: at 09:48

## 2020-11-07 RX ADMIN — PANTOPRAZOLE SODIUM 40 MG: 40 INJECTION, POWDER, FOR SOLUTION INTRAVENOUS at 08:47

## 2020-11-07 RX ADMIN — PANTOPRAZOLE SODIUM 40 MG: 40 INJECTION, POWDER, FOR SOLUTION INTRAVENOUS at 23:07

## 2020-11-07 RX ADMIN — HYDRALAZINE HYDROCHLORIDE 50 MG: 50 TABLET, FILM COATED ORAL at 23:06

## 2020-11-07 RX ADMIN — DEXTROSE MONOHYDRATE 100 ML/HR: 50 INJECTION, SOLUTION INTRAVENOUS at 23:09

## 2020-11-07 RX ADMIN — Medication 10 ML: at 08:47

## 2020-11-07 ASSESSMENT — PAIN SCALES - GENERAL
PAINLEVEL_OUTOF10: 0
PAINLEVEL_OUTOF10: 0

## 2020-11-07 NOTE — PROGRESS NOTES
Hospitalist Progress Note      PCP: Yasmin Garcia MD    Date of Admission: 11/3/2020    Chief Complaint:      Subjective: :  Pt seen and examined. Denies acute complaints including abdominal pain, N,V. She is anxious to get out of bed. She is tolerating a liquid diet. No additional complaints. Medications:  Reviewed     Infusion Medications    dextrose 100 mL/hr at 11/07/20 1133    dextrose      sodium chloride      octreotide (SANDOSTATIN) infusion 50 mcg/hr (11/07/20 0452)    dextrose Stopped (11/03/20 1750)     Scheduled Medications    hydrALAZINE  50 mg Oral 3 times per day    insulin lispro  10 Units Subcutaneous TID WC    insulin lispro  0-12 Units Subcutaneous TID WC    insulin lispro  0-6 Units Subcutaneous Nightly    insulin glargine  15 Units Subcutaneous Nightly    torsemide  40 mg Oral Daily    lidocaine  5 mL Intradermal Once    sodium chloride flush  10 mL Intravenous 2 times per day    sodium chloride flush  10 mL Intravenous 2 times per day    pantoprazole  40 mg Intravenous BID    And    sodium chloride (PF)  10 mL Intravenous BID    sodium chloride  20 mL Intravenous Once    sodium chloride flush  10 mL Intravenous 2 times per day    cefTRIAXone (ROCEPHIN) IV  1 g Intravenous Q24H     PRN Meds: glucose, dextrose, glucagon (rDNA), dextrose, sodium chloride flush, sodium chloride flush, sodium chloride flush, acetaminophen **OR** acetaminophen, promethazine **OR** ondansetron, albuterol, glucose, dextrose, glucagon (rDNA), dextrose      Intake/Output Summary (Last 24 hours) at 11/7/2020 1149  Last data filed at 11/7/2020 0453  Gross per 24 hour   Intake 230 ml   Output 900 ml   Net -670 ml       Exam:    BP (!) 130/56   Pulse 68   Temp 97.9 °F (36.6 °C) (Oral)   Resp 10   Ht 5' 3\" (1.6 m)   Wt 141 lb (64 kg)   SpO2 100%   BMI 24.98 kg/m²     79-year-old -American female.     Alert and oriented, resting comfortably prior to entering the room  She atelectasis versus infiltrate in the right lung base. Assessment/Plan:    Active Hospital Problems    Diagnosis Date Noted    GIB (gastrointestinal bleeding) [K92.2] 11/03/2020        74-year-old -American female with a past medical history of metastatic cholangiocarcinoma, cirrhosis with complication of esophageal varices presenting with GI bleed     GIB/ Acute blood loss anemia 2/2 to Variceal bleeding: Hemoglobin  4.8 on presentation. She has a history variceal bleeding. S/p transfusion with improvement. Hemodynamically stable  -Patient is high risk of recurrent bleeding,  discontinue ASA indefinitely  - EGD with banding of multiple varices and new and old blood in the stomach.    - Octreotide drip, IV rocephin  -Gastroenterology following, diet advancement per GI recommendations     Cholangiocarcinoma, with liver mets  -Palliative medicine     T2DM patient initially presented with hypoglycemia.   Hyperglycemia noted today  -Resume home insulin regimen  - SS, hypoglycemia protocol     IAIN on CKD stage III: Suspect prerenal d/t low volume status  -Trend BMP  -Strict I's and O's monitor urine output  -Avoid nephrotoxins    Hypertension: Patient has been normotensive  -Titrate on addition of home medications               Diet: DIET CLEAR LIQUID;  Dietary Nutrition Supplements: Clear Liquid Oral Supplement    Code Status: Full Code    PT/OT Eval           Electronically signed by Quynh Rodriguez DO on 11/7/2020 at 11:49 AM

## 2020-11-07 NOTE — FLOWSHEET NOTE
[facility] completed. Writer agrees with previous assessment. Patient is alert to self and place, disoriented to time. Denies complaints. Pt resting comfortably with call light in reach.

## 2020-11-08 LAB
ALBUMIN SERPL-MCNC: 1.6 G/DL (ref 3.5–4.6)
ALP BLD-CCNC: 420 U/L (ref 40–130)
ALT SERPL-CCNC: 36 U/L (ref 0–33)
ANION GAP SERPL CALCULATED.3IONS-SCNC: 10 MEQ/L (ref 9–15)
AST SERPL-CCNC: 91 U/L (ref 0–35)
BASOPHILS ABSOLUTE: 0.1 K/UL (ref 0–0.2)
BASOPHILS RELATIVE PERCENT: 1.2 %
BILIRUB SERPL-MCNC: 0.8 MG/DL (ref 0.2–0.7)
BUN BLDV-MCNC: 55 MG/DL (ref 8–23)
CALCIUM SERPL-MCNC: 7.5 MG/DL (ref 8.5–9.9)
CHLORIDE BLD-SCNC: 107 MEQ/L (ref 95–107)
CO2: 20 MEQ/L (ref 20–31)
CREAT SERPL-MCNC: 2.72 MG/DL (ref 0.5–0.9)
EOSINOPHILS ABSOLUTE: 0.1 K/UL (ref 0–0.7)
EOSINOPHILS RELATIVE PERCENT: 1.5 %
GFR AFRICAN AMERICAN: 20.7
GFR NON-AFRICAN AMERICAN: 17.1
GLOBULIN: 4.6 G/DL (ref 2.3–3.5)
GLUCOSE BLD-MCNC: 174 MG/DL (ref 60–115)
GLUCOSE BLD-MCNC: 24 MG/DL (ref 70–99)
GLUCOSE BLD-MCNC: 299 MG/DL (ref 60–115)
GLUCOSE BLD-MCNC: 33 MG/DL (ref 60–115)
GLUCOSE BLD-MCNC: 353 MG/DL (ref 60–115)
GLUCOSE BLD-MCNC: 80 MG/DL (ref 60–115)
HCT VFR BLD CALC: 29.7 % (ref 37–47)
HCT VFR BLD CALC: 29.7 % (ref 37–47)
HCT VFR BLD CALC: 30.7 % (ref 37–47)
HCT VFR BLD CALC: 32.4 % (ref 37–47)
HEMOGLOBIN: 10.5 G/DL (ref 12–16)
HEMOGLOBIN: 9.7 G/DL (ref 12–16)
HEMOGLOBIN: 9.8 G/DL (ref 12–16)
HEMOGLOBIN: 9.9 G/DL (ref 12–16)
INR BLD: 1.1
LYMPHOCYTES ABSOLUTE: 1.5 K/UL (ref 1–4.8)
LYMPHOCYTES RELATIVE PERCENT: 15.5 %
MAGNESIUM: 1.6 MG/DL (ref 1.7–2.4)
MCH RBC QN AUTO: 28.7 PG (ref 27–31.3)
MCHC RBC AUTO-ENTMCNC: 33.2 % (ref 33–37)
MCV RBC AUTO: 86.3 FL (ref 82–100)
MONOCYTES ABSOLUTE: 0.9 K/UL (ref 0.2–0.8)
MONOCYTES RELATIVE PERCENT: 9.8 %
NEUTROPHILS ABSOLUTE: 6.8 K/UL (ref 1.4–6.5)
NEUTROPHILS RELATIVE PERCENT: 72 %
PDW BLD-RTO: 19 % (ref 11.5–14.5)
PERFORMED ON: ABNORMAL
PERFORMED ON: NORMAL
PLATELET # BLD: 194 K/UL (ref 130–400)
POTASSIUM REFLEX MAGNESIUM: 3.2 MEQ/L (ref 3.4–4.9)
PROTHROMBIN TIME: 14.7 SEC (ref 12.3–14.9)
RBC # BLD: 3.44 M/UL (ref 4.2–5.4)
SODIUM BLD-SCNC: 137 MEQ/L (ref 135–144)
TOTAL PROTEIN: 6.2 G/DL (ref 6.3–8)
WBC # BLD: 9.4 K/UL (ref 4.8–10.8)

## 2020-11-08 PROCEDURE — 2580000003 HC RX 258: Performed by: INTERNAL MEDICINE

## 2020-11-08 PROCEDURE — 85014 HEMATOCRIT: CPT

## 2020-11-08 PROCEDURE — 80053 COMPREHEN METABOLIC PANEL: CPT

## 2020-11-08 PROCEDURE — 6360000002 HC RX W HCPCS: Performed by: INTERNAL MEDICINE

## 2020-11-08 PROCEDURE — 85018 HEMOGLOBIN: CPT

## 2020-11-08 PROCEDURE — 6360000002 HC RX W HCPCS: Performed by: NURSE PRACTITIONER

## 2020-11-08 PROCEDURE — 1210000000 HC MED SURG R&B

## 2020-11-08 PROCEDURE — 2580000003 HC RX 258: Performed by: NURSE PRACTITIONER

## 2020-11-08 PROCEDURE — 36415 COLL VENOUS BLD VENIPUNCTURE: CPT

## 2020-11-08 PROCEDURE — 83735 ASSAY OF MAGNESIUM: CPT

## 2020-11-08 PROCEDURE — C9113 INJ PANTOPRAZOLE SODIUM, VIA: HCPCS | Performed by: INTERNAL MEDICINE

## 2020-11-08 PROCEDURE — 85025 COMPLETE CBC W/AUTO DIFF WBC: CPT

## 2020-11-08 PROCEDURE — 85610 PROTHROMBIN TIME: CPT

## 2020-11-08 PROCEDURE — 6370000000 HC RX 637 (ALT 250 FOR IP): Performed by: INTERNAL MEDICINE

## 2020-11-08 RX ORDER — INSULIN GLARGINE 100 [IU]/ML
10 INJECTION, SOLUTION SUBCUTANEOUS NIGHTLY
Status: DISCONTINUED | OUTPATIENT
Start: 2020-11-08 | End: 2020-11-09 | Stop reason: HOSPADM

## 2020-11-08 RX ORDER — MAGNESIUM SULFATE IN WATER 40 MG/ML
2 INJECTION, SOLUTION INTRAVENOUS ONCE
Status: COMPLETED | OUTPATIENT
Start: 2020-11-08 | End: 2020-11-08

## 2020-11-08 RX ADMIN — OCTREOTIDE ACETATE 50 MCG/HR: 1000 INJECTION, SOLUTION INTRAVENOUS; SUBCUTANEOUS at 05:27

## 2020-11-08 RX ADMIN — Medication 10 ML: at 21:39

## 2020-11-08 RX ADMIN — INSULIN GLARGINE 10 UNITS: 100 INJECTION, SOLUTION SUBCUTANEOUS at 21:58

## 2020-11-08 RX ADMIN — DEXTROSE MONOHYDRATE 12.5 G: 25 INJECTION, SOLUTION INTRAVENOUS at 07:38

## 2020-11-08 RX ADMIN — HYDRALAZINE HYDROCHLORIDE 50 MG: 50 TABLET, FILM COATED ORAL at 05:27

## 2020-11-08 RX ADMIN — CEFTRIAXONE SODIUM 1 G: 1 INJECTION, POWDER, FOR SOLUTION INTRAMUSCULAR; INTRAVENOUS at 17:15

## 2020-11-08 RX ADMIN — INSULIN LISPRO 10 UNITS: 100 INJECTION, SOLUTION INTRAVENOUS; SUBCUTANEOUS at 17:16

## 2020-11-08 RX ADMIN — PANTOPRAZOLE SODIUM 40 MG: 40 INJECTION, POWDER, FOR SOLUTION INTRAVENOUS at 08:50

## 2020-11-08 RX ADMIN — HYDRALAZINE HYDROCHLORIDE 50 MG: 50 TABLET, FILM COATED ORAL at 22:44

## 2020-11-08 RX ADMIN — MAGNESIUM SULFATE HEPTAHYDRATE 2 G: 40 INJECTION, SOLUTION INTRAVENOUS at 15:21

## 2020-11-08 RX ADMIN — DEXTROSE MONOHYDRATE 100 ML/HR: 50 INJECTION, SOLUTION INTRAVENOUS at 22:44

## 2020-11-08 NOTE — PROGRESS NOTES
Hospitalist Progress Note      PCP: Cathie Robert MD    Date of Admission: 11/3/2020    Chief Complaint:      Subjective: :  Pt seen and examined. Denies acute complaints including abdominal pain, N,V.  No additional stools reported. She is tolerating a  diet. No additional complaints including chest pain, shortness of breath, chills. Magnesium repletion ordered. Hypoglycemia noted. Medications:  Reviewed     Infusion Medications    dextrose 100 mL/hr (11/07/20 2309)    sodium chloride       Scheduled Medications    insulin glargine  10 Units Subcutaneous Nightly    magnesium sulfate  2 g Intravenous Once    hydrALAZINE  50 mg Oral 3 times per day    insulin lispro  10 Units Subcutaneous TID WC    insulin lispro  0-12 Units Subcutaneous TID WC    insulin lispro  0-6 Units Subcutaneous Nightly    torsemide  40 mg Oral Daily    lidocaine  5 mL Intradermal Once    sodium chloride flush  10 mL Intravenous 2 times per day    sodium chloride flush  10 mL Intravenous 2 times per day    pantoprazole  40 mg Intravenous BID    And    sodium chloride (PF)  10 mL Intravenous BID    sodium chloride  20 mL Intravenous Once    sodium chloride flush  10 mL Intravenous 2 times per day    cefTRIAXone (ROCEPHIN) IV  1 g Intravenous Q24H     PRN Meds: glucose, dextrose, glucagon (rDNA), dextrose, sodium chloride flush, sodium chloride flush, sodium chloride flush, acetaminophen **OR** acetaminophen, promethazine **OR** ondansetron, albuterol      Intake/Output Summary (Last 24 hours) at 11/8/2020 1413  Last data filed at 11/8/2020 0529  Gross per 24 hour   Intake 2266 ml   Output 650 ml   Net 1616 ml       Exam:    BP (!) 113/47   Pulse 95   Temp 99.1 °F (37.3 °C)   Resp 14   Ht 5' 3\" (1.6 m)   Wt 141 lb (64 kg)   SpO2 100%   BMI 24.98 kg/m²     68-year-old -American female. Alert and oriented, resting comfortably prior to entering the room  She is frail-appearing.   Normocephalic, atraumatic,   Lungs are clear to auscultation, no wheezing, rhonchi or increased work of breathing  Regular rate and rhythm, no murmur  Abdomen is distended,  soft, nontender to palpation  No LE edema. No erythema or asymmetry  She is alert and oriented to person, location and month. No rashes or lesions of the skin on exposed areas. Labs:   Recent Labs     11/06/20 0526 11/07/20 0900 11/08/20  0016 11/08/20 0619 11/08/20  1155   WBC 7.0  --  6.8  --   --  9.4  --    HGB 8.9*   < > 9.8*   < > 9.8* 9.9* 9.7*   HCT 27.4*   < > 30.1*   < > 30.7* 29.7* 29.7*     --  165  --   --  194  --     < > = values in this interval not displayed. Recent Labs     11/06/20 0526 11/07/20 0900 11/08/20 0619   * 147* 137   K 3.9 4.0 3.2*   * 115* 107   CO2 21 20 20   BUN 66* 61* 55*   CREATININE 3.01* 2.73* 2.72*   CALCIUM 7.2* 7.6* 7.5*     Recent Labs     11/06/20 0526 11/07/20 0900 11/08/20 0619   AST 62* 53* 91*   ALT 29 30 36*   BILITOT 0.8* 0.8* 0.8*   ALKPHOS 324* 380* 420*     Recent Labs     11/06/20 0526 11/07/20 0900 11/08/20 0619   INR 1.1 1.1 1.1     No results for input(s): Babatunde Oleary in the last 72 hours. Urinalysis:      Lab Results   Component Value Date    NITRU Negative 11/06/2020    WBCUA >100 11/06/2020    WBCUA 2 07/13/2020    BACTERIA Negative 11/06/2020    RBCUA 0-2 11/06/2020    RBCUA 9 07/13/2020    BLOODU Negative 11/06/2020    SPECGRAV 1.011 11/06/2020    GLUCOSEU Negative 11/06/2020       Radiology:  XR CHEST PORTABLE   Final Result   BILATERAL PLEURAL EFFUSION WITH BILATERAL LOWER LUNG ATELECTASIS/PNEUMONIA. XR CHEST PORTABLE   Final Result   Status post intubation with the tip of the endotracheal tube in the right mainstem bronchus. Recommend retracting 2 to 3 cm. There is a left pleural effusion with underlying atelectasis versus infiltrate and mild atelectasis versus infiltrate in the right lung base. Assessment/Plan:    Active Hospital Problems    Diagnosis Date Noted    GIB (gastrointestinal bleeding) [K92.2] 11/03/2020        65-year-old -American female with a past medical history of metastatic cholangiocarcinoma, cirrhosis with complication of esophageal varices presenting with GI bleed     GIB/ Acute blood loss anemia 2/2 to Variceal bleeding: Hemoglobin  4.8 on presentation. She has a history variceal bleeding. S/p transfusion with improvement. Hemodynamically stable  -Patient is high risk of recurrent bleeding,  discontinue ASA indefinitely  - EGD with banding of multiple varices and new and old blood in the stomach.    - Octreotide drip, IV rocephin  -Gastroenterology following, diet advancement per GI recommendations     Cholangiocarcinoma, with liver mets  -Palliative medicine     T2DM: Patient has had labile blood glucose. Both hypo and hyperglycemia noted yesterday and early this morning.   Home Lantus decreased to 10 units, to new short acting mealtime coverage  - SS, hypoglycemia protocol     IAIN on CKD stage III: Suspect prerenal d/t low volume status  -Serum creatinine improved  -Strict I's and O's monitor urine output  -Avoid nephrotoxins    Hypertension: Patient has been normotensive  -Titrate on home medications               Diet: Dietary Nutrition Supplements: Clear Liquid Oral Supplement  DIET DENTAL SOFT;    Code Status: Full Code    PT/OT Eval           Electronically signed by Jessie Alvarez DO on 11/8/2020 at 2:13 PM

## 2020-11-09 VITALS
HEART RATE: 86 BPM | TEMPERATURE: 98.6 F | BODY MASS INDEX: 24.98 KG/M2 | OXYGEN SATURATION: 100 % | DIASTOLIC BLOOD PRESSURE: 50 MMHG | RESPIRATION RATE: 16 BRPM | SYSTOLIC BLOOD PRESSURE: 121 MMHG | WEIGHT: 141 LBS | HEIGHT: 63 IN

## 2020-11-09 LAB
ALBUMIN SERPL-MCNC: 1.6 G/DL (ref 3.5–4.6)
ALP BLD-CCNC: 386 U/L (ref 40–130)
ALT SERPL-CCNC: 40 U/L (ref 0–33)
ANION GAP SERPL CALCULATED.3IONS-SCNC: 8 MEQ/L (ref 9–15)
AST SERPL-CCNC: 87 U/L (ref 0–35)
BASOPHILS ABSOLUTE: 0 K/UL (ref 0–0.2)
BASOPHILS RELATIVE PERCENT: 0.5 %
BILIRUB SERPL-MCNC: 0.8 MG/DL (ref 0.2–0.7)
BUN BLDV-MCNC: 48 MG/DL (ref 8–23)
CALCIUM SERPL-MCNC: 7.4 MG/DL (ref 8.5–9.9)
CHLORIDE BLD-SCNC: 100 MEQ/L (ref 95–107)
CO2: 19 MEQ/L (ref 20–31)
CREAT SERPL-MCNC: 2.42 MG/DL (ref 0.5–0.9)
EOSINOPHILS ABSOLUTE: 0.1 K/UL (ref 0–0.7)
EOSINOPHILS RELATIVE PERCENT: 1 %
GFR AFRICAN AMERICAN: 23.7
GFR NON-AFRICAN AMERICAN: 19.6
GLOBULIN: 4 G/DL (ref 2.3–3.5)
GLUCOSE BLD-MCNC: 167 MG/DL (ref 60–115)
GLUCOSE BLD-MCNC: 182 MG/DL (ref 70–99)
GLUCOSE BLD-MCNC: 185 MG/DL (ref 60–115)
GLUCOSE BLD-MCNC: 261 MG/DL (ref 60–115)
HCT VFR BLD CALC: 28.6 % (ref 37–47)
HCT VFR BLD CALC: 29.8 % (ref 37–47)
HCT VFR BLD CALC: 30.2 % (ref 37–47)
HEMOGLOBIN: 9.3 G/DL (ref 12–16)
HEMOGLOBIN: 9.5 G/DL (ref 12–16)
HEMOGLOBIN: 9.8 G/DL (ref 12–16)
INR BLD: 1.2
LYMPHOCYTES ABSOLUTE: 1.1 K/UL (ref 1–4.8)
LYMPHOCYTES RELATIVE PERCENT: 12.2 %
MAGNESIUM: 2 MG/DL (ref 1.7–2.4)
MCH RBC QN AUTO: 28.1 PG (ref 27–31.3)
MCHC RBC AUTO-ENTMCNC: 32.6 % (ref 33–37)
MCV RBC AUTO: 86.3 FL (ref 82–100)
MONOCYTES ABSOLUTE: 0.8 K/UL (ref 0.2–0.8)
MONOCYTES RELATIVE PERCENT: 9.4 %
NEUTROPHILS ABSOLUTE: 6.7 K/UL (ref 1.4–6.5)
NEUTROPHILS RELATIVE PERCENT: 76.9 %
PDW BLD-RTO: 18.6 % (ref 11.5–14.5)
PERFORMED ON: ABNORMAL
PLATELET # BLD: 164 K/UL (ref 130–400)
POTASSIUM REFLEX MAGNESIUM: 3.4 MEQ/L (ref 3.4–4.9)
PROTHROMBIN TIME: 15.4 SEC (ref 12.3–14.9)
RBC # BLD: 3.31 M/UL (ref 4.2–5.4)
SODIUM BLD-SCNC: 127 MEQ/L (ref 135–144)
TOTAL PROTEIN: 5.6 G/DL (ref 6.3–8)
WBC # BLD: 8.7 K/UL (ref 4.8–10.8)

## 2020-11-09 PROCEDURE — 2580000003 HC RX 258: Performed by: INTERNAL MEDICINE

## 2020-11-09 PROCEDURE — 85018 HEMOGLOBIN: CPT

## 2020-11-09 PROCEDURE — 83735 ASSAY OF MAGNESIUM: CPT

## 2020-11-09 PROCEDURE — C9113 INJ PANTOPRAZOLE SODIUM, VIA: HCPCS | Performed by: INTERNAL MEDICINE

## 2020-11-09 PROCEDURE — 85025 COMPLETE CBC W/AUTO DIFF WBC: CPT

## 2020-11-09 PROCEDURE — 97166 OT EVAL MOD COMPLEX 45 MIN: CPT

## 2020-11-09 PROCEDURE — 36415 COLL VENOUS BLD VENIPUNCTURE: CPT

## 2020-11-09 PROCEDURE — 6360000002 HC RX W HCPCS: Performed by: INTERNAL MEDICINE

## 2020-11-09 PROCEDURE — 85014 HEMATOCRIT: CPT

## 2020-11-09 PROCEDURE — 80053 COMPREHEN METABOLIC PANEL: CPT

## 2020-11-09 PROCEDURE — 97162 PT EVAL MOD COMPLEX 30 MIN: CPT

## 2020-11-09 PROCEDURE — 85610 PROTHROMBIN TIME: CPT

## 2020-11-09 PROCEDURE — 6370000000 HC RX 637 (ALT 250 FOR IP): Performed by: INTERNAL MEDICINE

## 2020-11-09 RX ADMIN — PANTOPRAZOLE SODIUM 40 MG: 40 INJECTION, POWDER, FOR SOLUTION INTRAVENOUS at 10:27

## 2020-11-09 RX ADMIN — Medication 10 ML: at 10:29

## 2020-11-09 RX ADMIN — HYDRALAZINE HYDROCHLORIDE 50 MG: 50 TABLET, FILM COATED ORAL at 06:12

## 2020-11-09 RX ADMIN — TORSEMIDE 40 MG: 20 TABLET ORAL at 10:28

## 2020-11-09 RX ADMIN — INSULIN LISPRO 10 UNITS: 100 INJECTION, SOLUTION INTRAVENOUS; SUBCUTANEOUS at 13:07

## 2020-11-09 RX ADMIN — Medication 10 ML: at 10:28

## 2020-11-09 RX ADMIN — HYDRALAZINE HYDROCHLORIDE 50 MG: 50 TABLET, FILM COATED ORAL at 13:09

## 2020-11-09 ASSESSMENT — PAIN SCALES - GENERAL: PAINLEVEL_OUTOF10: 0

## 2020-11-09 NOTE — DISCHARGE INSTR - COC
Continuity of Care Form    Patient Name: Carlos Loyola   :  1945  MRN:  [id number]    Admit date:  11/3/2020  Discharge date:  20    Code Status Order: Full Code   Advance Directives:   [facility] Documentation     Date/Time Healthcare Directive Type of Healthcare Directive Copy in [facility] [address] Agent's Name Healthcare Agent's Phone Number    20 1352  No, patient does not have an advance directive for healthcare treatment -- -- -- -- --          Admitting Physician:  Gracy Gottron, DO  PCP: Oswaldo Kumar MD    Discharging Nurse: [facility] Unit/Room#: J141/V455-09  Discharging Unit Phone Number: [phone]    Emergency Contact:   Extended Emergency Contact Information  Primary Emergency Contact: brianna townsend  Home Phone: [phone]  Mobile Phone: [phone]  Relation: Spouse  Secondary Emergency Contact: shey clark  Home Phone: [phone]  Relation: Child    Past Surgical History:  Past Surgical History:   Procedure Laterality Date    PARACENTESIS Left 10/06/2020    3330 cc by Dr. Diandra Andrade.  UPPER GASTROINTESTINAL ENDOSCOPY N/A 10/13/2020    EGD ESOPHAGOGASTRODUODENOSCOPY WITH BANDING to esophageal varices sites performed by Jaiden Bhagat MD at [address] [place] [address] N/A 2020    EGD ESOPHAGOGASTRODUODENOSCOPY performed by Jaiden Bhagat MD at [facility]       Immunization History: There is no immunization history on file for this patient.     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    Chronic kidney disease, stage III (moderate) N18.30    Weakness R53.1    Uncontrolled type 2 diabetes mellitus with hyperglycemia (HCC) E11.65    Pneumonia of right lower lobe due to infectious organism J18.9    Hydronephrosis N13.30    Upper GI bleed K92.2    HTN (hypertension) I10    Ascites R18.8    Hematemesis K92.0    Secondary esophageal varices with bleeding (HCC) I85.11    GIB (gastrointestinal bleeding) K92.2       Isolation/Infection:   Isolation          No Isolation        Patient Infection Status     Infection Onset Added Last Indicated Last Indicated By Review Planned Expiration Resolved Resolved By    None active    Resolved    COVID-19 Rule Out 10/05/20 10/05/20 10/06/20 COVID-19 (Ordered)   10/06/20 Rule-Out Test Resulted    COVID-19 Rule Out 10/05/20 10/05/20 10/05/20 COVID-19 (Ordered)   10/05/20 Rule-Out Test Resulted          Nurse Assessment:  Last Vital Signs: BP (!) 123/49   Pulse 86   Temp 98.6 °F (37 °C) (Oral)   Resp 16   Ht 5' 3\" (1.6 m)   Wt 141 lb (64 kg)   SpO2 100%   BMI 24.98 kg/m²     Last documented pain score (0-10 scale): Pain Level: 0  Last Weight:   Wt Readings from Last 1 Encounters:   11/04/20 141 lb (64 kg)     Mental Status:  oriented and alert    IV Access:  - None    Nursing Mobility/ADLs:  Walking   Assisted  Transfer  Assisted  Bathing  Assisted  Dressing  Assisted  Toileting  Assisted  Feeding  Independent  Med Admin  Assisted  Med Delivery   whole    Wound Care Documentation and Therapy:        Elimination:  Continence:   · Bowel: Yes  · Bladder: Yes  Urinary Catheter: None   Colostomy/Ileostomy/Ileal Conduit: No       Date of Last BM: 11/8/20    Intake/Output Summary (Last 24 hours) at 11/9/2020 1144  Last data filed at 11/8/2020 2317  Gross per 24 hour   Intake --   Output 850 ml   Net -850 ml     I/O last 3 completed shifts:  In: -   Out: 850 [Urine:850]    Safety Concerns:      At Risk for Falls    Impairments/Disabilities:      None    Nutrition Therapy:  Current Nutrition Therapy:   - Oral Diet:  Dental Soft    Routes of Feeding: Oral  Liquids: No Restrictions  Daily Fluid Restriction: no  Last Modified Barium Swallow with Video (Video Swallowing Test): not done    Treatments at the Time of Hospital Discharge:   Respiratory Treatments: ***  Oxygen

## 2020-11-09 NOTE — DISCHARGE SUMMARY
Hospital Medicine Discharge Summary    Manish Olson  :  1945  MRN:  [id number]    Admit date:  11/3/2020  Discharge date:  2020    Admitting Physician:  Corinna Clayton DO  Primary Care Physician:  Quentin Mojica MD    Manish Olson is a 70 y.o. female that was admitted and treated at [facility] for the following medical issues: Active Problems:    GIB (gastrointestinal bleeding)  Resolved Problems:    * No resolved hospital problems. *      Discharge Diagnoses: Active Problems:    GIB (gastrointestinal bleeding)  Resolved Problems:    * No resolved hospital problems. *    Chief Complaint   Patient presents with    GI Problem     dark stools per patient since last night. hx of liver CA. starting chemo next month. Hospital Course:   Manish Olson is a 70 y.o. female who was admitted to the hospital with variceal bleeding. IV antibiotics, octreotide, endoscopy with banding. Aspirin discontinued indefinitely. Unfortunately well-known history of cholangiocarcinoma with metastasis  Pt was discharge in a stable condition. BP (!) 121/50   Pulse 86   Temp 98.6 °F (37 °C) (Oral)   Resp 16   Ht 5' 3\" (1.6 m)   Wt 141 lb (64 kg)   SpO2 100%   BMI 24.98 kg/m²     Patient was seen by the following consultants while admitted to [facility]:   Consults:  IP CONSULT TO GI  IP CONSULT TO HOSPITALIST  IP CONSULT TO GI  IP CONSULT TO DIETITIAN  IP CONSULT TO PALLIATIVE CARE  IP CONSULT TO HOME CARE NEEDS    Significant Diagnostic Studies:    Refer to chart if  Xr Chest Portable    Result Date: 2020  Exam: XR CHEST PORTABLE History:  intubation Technique: AP portable view of the chest obtained. Comparison: Chest x-ray from 2020 Findings: The patient is intubated with the tip of the endotracheal tube and the right mainstem bronchus. .  There is no pneumothorax. The cardiomediastinal silhouette is within normal limits.  There are mild increased pulmonary markings in the right lung base. There is opacity in the left inferior hemithorax representing effusion with underlying atelectasis or infiltrate. Bones of the thorax appear intact. Status post intubation with the tip of the endotracheal tube in the right mainstem bronchus. Recommend retracting 2 to 3 cm. There is a left pleural effusion with underlying atelectasis versus infiltrate and mild atelectasis versus infiltrate in the right lung base. Discharge Medications:       Corinne Ramirez   Home Medication Instructions RR    Printed on:20 5543   Medication Information                      albuterol sulfate HFA (PROVENTIL HFA) 108 (90 Base) MCG/ACT inhaler  Inhale 2 puffs into the lungs every 6 hours as needed for Wheezing             amLODIPine (NORVASC) 10 MG tablet  Take 10 mg by mouth daily             aspirin 81 MG EC tablet  Take 81 mg by mouth daily             hydrALAZINE (APRESOLINE) 50 MG tablet  Take 1 tablet by mouth every 8 hours             insulin glargine (LANTUS) 100 UNIT/ML injection vial  Inject 20 Units into the skin daily (before lunch)             insulin lispro (HUMALOG) 100 UNIT/ML injection vial  Inject 10 Units into the skin 3 times daily (before meals)             lactulose (CHRONULAC) 10 GM/15ML solution  Take 30 mLs by mouth 2 times daily             torsemide (DEMADEX) 20 MG tablet  Take 2 tablets by mouth daily                 Disposition:   If discharged to Home, Any [place] [address] needs that were indicated and/or required as been addressed and set up by Social Work. Condition at discharge: Pt was medically stable at the time of discharge. Activity: activity as tolerated    Total time taken for discharging this patient: 40 minutes. Greater than 70% of time was spent focused exclusively on this patient.  Time was taken to review chart, discuss plans with consultants, reconciling medications, discussing plan answering questions with patient.      Signed:  Guero Mccullough

## 2020-11-09 NOTE — PROGRESS NOTES
Physical Therapy Med Surg Initial Assessment  [facility]  [facility]       NAME: Piotr Perez  : 1945 (70 y.o.)  MRN: [id number]  CODE STATUS: Full Code    Date of Service: 2020    Patient Diagnosis(es): GIB (gastrointestinal bleeding) [K92.2]   Chief Complaint   Patient presents with    GI Problem     dark stools per patient since last night. hx of liver CA. starting chemo next month. Patient Active Problem List    Diagnosis Date Noted    GIB (gastrointestinal bleeding) 2020    Upper GI bleed 10/13/2020    HTN (hypertension) 10/13/2020    Ascites 10/13/2020    Hematemesis     Secondary esophageal varices with bleeding (HCC)     Pneumonia of right lower lobe due to infectious organism     Hydronephrosis     Uncontrolled type 2 diabetes mellitus with hyperglycemia ([address]     Weakness 10/05/2020    Chronic kidney disease, stage III (moderate) 2019    Erythropoietin deficiency anemia 2019        Past Medical History:   Diagnosis Date    Ascites     Cancer ([address]     liver    CKD (chronic kidney disease)     Depression     Diabetes mellitus ([address]     History of blood transfusion     Hydronephrosis     Hypertension     Pneumonia     Upper GI bleed      Past Surgical History:   Procedure Laterality Date    PARACENTESIS Left 10/06/2020    3330 cc by Dr. Berlin Tompkins.     UPPER GASTROINTESTINAL ENDOSCOPY N/A 10/13/2020    EGD ESOPHAGOGASTRODUODENOSCOPY WITH BANDING to esophageal varices sites performed by Celine Carver MD at [address] [place] [address] N/A 2020    EGD ESOPHAGOGASTRODUODENOSCOPY performed by Celine [facility] MD at [facility]       Chart Reviewed: Yes  Family / Caregiver Present: Yes  General Comment  Comments: Pt resting in bed - agreeable to PT evaluation    Restrictions:  Restrictions/Precautions: Fall Risk     SUBJECTIVE: Subjective: \"I can get up to that chair.\"    Pain  Pre Treatment Pain Screening  Comments / Details: denies    Post Treatment Pain Screening:   Pain Assessment  Pain Assessment: (denies)    Prior Level of Function:  Social/Functional History  Lives With: Spouse(and daughter home all the time)  Type of Home: House  Home Layout: One level  Home Access: Level entry  Bathroom Shower/Tub: Tub/Shower unit  Bathroom Equipment: (planning to have grab bars installed)  Home Equipment: Rolling walker  Receives Help From: Family(daughter)  ADL Assistance: (daughter assists. Pt sponge bathing until she can get a shower bench)  Homemaking Assistance: (daughter and spouse complete)  Ambulation Assistance: (daughter walks with her; pt uses [employer])  Transfer Assistance: (daughter assists)  Active : No  Patient's  Info: family  Additional Comments: fell once a month ago    OBJECTIVE:   Vision: Within Functional Limits  Hearing: Within functional limits    Cognition:  Overall Orientation Status: Within Functional Limits  Follows Commands: Within Functional Limits    Observation/Palpation  Observation: No acute distress noted. Pt pleasant and motivated. Mildly impulsive. ROM:  RLE PROM: WFL  LLE PROM: WFL    Strength:  Strength RLE  Strength RLE: WFL  Strength LLE  Strength LLE: WFL    Neuro:  Balance  Sitting - Static: Good  Sitting - Dynamic: Good  Standing - Static: Fair  Standing - Dynamic: Fair     Motor Control  Gross Motor? : (impulsive however no LOB)  Sensation  Overall Sensation Status: WFL    Bed mobility  Rolling to Right: Contact guard assistance  Supine to Sit: Contact guard assistance  Comment: Pt with heavy use of bedrail to sit up. Transfers  Sit to Stand: Contact guard assistance  Stand to sit: Stand by assistance  Bed to Chair: Stand by assistance    Ambulation  Ambulation?: Yes  Ambulation 1  Device: Rolling Walker  Assistance: Contact guard assistance  Quality of Gait: shuffling steps.  Cues for managing [employer] around obstacles. Distance: 10ft x 2    Stairs/Curb  Stairs?: No         Activity Tolerance  Activity Tolerance: Patient Tolerated treatment well          PT Education  PT Education: Goals;PT Role;Plan of Care    ASSESSMENT:   Body structures, Functions, Activity limitations: Decreased functional mobility ; Decreased safe awareness;Decreased balance;Decreased coordination;Decreased strength;Decreased ROM; Decreased endurance;Decreased ADL status  Decision Making: Medium Complexity  History: High  Exam: Med  Clinical Presentation: Med    Prognosis: Good  Barriers to Learning: none at this time    DISCHARGE RECOMMENDATIONS:  Discharge Recommendations: Continue to assess pending progress, Patient would benefit from continued therapy after discharge    Assessment: Continued PT indicated to progress mobility and facilitate DC at highest level of indep and safety. Rec 24/7 assist with mobility. REQUIRES PT FOLLOW UP: Yes      PLAN OF CARE:  Plan  Times per week: 3-6  Current Treatment Recommendations: Strengthening, Functional Mobility Training, Balance Training, Stair training, Gait Training, Cognitive/Perceptual Training, Transfer Training, Endurance Training, ADL/Self-care Training, Neuromuscular Re-education, Home Exercise Program, Safety Education & Training, Equipment Evaluation, Education, & procurement, Patient/Caregiver Education & Training  Safety Devices  Type of devices:  All fall risk precautions in place    Goals:  Short term goals  Short term goal 1: Pt to complete HEP with verbal cues  Long term goals  Long term goal 1: Pt to complete bed mobility with supervision  Long term goal 2: Pt to complete transfers with supervision/SBA  Long term goal 3: Pt to ambulate 50ft with LRD and supervision/SBA    AMPAC (6 CLICK) BASIC MOBILITY  AM-PAC Inpatient Mobility Raw Score : 18     Therapy Time:   Individual   Time In 0955   Time Out 1013   Minutes [facility], [address] [place] [address], 11/09/20 at 12:16 PM         Definitions for

## 2020-11-09 NOTE — CARE COORDINATION
Phone call to daughter Abelino Marc. She confirmed that pt is from home with her , but also that she is there most of the day as well. Other family members also assist.  Pt has a walker, cane and wheelchair. They are declining DC to SNF at this time and plan for pt to return to home with their care. They would like [address]. Reviewed IMM.
[facility] Marizol Wayne. Notified of patient d/c today. Verified address and phone number to call of [address].
Social work note: Met with patient's spouse Alex who deferred LSW to daughter Juan Antonio Myles. Spoke with Juan Antonio Narvaezannelieseshola who indicates patient was open with [facility] and provided contact name of Nicol Polanco @ [phone]. Dtr described that her mother went to [facility] and was isolated in her room receiving therapy only in there. Dtr indicates they talisha they could provided more at home as she could walk around the home vs a small room. Currently, dtr would like patient to go home and resume her [facility] with [facility]. [address] [facility] and confirmed patient had PT/OT, nurse and aide.  Electronically signed by BRIAN Christopher on 11/5/2020 at 11:46 AM
Spoke with daughter Asad Tavera. She states they are unsure of what they will do with mother at this time. Paged PT/OT to discuss evaluations are needed to determine further plan. Asad Tavera states that Jaye's spouse is forgetful and needs assistance hisself and cannot make reliable decisions. She states her and her sister are decision makers. She is open to options as indicated by physician. She to visit tomorrow and get updates.
basic dialogue that supports the patient's individualized plan of care/goals and shares the quality data associated with the providers. Yes    Does Patient Have a High-Risk for Readmission Diagnosis (CHF, PN, MI, COPD)? Yes - Pneumonia; consults to be determined as medical work-up continues. Patient will have GI consult to start. The plan for Transition of Care is related to the following treatment goals: Management of GI bleeding and low H/H, further evaluation to decide POC. Initial Discharge Plan? (Note: please see concurrent daily documentation for any updates after initial note). Patient/family state they would prefer she go home with [facility]. The Patient and/or patient representative: Patient/daughter (HC-POA) were provided with notice of choice of any post-acute providers for care and equipment and agrees with discharge plan.   Yes    Electronically signed by Rashmi Evans RN on 11/3/2020 at 11:12 AM

## 2020-11-09 NOTE — PROGRESS NOTES
Dr. Hui Veloz said OK for discharge. Went over d/c paperwork with pt. She understands follow up and I made it very clear she is to stop taking the aspirin. Daughter here to take her home with [facility].

## 2020-11-09 NOTE — PROGRESS NOTES
Patient states she feels much better today. Her appetite was >50% for all meals. She sat in chair and fed herself dinner. Up x1 assist. No BM throughout shift and continent of urine with bedpan. Denies any pain or nausea throughout shift.      Electronically signed by Khris Pisano RN on 11/8/2020 at 8:12 PM

## 2020-11-09 NOTE — PROGRESS NOTES
[facility] OCCUPATIONAL THERAPY EVALUATION - ACUTE     NAME: Hugo Parikh  : 1945 (70 y.o.)  MRN: [id number]  CODE STATUS: Full Code  [facility]: [id number]    Date of Service: 2020    Patient Diagnosis(es): GIB (gastrointestinal bleeding) [K92.2]   Chief Complaint   Patient presents with    GI Problem     dark stools per patient since last night. hx of liver CA. starting chemo next month. Patient Active Problem List    Diagnosis Date Noted    GIB (gastrointestinal bleeding) 2020    Upper GI bleed 10/13/2020    HTN (hypertension) 10/13/2020    Ascites 10/13/2020    Hematemesis     Secondary esophageal varices with bleeding (HCC)     Pneumonia of right lower lobe due to infectious organism     Hydronephrosis     Uncontrolled type 2 diabetes mellitus with hyperglycemia ([facility] [address]     Weakness 10/05/2020    Chronic kidney disease, stage III (moderate) 2019    Erythropoietin deficiency anemia 2019        Past Medical History:   Diagnosis Date    Ascites     Cancer ([facility] [address]     liver    CKD (chronic kidney disease)     Depression     Diabetes mellitus ([facility] [address] [facility]     History of blood transfusion     Hydronephrosis     Hypertension     Pneumonia     Upper GI bleed      Past Surgical History:   Procedure Laterality Date    PARACENTESIS Left 10/06/2020    3330 cc by Dr. Brandon Adams.  UPPER GASTROINTESTINAL ENDOSCOPY N/A 10/13/2020    EGD ESOPHAGOGASTRODUODENOSCOPY WITH BANDING to esophageal varices sites performed by Tanvi Bradshaw MD at [address] [place] [address] 2020    EGD ESOPHAGOGASTRODUODENOSCOPY performed by Tanvi Bradshaw MD at [facility]        Restrictions  Restrictions/Precautions: Fall Risk     Safety Devices: [address] [place] [address] in place: Yes  Type of devices:  All fall risk precautions in place        Subjective  Pre Treatment Pain Screening  Pain at present: 0  Scale Used: Numeric Score  Intervention List: Patient able to continue with treatment, Patient declined any intervention    Pain Reassessment:   Pain Assessment  Patient Currently in Pain: Denies  Pain Assessment: 0-10  Pain Level: 0       Prior Level of Function:  Social/Functional History  Lives With: Spouse(and daughter home all the time)  Type of Home: House  Home Layout: One level  Home Access: Level entry  Bathroom Shower/Tub: Tub/Shower unit  Bathroom Equipment: (planning to have grab bars installed)  Home Equipment: Rolling walker  Receives Help From: Family(daughter)  ADL Assistance: (daughter assists. Pt sponge bathing until she can get a shower bench)  Homemaking Assistance: (daughter and spouse complete)  Ambulation Assistance: (daughter walks with her; pt uses [address])  Transfer Assistance: (daughter assists)  Active : No  Patient's  Info: family  Additional Comments: fell once a month ago    OBJECTIVE:     Orientation Status:  Orientation  Overall Orientation Status: Within Functional Limits    Observation:  Observation/Palpation  Observation: No acute distress noted. Pt pleasant and motivated. Mildly impulsive. Cognition Status:  Cognition  Overall Cognitive Status: Exceptions  Arousal/Alertness: Appropriate responses to stimuli  Following Commands:  Follows one step commands with repetition  Attention Span: Attends with cues to redirect, Difficulty attending to directions, Difficulty dividing attention  Memory: Decreased recall of precautions  Safety Judgement: Decreased awareness of need for assistance, Decreased awareness of need for safety  Problem Solving: Assistance required to identify errors made, Assistance required to generate solutions, Assistance required to implement solutions, Assistance required to correct errors made  Insights: Decreased awareness of deficits  Initiation: Requires cues for some  Sequencing: Requires cues for some  Cognition Comment: Verbal cues throughout for safety and sequencing    Perception Status:  Perception  Overall Perceptual Status: WFL    Sensation Status:  Sensation  Overall Sensation Status: WFL    Vision and Hearing Status:  Vision  Vision: Within Functional Limits  Hearing  Hearing: Within functional limits     ROM:   LUE AROM (degrees)  LUE AROM : WFL  Left Hand AROM (degrees)  Left Hand AROM: WFL  RUE AROM (degrees)  RUE AROM : WFL  Right Hand AROM (degrees)  Right Hand AROM: WFL    Strength:  LUE Strength  Gross LUE Strength: Exceptions to [facility]  L Hand General: 3+/5  LUE Strength Comment: 3+/5 all planes  RUE Strength  Gross RUE Strength: Exceptions to [facility] [place]  R Hand General: 3+/5  RUE Strength Comment: 3+/5 all planes    Coordination, Tone, Quality of Movement: Tone RUE  RUE Tone: Normotonic  Tone LUE  LUE Tone: Normotonic  Coordination  Movements Are Fluid And Coordinated: No  Coordination and Movement description: Fine motor impairments, Decreased speed, Decreased accuracy, Right UE, Left UE    Hand Dominance:  Hand Dominance  Hand Dominance: Right    ADL Status:  ADL  Feeding: Setup  Grooming: Supervision  UE Bathing: Stand by assistance  LE Bathing: Minimal assistance  UE Dressing: Stand by assistance  LE Dressing: Minimal assistance  Toileting: Minimal assistance  Additional Comments: Simulated ADLs as above.  Decreased safety awareness, increased time for sequencing, verbal cues for safety  Toilet Transfers  Toilet - Technique: Ambulating  Equipment Used: Grab bars  Toilet Transfer: Contact guard assistance  Toilet Transfers Comments: Verbal cues for sequencing and safety       Therapy key for assistance levels -   Independent = Pt. is able to perform task with no assistance but may require a device   Stand by assistance = Pt. does not perform task at an independent level but does not need physical assistance, requires verbal cues  Minimal, Moderate, Maximal Assistance = Pt. requires physical assistance (25%, 50%, 75% assist from helper) for task but is able to actively participate in task   Dependent = Pt. requires total assistance with task and is not able to actively participate with task completion     Functional Mobility:  Functional Mobility  Functional - Mobility Device: Rolling Walker  Activity: To/from bathroom  Assist Level: Contact guard assistance  Functional Mobility Comments: Verbal cues for safety, decreased sequencing  Transfers  Sit to stand: Contact guard assistance  Stand to sit: Contact guard assistance  Transfer Comments: Verbal and tactile cues for safety    Bed Mobility  Bed mobility  Rolling to Right: Contact guard assistance  Supine to Sit: Contact guard assistance  Comment: Pt. heavily utilized bed rail for sitting upright    Seated and Standing Balance:  Balance  Sitting Balance: Stand by assistance  Standing Balance: Contact guard assistance    Functional Endurance:  Activity Tolerance  Activity Tolerance: Patient Tolerated treatment well    D/C Recommendations:  OT D/C RECOMMENDATIONS  REQUIRES OT FOLLOW UP: Yes    Equipment Recommendations:  OT Equipment Recommendations  Other: Continue to assess    OT Education:   OT Education  OT Education: OT Role, Plan of Care  Patient Education: Educated pt. on role of acute care OT  Barriers to Learning: None    OT Follow Up:  OT D/C RECOMMENDATIONS  REQUIRES OT FOLLOW UP: Yes       Assessment/Discharge Disposition:  Assessment: Pt. is a 70year old woman from home with spouse who presents to [facility] with the above deficits which impact her ability to perform ADLs and IADLs. Pt would benefit from continued OT to maximize independence and safety with ADl tasks.   Performance deficits / Impairments: Decreased functional mobility , Decreased ADL status, Decreased strength, Decreased safe awareness, Decreased cognition, Decreased endurance, Decreased balance, Decreased high-level IADLs, Decreased fine motor control, Decreased coordination  Prognosis: Good  Discharge Recommendations: Continue to assess pending progress  Decision 12:59 PM

## 2020-11-09 NOTE — PROGRESS NOTES
Pt's vitals are stable, she has no complaints. She has been up to the chair all afternoon, spouse is at bedside. Hgb is stable at 9.5, no signs of active bleeding. Perfectserve sent to Dr. Sim Campos asking if he's OK with patient being discharged. No needs at [facility] time.

## 2020-11-19 ENCOUNTER — HOSPITAL ENCOUNTER (OUTPATIENT)
Dept: INFUSION THERAPY | Age: 75
Setting detail: INFUSION SERIES
Discharge: HOME | End: 2020-11-19
Payer: MEDICARE

## 2020-11-19 VITALS
RESPIRATION RATE: 18 BRPM | DIASTOLIC BLOOD PRESSURE: 62 MMHG | TEMPERATURE: 98.3 F | HEART RATE: 85 BPM | SYSTOLIC BLOOD PRESSURE: 145 MMHG

## 2020-11-19 DIAGNOSIS — D63.1 ERYTHROPOIETIN DEFICIENCY ANEMIA: ICD-10-CM

## 2020-11-19 DIAGNOSIS — N18.30 STAGE 3 CHRONIC KIDNEY DISEASE, UNSPECIFIED WHETHER STAGE 3A OR 3B CKD (HCC): Primary | ICD-10-CM

## 2020-11-19 PROCEDURE — 96372 THER/PROPH/DIAG INJ SC/IM: CPT

## 2020-11-19 PROCEDURE — 6360000002 HC RX W HCPCS: Performed by: INTERNAL MEDICINE

## 2020-11-19 RX ADMIN — EPOETIN ALFA-EPBX 10000 UNITS: 10000 INJECTION, SOLUTION INTRAVENOUS; SUBCUTANEOUS at 08:57

## 2020-11-19 NOTE — PROGRESS NOTES
Pt states daughter talked to Dr. Rosenda Salvador yesterday about pt. Injection admin. Pt tolerated well. Pt to [facility]  Twice while here. States loose stools. All equipment used in the care for this patient has been cleaned.

## 2020-12-04 ENCOUNTER — HOSPITAL ENCOUNTER (OUTPATIENT)
Dept: INFUSION THERAPY | Age: 75
Setting detail: INFUSION SERIES
Discharge: HOME | End: 2020-12-04
Payer: MEDICARE

## 2020-12-04 VITALS
DIASTOLIC BLOOD PRESSURE: 64 MMHG | RESPIRATION RATE: 18 BRPM | HEART RATE: 67 BPM | TEMPERATURE: 97.8 F | SYSTOLIC BLOOD PRESSURE: 142 MMHG

## 2020-12-04 DIAGNOSIS — D63.1 ERYTHROPOIETIN DEFICIENCY ANEMIA: ICD-10-CM

## 2020-12-04 DIAGNOSIS — N18.30 STAGE 3 CHRONIC KIDNEY DISEASE, UNSPECIFIED WHETHER STAGE 3A OR 3B CKD (HCC): Primary | ICD-10-CM

## 2020-12-04 LAB
BASOPHILS ABSOLUTE: 0 K/UL (ref 0–0.2)
BASOPHILS RELATIVE PERCENT: 0.5 %
EOSINOPHILS ABSOLUTE: 0 K/UL (ref 0–0.7)
EOSINOPHILS RELATIVE PERCENT: 0.4 %
HCT VFR BLD CALC: 26.8 % (ref 37–47)
HEMOGLOBIN: 8.7 G/DL (ref 12–16)
LYMPHOCYTES ABSOLUTE: 1.4 K/UL (ref 1–4.8)
LYMPHOCYTES RELATIVE PERCENT: 16.8 %
MCH RBC QN AUTO: 28.1 PG (ref 27–31.3)
MCHC RBC AUTO-ENTMCNC: 32.6 % (ref 33–37)
MCV RBC AUTO: 86 FL (ref 82–100)
MONOCYTES ABSOLUTE: 0.7 K/UL (ref 0.2–0.8)
MONOCYTES RELATIVE PERCENT: 8.4 %
NEUTROPHILS ABSOLUTE: 6.3 K/UL (ref 1.4–6.5)
NEUTROPHILS RELATIVE PERCENT: 73.9 %
PDW BLD-RTO: 18.1 % (ref 11.5–14.5)
PLATELET # BLD: 189 K/UL (ref 130–400)
RBC # BLD: 3.11 M/UL (ref 4.2–5.4)
WBC # BLD: 8.6 K/UL (ref 4.8–10.8)

## 2020-12-04 PROCEDURE — 6360000002 HC RX W HCPCS: Performed by: INTERNAL MEDICINE

## 2020-12-04 PROCEDURE — 85025 COMPLETE CBC W/AUTO DIFF WBC: CPT

## 2020-12-04 PROCEDURE — 96372 THER/PROPH/DIAG INJ SC/IM: CPT

## 2020-12-04 PROCEDURE — 36415 COLL VENOUS BLD VENIPUNCTURE: CPT

## 2020-12-04 RX ADMIN — EPOETIN ALFA-EPBX 10000 UNITS: 10000 INJECTION, SOLUTION INTRAVENOUS; SUBCUTANEOUS at 14:39

## 2020-12-04 NOTE — PROGRESS NOTES
Hgb 8.7, retacrit given. Pt tolerated. States no c/o except fatigue.   Was in [facility].

## 2020-12-04 NOTE — PROGRESS NOTES
Pt to [facility] via w/c with sppouse, for lab test and possible retacrit. States no c/o P/N/SOB. Lab drawn and sent.

## 2020-12-04 NOTE — PROGRESS NOTES
Pt has not shown for appt. Attempted to call, but voicemail states this person is not accepting calls at this time.

## 2020-12-17 ENCOUNTER — HOSPITAL ENCOUNTER (OUTPATIENT)
Dept: INFUSION THERAPY | Age: 75
Setting detail: INFUSION SERIES
End: 2020-12-17
Payer: MEDICARE

## 2020-12-21 ENCOUNTER — HOSPITAL ENCOUNTER (OUTPATIENT)
Dept: INFUSION THERAPY | Age: 75
Setting detail: INFUSION SERIES
Discharge: HOME | End: 2020-12-21
Payer: MEDICARE

## 2020-12-21 VITALS
DIASTOLIC BLOOD PRESSURE: 71 MMHG | RESPIRATION RATE: 16 BRPM | HEART RATE: 88 BPM | TEMPERATURE: 98.6 F | SYSTOLIC BLOOD PRESSURE: 157 MMHG

## 2020-12-21 DIAGNOSIS — D63.1 ERYTHROPOIETIN DEFICIENCY ANEMIA: ICD-10-CM

## 2020-12-21 DIAGNOSIS — N18.30 STAGE 3 CHRONIC KIDNEY DISEASE, UNSPECIFIED WHETHER STAGE 3A OR 3B CKD (HCC): Primary | ICD-10-CM

## 2020-12-21 PROCEDURE — 96372 THER/PROPH/DIAG INJ SC/IM: CPT

## 2020-12-21 PROCEDURE — 6360000002 HC RX W HCPCS: Performed by: INTERNAL MEDICINE

## 2020-12-21 RX ADMIN — EPOETIN ALFA-EPBX 10000 UNITS: 10000 INJECTION, SOLUTION INTRAVENOUS; SUBCUTANEOUS at 13:10

## 2020-12-21 NOTE — FLOWSHEET NOTE
Patient to the floor via wheechair for her injection. Vital signs taken. Call light within reach.  No distress is noted

## 2020-12-21 NOTE — FLOWSHEET NOTE
Injection given in right arm. Tolerated well. Left the unit via wheelchair. All equipment used in the care for this patient has been cleaned.

## 2023-09-07 VITALS
SYSTOLIC BLOOD PRESSURE: 195 MMHG | WEIGHT: 138.89 LBS | TEMPERATURE: 98.4 F | DIASTOLIC BLOOD PRESSURE: 85 MMHG | OXYGEN SATURATION: 98 % | WEIGHT: 128.09 LBS | BODY MASS INDEX: 22.7 KG/M2 | RESPIRATION RATE: 20 BRPM | HEIGHT: 63 IN | HEART RATE: 91 BPM | HEIGHT: 63 IN | BODY MASS INDEX: 24.61 KG/M2

## 2023-09-08 VITALS
WEIGHT: 131.84 LBS | TEMPERATURE: 97.7 F | SYSTOLIC BLOOD PRESSURE: 187 MMHG | HEART RATE: 75 BPM | OXYGEN SATURATION: 99 % | RESPIRATION RATE: 16 BRPM | BODY MASS INDEX: 24.26 KG/M2 | DIASTOLIC BLOOD PRESSURE: 79 MMHG | HEIGHT: 62 IN

## 2024-06-06 NOTE — PROGRESS NOTES
Pt left unit with spouse. All equipment used in the care for this patient has been cleaned. We would need the dates he missed.

## (undated) DEVICE — BRUSH ENDO CLN L90.5IN SHTH DIA1.7MM BRIST DIA5-7MM 2-6MM

## (undated) DEVICE — CONMED SCOPE SAVER BITE BLOCK, 20X27 MM: Brand: SCOPE SAVER

## (undated) DEVICE — ENDO CARRY-ON PROCEDURE KIT: Brand: ENDO CARRY-ON PROCEDURE KIT

## (undated) DEVICE — ADAPTER FLSH PMP FLD MGMT GI IRRIG OFP 2 DISPOSABLE

## (undated) DEVICE — Device: Brand: ENDO SMARTCAP

## (undated) DEVICE — TUBE SET 96 MM 64 MM H2O PERISTALTIC STD AUX CHANNEL

## (undated) DEVICE — SINGLE PORT MANIFOLD: Brand: NEPTUNE 2

## (undated) DEVICE — LIGATOR ENDOSCP DIA8.6-11.5MM MULT DISP SPDBND LIGATOR SUP

## (undated) DEVICE — TUBING, SUCTION, 1/4" X 10', STRAIGHT: Brand: MEDLINE

## (undated) DEVICE — MULTIPLE BAND LIGATOR: Brand: SPEEDBAND SUPERVIEW SUPER 7

## (undated) DEVICE — GLOVE ORANGE PI 8 1/2   MSG9085